# Patient Record
Sex: MALE | Race: BLACK OR AFRICAN AMERICAN | NOT HISPANIC OR LATINO | Employment: OTHER | URBAN - METROPOLITAN AREA
[De-identification: names, ages, dates, MRNs, and addresses within clinical notes are randomized per-mention and may not be internally consistent; named-entity substitution may affect disease eponyms.]

---

## 2017-08-09 ENCOUNTER — HOSPITAL ENCOUNTER (EMERGENCY)
Facility: OTHER | Age: 57
Discharge: HOME | End: 2017-08-09
Attending: EMERGENCY MEDICINE
Payer: MEDICARE

## 2017-08-09 VITALS
HEIGHT: 69 IN | SYSTOLIC BLOOD PRESSURE: 178 MMHG | OXYGEN SATURATION: 98 % | RESPIRATION RATE: 18 BRPM | BODY MASS INDEX: 26.66 KG/M2 | DIASTOLIC BLOOD PRESSURE: 117 MMHG | TEMPERATURE: 99 F | WEIGHT: 180 LBS | HEART RATE: 62 BPM

## 2017-08-09 DIAGNOSIS — G89.29 CHRONIC RIGHT-SIDED LOW BACK PAIN WITHOUT SCIATICA: Primary | ICD-10-CM

## 2017-08-09 DIAGNOSIS — M54.50 CHRONIC RIGHT-SIDED LOW BACK PAIN WITHOUT SCIATICA: Primary | ICD-10-CM

## 2017-08-09 PROCEDURE — 96372 THER/PROPH/DIAG INJ SC/IM: CPT

## 2017-08-09 PROCEDURE — 63600175 PHARM REV CODE 636 W HCPCS: Performed by: PHYSICIAN ASSISTANT

## 2017-08-09 PROCEDURE — 99283 EMERGENCY DEPT VISIT LOW MDM: CPT | Mod: 25

## 2017-08-09 RX ORDER — HYDROCODONE BITARTRATE AND ACETAMINOPHEN 10; 325 MG/1; MG/1
TABLET ORAL
Status: ON HOLD | COMMUNITY
End: 2018-08-13

## 2017-08-09 RX ORDER — KETOROLAC TROMETHAMINE 30 MG/ML
15 INJECTION, SOLUTION INTRAMUSCULAR; INTRAVENOUS
Status: COMPLETED | OUTPATIENT
Start: 2017-08-09 | End: 2017-08-09

## 2017-08-09 RX ADMIN — KETOROLAC TROMETHAMINE 15 MG: 30 INJECTION, SOLUTION INTRAMUSCULAR at 06:08

## 2017-08-09 NOTE — ED PROVIDER NOTES
"Encounter Date: 8/9/2017       History     Chief Complaint   Patient presents with    Back Pain     Pt reports low back pain x 12 years. Pt is out of Percocet. "I'm having Percocet withdrawals. I'll do anything to get out the pain." Ambulatory. Steady gait.      Patient is 56 year old male who presents with complaints of chronic lower back pain with exacerbation of pain because of poor pain management regimen.  He admits that his pain is usually managed at pain management clinic but he admits that this pain management clinic has recently closed down.  They gave him 3 options to follow-up with which he admits that he has.  He has appointments in the coming months and admits that he was seen today but was told by this particular clinic that they're also closing.  He is concerned that he has no follow-up plan and is amenable to "getting off the narcotics "but does admit that he needs someone to help facilitate this for him.  He has no report of nausea, vomiting, diarrhea, chest pain or shortness of breath.  He denies anxiety or agitation.  He does admit to taking half for Percocet today and admits that over the last month he has been stretching out remaining pills from his last prescription.  He denies fever, chills, nausea, vomiting.  He is currently unaccompanied in the ER.          Review of patient's allergies indicates:  No Known Allergies  Past Medical History:   Diagnosis Date    Chronic back pain     Diabetes mellitus     Gallstones     Glaucoma (increased eye pressure)     Hepatitis C     Hypertension      Past Surgical History:   Procedure Laterality Date    CHOLECYSTECTOMY      COLONOSCOPY N/A 4/27/2016    Procedure: COLONOSCOPY;  Surgeon: Andre Sanchez MD;  Location: [facility];  Service: Endoscopy;  Laterality: N/A;     History reviewed. No pertinent family history.  Social History   Substance Use Topics    Smoking status: Current Every Day Smoker     Packs/day: 1.00     Types: Cigarettes    " Smokeless tobacco: Never Used    Alcohol use No     Review of Systems   Constitutional: Negative for fever.   HENT: Negative for sore throat.    Respiratory: Negative for shortness of breath.    Cardiovascular: Negative for chest pain.   Gastrointestinal: Negative for abdominal pain and nausea.   Genitourinary: Negative for dysuria.   Musculoskeletal: Positive for back pain.   Skin: Negative for rash.   Neurological: Negative for weakness.   Hematological: Does not bruise/bleed easily.       Physical Exam     Initial Vitals [08/09/17 1655]   BP Pulse Resp Temp SpO2   (!) 172/114 71 16 98.3 °F (36.8 °C) 97 %      MAP       133.33         Physical Exam    Nursing note and vitals reviewed.  Constitutional: He appears well-developed and well-nourished. He is not diaphoretic. No distress.   Healthy appearing -American male in no acute distress or apparent pain.  He makes good eye contact, speaks in clear full sentences and ambulates with mildly antalgic gait.  He sits in upright position and transitions to standing without difficulty.  He is pleasant and cooperative.   HENT:   Head: Normocephalic and atraumatic.   Eyes: Conjunctivae and EOM are normal. Pupils are equal, round, and reactive to light. Right eye exhibits no discharge. Left eye exhibits no discharge. No scleral icterus.   Neck: Normal range of motion.   Cardiovascular: Normal rate, regular rhythm and normal heart sounds. Exam reveals no gallop and no friction rub.    No murmur heard.  Pulmonary/Chest: Breath sounds normal. He has no wheezes. He has no rhonchi. He has no rales.   Abdominal: Soft. Bowel sounds are normal.   Musculoskeletal: Normal range of motion. He exhibits no edema or tenderness.   ight-sided lumbar paraspinal musculature tenderness to palpation with no overlying skin changes.  Pain does not radiate down right leg.  There is no midline bony tenderness crepitus or step-offs.   Lymphadenopathy:     He has no cervical adenopathy.    Neurological: He is alert and oriented to person, place, and time. He has normal strength. No cranial nerve deficit or sensory deficit.   Skin: Skin is warm. Capillary refill takes less than 2 seconds. No rash and no abscess noted. No erythema.   Psychiatric: He has a normal mood and affect. His behavior is normal. Thought content normal.         ED Course   Procedures  Labs Reviewed - No data to display          Medical Decision Making:   ED Management:  Urgent evaluation a 56-year-old male who presents with complaints of exacerbation of chronic lower back pain and surgery of appropriate follow-up plan for pain management.  He is afebrile, nontoxic appearing, hemodynamically stable though hypertensive at 178/117 thought to be related to pain.  He has been compliant with his blood pressure medications.  He has no concern for vertebral fracture, cord compression or cauda equina syndrome.  No suspicion for epidural abscess.  No imaging felt warranted at this time.  I do not feel appropriate continuing patient's course of narcotic analgesia.  I did review the [facility] which does reveal a regular prescription of Percocet with occasional alternations with Norco.  He does have the same prescriber.  I do not suspect that patient is abusing narcotics however I do feel that he is dependent.  There is no evidence of acute withdrawal today and I do feel the patient is safe for discharge with instruction to follow-up with [facility] pain management clinic.  He is given a shot of Toradol which does seem to take the edge off his pain.  I did review his kidney function prior to giving this medication which did not reveal any chronic kidney injury.  Patient is educated on follow-up plan and verbalizes understanding.  He is amenable to this plan.  Case discussed with attending who agrees with plan.  Other:   I have discussed this case with another health care provider.       <> Summary of the Discussion: Hugo                     ED Course     Clinical Impression:   The encounter diagnosis was Chronic right-sided low back pain without sciatica.                           Deandra Pittman PA-C  08/09/17 1941

## 2017-08-09 NOTE — ED NOTES
Two patient identifiers have been checked and are correct.      Appearance: Pt awake, alert & oriented to person, place & time. Pt in no acute distress at present time. Pt is clean and well groomed with clothes appropriately fastened.   Skin: Skin warm, dry & intact. Color consistent with ethnicity. Mucous membranes moist. No breakdown or brusing noted.   Musculoskeletal: Patient moving all extremities well, no obvious swelling or deformities noted.  Bilateral lower back pain.  Respiratory: Respirations spontaneous, even, and non-labored. Visible chest rise noted. Airway is open and patent. No accessory muscle use noted.   Neurologic: Sensation is intact. Speech is clear and appropriate. Eyes open spontaneously, behavior appropriate to situation, follows commands, facial expression symmetrical, bilateral hand grasp equal and even, purposeful motor response noted.  Cardiac: All peripheral pulses present. No Bilateral lower extremity edema. Cap refill is <3 seconds. Pt denies chest pain, SOB, increased WOB upon exertion, dizziness or blurred vision at this time.   Abdomen: Abdomen soft, non-tender to palpation. Pt denies abdominal pains, discomfort, N/V/D at this time.   : Pt reports no dysuria or hematuria.     Fall risk band applied to pt.

## 2017-08-11 ENCOUNTER — HOSPITAL ENCOUNTER (EMERGENCY)
Facility: OTHER | Age: 57
Discharge: HOME | End: 2017-08-11
Attending: EMERGENCY MEDICINE
Payer: MEDICARE

## 2017-08-11 ENCOUNTER — TELEPHONE (OUTPATIENT)
Dept: PAIN MEDICINE | Facility: CLINIC | Age: 57
End: 2017-08-11

## 2017-08-11 VITALS
HEIGHT: 72 IN | SYSTOLIC BLOOD PRESSURE: 149 MMHG | HEART RATE: 78 BPM | TEMPERATURE: 99 F | RESPIRATION RATE: 18 BRPM | DIASTOLIC BLOOD PRESSURE: 101 MMHG | OXYGEN SATURATION: 96 % | BODY MASS INDEX: 23.03 KG/M2 | WEIGHT: 170 LBS

## 2017-08-11 DIAGNOSIS — R11.2 NAUSEA VOMITING AND DIARRHEA: Primary | ICD-10-CM

## 2017-08-11 DIAGNOSIS — F11.93 OPIATE WITHDRAWAL: ICD-10-CM

## 2017-08-11 DIAGNOSIS — R19.7 NAUSEA VOMITING AND DIARRHEA: Primary | ICD-10-CM

## 2017-08-11 DIAGNOSIS — E86.0 DEHYDRATION: ICD-10-CM

## 2017-08-11 DIAGNOSIS — N28.9 ACUTE RENAL INSUFFICIENCY: ICD-10-CM

## 2017-08-11 LAB
ALBUMIN SERPL BCP-MCNC: 4.8 G/DL
ALP SERPL-CCNC: 93 U/L
ALT SERPL W/O P-5'-P-CCNC: 23 U/L
AMPHET+METHAMPHET UR QL: NEGATIVE
ANION GAP SERPL CALC-SCNC: 19 MMOL/L
AST SERPL-CCNC: 22 U/L
BARBITURATES UR QL SCN>200 NG/ML: NEGATIVE
BASOPHILS # BLD AUTO: 0.02 K/UL
BASOPHILS NFR BLD: 0.1 %
BENZODIAZ UR QL SCN>200 NG/ML: NEGATIVE
BILIRUB SERPL-MCNC: 0.7 MG/DL
BUN SERPL-MCNC: 26 MG/DL
BZE UR QL SCN: NEGATIVE
CALCIUM SERPL-MCNC: 11.6 MG/DL
CANNABINOIDS UR QL SCN: ABNORMAL
CHLORIDE SERPL-SCNC: 103 MMOL/L
CO2 SERPL-SCNC: 17 MMOL/L
CREAT SERPL-MCNC: 2 MG/DL
CREAT UR-MCNC: 390.9 MG/DL
DIFFERENTIAL METHOD: ABNORMAL
EOSINOPHIL # BLD AUTO: 0.1 K/UL
EOSINOPHIL NFR BLD: 0.2 %
ERYTHROCYTE [DISTWIDTH] IN BLOOD BY AUTOMATED COUNT: 13.3 %
EST. GFR  (AFRICAN AMERICAN): 41 ML/MIN/1.73 M^2
EST. GFR  (NON AFRICAN AMERICAN): 36 ML/MIN/1.73 M^2
GLUCOSE SERPL-MCNC: 237 MG/DL
HCT VFR BLD AUTO: 55.5 %
HGB BLD-MCNC: 19.8 G/DL
LYMPHOCYTES # BLD AUTO: 4.1 K/UL
LYMPHOCYTES NFR BLD: 19 %
MCH RBC QN AUTO: 29.7 PG
MCHC RBC AUTO-ENTMCNC: 35.7 G/DL
MCV RBC AUTO: 83 FL
METHADONE UR QL SCN>300 NG/ML: NEGATIVE
MONOCYTES # BLD AUTO: 1.2 K/UL
MONOCYTES NFR BLD: 5.7 %
NEUTROPHILS # BLD AUTO: 16 K/UL
NEUTROPHILS NFR BLD: 74.6 %
OPIATES UR QL SCN: ABNORMAL
PCP UR QL SCN>25 NG/ML: NEGATIVE
PLATELET # BLD AUTO: 162 K/UL
PMV BLD AUTO: 11.8 FL
POTASSIUM SERPL-SCNC: 3.3 MMOL/L
PROT SERPL-MCNC: 9.3 G/DL
RBC # BLD AUTO: 6.67 M/UL
SODIUM SERPL-SCNC: 139 MMOL/L
TOXICOLOGY INFORMATION: ABNORMAL
WBC # BLD AUTO: 21.42 K/UL

## 2017-08-11 PROCEDURE — 63600175 PHARM REV CODE 636 W HCPCS: Performed by: EMERGENCY MEDICINE

## 2017-08-11 PROCEDURE — 80053 COMPREHEN METABOLIC PANEL: CPT

## 2017-08-11 PROCEDURE — 99284 EMERGENCY DEPT VISIT MOD MDM: CPT | Mod: 25

## 2017-08-11 PROCEDURE — 96375 TX/PRO/DX INJ NEW DRUG ADDON: CPT

## 2017-08-11 PROCEDURE — 96374 THER/PROPH/DIAG INJ IV PUSH: CPT

## 2017-08-11 PROCEDURE — 96361 HYDRATE IV INFUSION ADD-ON: CPT

## 2017-08-11 PROCEDURE — 85025 COMPLETE CBC W/AUTO DIFF WBC: CPT

## 2017-08-11 PROCEDURE — S0028 INJECTION, FAMOTIDINE, 20 MG: HCPCS | Performed by: EMERGENCY MEDICINE

## 2017-08-11 PROCEDURE — 96372 THER/PROPH/DIAG INJ SC/IM: CPT

## 2017-08-11 PROCEDURE — 80307 DRUG TEST PRSMV CHEM ANLYZR: CPT

## 2017-08-11 PROCEDURE — 25000003 PHARM REV CODE 250: Performed by: EMERGENCY MEDICINE

## 2017-08-11 RX ORDER — DICYCLOMINE HYDROCHLORIDE 10 MG/ML
20 INJECTION INTRAMUSCULAR
Status: COMPLETED | OUTPATIENT
Start: 2017-08-11 | End: 2017-08-11

## 2017-08-11 RX ORDER — POTASSIUM CHLORIDE 20 MEQ/1
40 TABLET, EXTENDED RELEASE ORAL
Status: COMPLETED | OUTPATIENT
Start: 2017-08-11 | End: 2017-08-11

## 2017-08-11 RX ORDER — ONDANSETRON 2 MG/ML
4 INJECTION INTRAMUSCULAR; INTRAVENOUS
Status: COMPLETED | OUTPATIENT
Start: 2017-08-11 | End: 2017-08-11

## 2017-08-11 RX ORDER — FAMOTIDINE 10 MG/ML
20 INJECTION INTRAVENOUS
Status: COMPLETED | OUTPATIENT
Start: 2017-08-11 | End: 2017-08-11

## 2017-08-11 RX ORDER — ONDANSETRON 4 MG/1
4 TABLET, FILM COATED ORAL EVERY 6 HOURS PRN
Qty: 15 TABLET | Refills: 0 | Status: SHIPPED | OUTPATIENT
Start: 2017-08-11 | End: 2022-04-05

## 2017-08-11 RX ORDER — IBUPROFEN 800 MG/1
800 TABLET ORAL EVERY 6 HOURS PRN
Qty: 30 TABLET | Refills: 0 | Status: ON HOLD | OUTPATIENT
Start: 2017-08-11 | End: 2018-08-13 | Stop reason: CLARIF

## 2017-08-11 RX ADMIN — SODIUM CHLORIDE 1000 ML: 0.9 INJECTION, SOLUTION INTRAVENOUS at 01:08

## 2017-08-11 RX ADMIN — FAMOTIDINE 20 MG: 10 INJECTION INTRAVENOUS at 01:08

## 2017-08-11 RX ADMIN — DICYCLOMINE HYDROCHLORIDE 20 MG: 10 INJECTION INTRAMUSCULAR at 01:08

## 2017-08-11 RX ADMIN — SODIUM CHLORIDE 1000 ML: 0.9 INJECTION, SOLUTION INTRAVENOUS at 02:08

## 2017-08-11 RX ADMIN — POTASSIUM CHLORIDE 40 MEQ: 1500 TABLET, EXTENDED RELEASE ORAL at 02:08

## 2017-08-11 RX ADMIN — ONDANSETRON 4 MG: 2 INJECTION INTRAMUSCULAR; INTRAVENOUS at 01:08

## 2017-08-11 NOTE — ED NOTES
Pt resting comfortably in bed. Pt was given water and a blanket. Pt tolerated PO. Vitals signs stable. Pt denies any further needs at this moment. Will continue to monitor.

## 2017-08-11 NOTE — ED NOTES
Pt resting comfortably, pt remains free from anymore episodes of vomiting since medications. Wife at the bedside, VSS. Will continue to monitor.

## 2017-08-11 NOTE — ED PROVIDER NOTES
"Encounter Date: 8/11/2017    SCRIBE #1 NOTE: I, Seth Larsen, am scribing for, and in the presence of, Dr. Garza.       History     Chief Complaint   Patient presents with    opiate withdrawl     pt reports he has been taking norco and percocet for 5 years and they shut down his clinic and now he does not have a Dr. to write for his medications. Pt reports he is having vomit and diarrhea on his self.      1:06 AM    Patient is a 56 year old male who presents to the ED with complaint of opiate withdrawal. Symptoms began 24 hours ago. He complains of abdominal pain, vomiting, nausea, and diarrhea. His spouse reports he was found lying on the bathroom floor, "to weak to get up." He sees Dr. Torres, and has a prescription for percocet and norco. He reports he ran out of norco one month ago, but had some percocet "left over." He last took an opioid yesterday. He reports he is "trying to get off." He has a history of HTN and DM. He reports his sugar was 160 today. He admits to use of tobacco (one pack a day), but denies use of alcohol.      The history is provided by the patient and the spouse.     Review of patient's allergies indicates:  No Known Allergies  Past Medical History:   Diagnosis Date    Chronic back pain     Diabetes mellitus     Gallstones     Glaucoma (increased eye pressure)     Hepatitis C     Hypertension      Past Surgical History:   Procedure Laterality Date    CHOLECYSTECTOMY      COLONOSCOPY N/A 4/27/2016    Procedure: COLONOSCOPY;  Surgeon: Andre Sanchez MD;  Location: [facility];  Service: Endoscopy;  Laterality: N/A;     History reviewed. No pertinent family history.  Social History   Substance Use Topics    Smoking status: Current Every Day Smoker     Packs/day: 1.00     Types: Cigarettes    Smokeless tobacco: Never Used    Alcohol use No     Review of Systems   Constitutional: Negative for fever.   HENT: Negative for sore throat.    Respiratory: Negative for cough and " shortness of breath.    Cardiovascular: Negative for chest pain.   Gastrointestinal: Positive for abdominal pain, diarrhea, nausea and vomiting.   Genitourinary: Negative for dysuria.   Musculoskeletal: Positive for back pain.   Skin: Negative for rash.   Neurological: Positive for weakness.   Psychiatric/Behavioral: Negative for behavioral problems.       Physical Exam     Initial Vitals [08/11/17 0059]   BP Pulse Resp Temp SpO2   (!) 146/88 96 18 99.1 °F (37.3 °C) 98 %      MAP       107.33         Physical Exam    Nursing note and vitals reviewed.  Constitutional: He appears well-developed and well-nourished. He is diaphoretic.   He is overweight. He is uncomfortable appearing.   HENT:   Head: Normocephalic and atraumatic.   Right Ear: External ear normal.   Left Ear: External ear normal.   Nose: Nose normal.   Dry mucous membranes.   Eyes: Conjunctivae and EOM are normal. Pupils are equal, round, and reactive to light.   Neck: Normal range of motion. Neck supple.   Cardiovascular: Regular rhythm, normal heart sounds and intact distal pulses. Exam reveals no gallop and no friction rub.    No murmur heard.  Tachycardic.   Pulmonary/Chest: Breath sounds normal. No respiratory distress. He has no wheezes. He has no rhonchi. He has no rales.   Abdominal: Soft. He exhibits no distension. There is no tenderness.   Musculoskeletal: Normal range of motion. He exhibits no edema or tenderness.   Neurological: He is alert and oriented to person, place, and time. He has normal strength.   Skin: Skin is warm. No rash and no abscess noted. No erythema. No pallor.   Psychiatric: He has a normal mood and affect. His behavior is normal. Judgment and thought content normal.         ED Course   Critical Care  Date/Time: 8/11/2017 6:56 AM  Performed by: KARSTEN BOJORQUEZ II  Authorized by: KARSTEN BOJORQUEZ II   Direct patient critical care time: 15 minutes  Additional history critical care time: 4 minutes  Ordering / reviewing  critical care time: 10 minutes  Documentation critical care time: 10 minutes  Total critical care time (exclusive of procedural time) : 39 minutes  Critical care was necessary to treat or prevent imminent or life-threatening deterioration of the following conditions: dehydration, renal failure and metabolic crisis.        Labs Reviewed   CBC W/ AUTO DIFFERENTIAL - Abnormal; Notable for the following:        Result Value    WBC 21.42 (*)     RBC 6.67 (*)     Hemoglobin 19.8 (*)     Hematocrit 55.5 (*)     Gran # 16.0 (*)     Mono # 1.2 (*)     Gran% 74.6 (*)     All other components within normal limits   COMPREHENSIVE METABOLIC PANEL - Abnormal; Notable for the following:     Potassium 3.3 (*)     CO2 17 (*)     Glucose 237 (*)     BUN, Bld 26 (*)     Creatinine 2.0 (*)     Calcium 11.6 (*)     Total Protein 9.3 (*)     Anion Gap 19 (*)     eGFR if  41 (*)     eGFR if non  36 (*)     All other components within normal limits   DRUG SCREEN PANEL, URINE EMERGENCY - Abnormal; Notable for the following:     Creatinine, Random Ur 390.9 (*)     All other components within normal limits             Medical Decision Making:   Clinical Tests:   Lab Tests: Ordered and Reviewed  ED Management:  2:42 AM - Patient appears much more comfortable. He is resting comfortably in the stretcher. No further emesis.             Scribe Attestation:   Scribe #1: I performed the above scribed service and the documentation accurately describes the services I performed. I attest to the accuracy of the note.    Attending Attestation:           Physician Attestation for Scribe:  Physician Attestation Statement for Scribe #1: I, Dr. Garza, reviewed documentation, as scribed by Seth Larsen in my presence, and it is both accurate and complete.                 ED Course     Pt presents w/ nv/d after running out of chronic narcotics.  Awaiting new appt w/ pain mgmt.  Dehydrated on exam, initially tachy and  diaphoretic.  After IVFs and antiemetics pt feels/appears much better.  No further emesis.  HR normalized. . Labs - mild hypoK - orlaly repleted.  Acute rise in Cr- given 2L IVF + PO fluids.  sympt tx for opiate w/d.  Encouraged f/u w/ pcp and/or pain mgmt.    Clinical Impression:     1. Nausea vomiting and diarrhea    2. Opiate withdrawal    3. Dehydration    4. Acute renal insufficiency                                 Richard Garza II, MD  08/11/17 0658       Richard Garza II, MD  08/11/17 0659

## 2017-08-11 NOTE — ED NOTES
Pt presents to the ed with c/o abdominal pain, nausea and vomiting since yesterday. Pt states he has been taking pain medication regularly for the past 5 years and has been trying to get off of them by taking only one to two /day for the past month. Pt denies not being around anyone sick the past few days. Pt appears to have generalized  weakness. Pt is placed on cardiac monitoring, BP, and pulse ox. Pt is visible from the nurses station. Will continue to monitor.

## 2017-08-11 NOTE — ED NOTES
Pt remained stable with no episodes of vomiting since medications. Pt has been PO. Pt has been in a more calm state. Discussed medications and follow up plan with pt and spouse, both verbalized understanding. PT ambulated with steady gait to discharge. Pt discharged in stable condition.

## 2017-08-11 NOTE — TELEPHONE ENCOUNTER
----- Message from Maximiliano Willson sent at 8/10/2017  9:27 AM CDT -----  Contact: Emery Barragan  _X  1st Request  _  2nd Request  _  3rd Request        Who: Emery Barragan    Why: Patient calling to schedule an appt with Dr. Espinal. Patient states he feels the need to be seen today. Patient stated that he would like get off of pain medication and is having withdrawls. Please call back to schedule.    What Number to Call Back: [phone]    When to Expect a call back: (With in 24 hours)

## 2017-08-15 ENCOUNTER — TELEPHONE (OUTPATIENT)
Dept: PAIN MEDICINE | Facility: CLINIC | Age: 57
End: 2017-08-15

## 2017-08-15 NOTE — TELEPHONE ENCOUNTER
----- Message from Kevin Mortensen MA sent at 8/11/2017  4:54 PM CDT -----  Message   Received: Yesterday      Appointment Access    Same Day Access Requested   Message Contents   Maximiliano Rodriguez Staff  Caller: Emery Barragan (Yesterday,  9:27 AM)         _X  1st Request   _  2nd Request   _  3rd Request         Who: Emery Barragan     Why: Patient calling to schedule an appt with Dr. Espinal. Patient states he feels the need to be seen today. Patient stated that he would like get off of pain medication and is having withdrawls. Please call back to schedule.     What Number to Call Back: [phone]     When to Expect a call back: (With in 24 hours)

## 2017-08-22 ENCOUNTER — OFFICE VISIT (OUTPATIENT)
Dept: PAIN MEDICINE | Facility: CLINIC | Age: 57
End: 2017-08-22
Attending: ANESTHESIOLOGY
Payer: MEDICARE

## 2017-08-22 ENCOUNTER — TELEPHONE (OUTPATIENT)
Dept: PAIN MEDICINE | Facility: CLINIC | Age: 57
End: 2017-08-22

## 2017-08-22 VITALS
HEART RATE: 62 BPM | BODY MASS INDEX: 26.8 KG/M2 | SYSTOLIC BLOOD PRESSURE: 140 MMHG | WEIGHT: 202.19 LBS | TEMPERATURE: 98 F | DIASTOLIC BLOOD PRESSURE: 87 MMHG | HEIGHT: 73 IN

## 2017-08-22 DIAGNOSIS — M47.816 SPONDYLOSIS OF LUMBAR REGION WITHOUT MYELOPATHY OR RADICULOPATHY: ICD-10-CM

## 2017-08-22 DIAGNOSIS — F11.20 UNCOMPLICATED OPIOID DEPENDENCE: Primary | ICD-10-CM

## 2017-08-22 PROCEDURE — 99999 PR PBB SHADOW E&M-EST. PATIENT-LVL III: CPT | Mod: PBBFAC,,, | Performed by: ANESTHESIOLOGY

## 2017-08-22 PROCEDURE — 99499 UNLISTED E&M SERVICE: CPT | Mod: S$PBB,,, | Performed by: ANESTHESIOLOGY

## 2017-08-22 PROCEDURE — 99213 OFFICE O/P EST LOW 20 MIN: CPT | Mod: PBBFAC | Performed by: ANESTHESIOLOGY

## 2017-08-22 RX ORDER — BLOOD SUGAR DIAGNOSTIC
STRIP MISCELLANEOUS
Refills: 3 | COMMUNITY
Start: 2017-07-31 | End: 2022-04-05

## 2017-08-22 RX ORDER — OXYCODONE AND ACETAMINOPHEN 7.5; 325 MG/1; MG/1
TABLET ORAL
Refills: 0 | Status: ON HOLD | COMMUNITY
Start: 2017-06-13 | End: 2018-08-13

## 2017-08-22 RX ORDER — INSULIN LISPRO 100 [IU]/ML
INJECTION, SOLUTION INTRAVENOUS; SUBCUTANEOUS
Refills: 3 | Status: ON HOLD | COMMUNITY
Start: 2017-07-31 | End: 2018-08-13

## 2017-08-22 RX ORDER — PEN NEEDLE, DIABETIC 29 G X1/2"
NEEDLE, DISPOSABLE MISCELLANEOUS
Refills: 3 | COMMUNITY
Start: 2017-08-01 | End: 2022-04-05

## 2017-08-22 RX ORDER — PROMETHAZINE HYDROCHLORIDE 25 MG/1
TABLET ORAL
Status: ON HOLD | COMMUNITY
Start: 2017-07-12 | End: 2018-08-13

## 2017-08-22 RX ORDER — HYDROCHLOROTHIAZIDE 12.5 MG/1
12.5 TABLET ORAL DAILY
Refills: 1 | Status: ON HOLD | COMMUNITY
Start: 2017-07-31 | End: 2018-08-13 | Stop reason: CLARIF

## 2017-08-22 RX ORDER — CLOBETASOL PROPIONATE 0.5 MG/G
CREAM TOPICAL
Status: ON HOLD | COMMUNITY
Start: 2017-07-12 | End: 2018-08-13

## 2017-08-22 RX ORDER — DICLOFENAC SODIUM 16.05 MG/ML
SOLUTION TOPICAL
Status: ON HOLD | COMMUNITY
Start: 2017-07-12 | End: 2018-08-13

## 2017-08-22 RX ORDER — CLONIDINE HYDROCHLORIDE 0.1 MG/1
TABLET ORAL
Status: ON HOLD | COMMUNITY
Start: 2017-07-12 | End: 2018-08-13

## 2017-08-22 NOTE — TELEPHONE ENCOUNTER
----- Message from Jennifer Espinal MD sent at 8/17/2017  2:28 PM CDT -----  ok  ----- Message -----  From: Kevin Mortensen MA  Sent: 8/15/2017   3:59 PM  To: Jennifer Espinal MD    Called and spoke with pt about scheduling appt with you. Pt states he has never seen psychiatry before . He states his doctor was suspended from practice Dr. Torres. He wants to get off medicine Percocet and Norco this is what he was put on for pain by Dr. Torres. Pt currently taking  BC powder now for pain. Please advise.  ----- Message -----  From: Kevin Mortensen MA  Sent: 8/11/2017   4:54 PM  To: Kevin Mortensen MA    Message   Received: Yesterday      Appointment Access    Same Day Access Requested   Message Contents   Maximiliano Rodriguez Staff  Caller: Emery Braragan (Yesterday,  9:27 AM)         _X  1st Request   _  2nd Request   _  3rd Request         Who: Emery Barragan     Why: Patient calling to schedule an appt with Dr. Espinal. Patient states he feels the need to be seen today. Patient stated that he would like get off of pain medication and is having withdrawls. Please call back to schedule.     What Number to Call Back: [phone]     When to Expect a call back: (With in 24 hours)

## 2017-08-22 NOTE — PROGRESS NOTES
Subjective:      Patient ID: Emery Barragan is a 56 y.o. male.    Chief Complaint: Low-back Pain    Referred by: Self, Aaareferral     Pain Scales  Best: 8/10  Worst: 9/10  Usually: 7/10  Today: 8/10     patient is referred here from the emergency room with opioid dependence.  Had withdrawal symptoms on 11 August.  He is still suffering with withdrawal symptoms.  Nausea, vomiting, cramps, diarrhea and irritability.  In the emergency room he received intravenous fluids and medications to control emesis.  The patient admitted to using street drugs to try to manage his withdrawal symptoms in the past few days.  This morning he took half a Lortab.  The patient was discussed with Dr. Espinal who says she will briefly see him today as her schedule is full and start him on a plan to manage his withdrawal symptoms.    Past Medical History:   Diagnosis Date    Chronic back pain     Diabetes mellitus     Gallstones     Glaucoma (increased eye pressure)     Hepatitis C     Hypertension        Past Surgical History:   Procedure Laterality Date    CHOLECYSTECTOMY      COLONOSCOPY N/A 4/27/2016    Procedure: COLONOSCOPY;  Surgeon: Andre Sanchez MD;  Location[facility];  Service: Endoscopy;  Laterality: N/A;       Review of patient's allergies indicates:  No Known Allergies    Current Outpatient Prescriptions   Medication Sig Dispense Refill    amlodipine (NORVASC) 10 MG tablet Take 10 mg by mouth once daily.      BD INSULIN SYRINGE ULTRA-FINE 0.5 mL 31 gauge x 5/16 Syrg INJECT 3 TIMES A DAY AS DIRECTED  3    clobetasol (TEMOVATE) 0.05 % cream       cloNIDine (CATAPRES) 0.1 MG tablet       diclofenac sodium 1.5 % Drop       enalapril (VASOTEC) 20 MG tablet Take 20 mg by mouth once daily.      HUMALOG 100 unit/mL injection INJECT 2 TO 5 UNITS WITH EACH MEAL AS DIRECTED.  3    hydrochlorothiazide (HYDRODIURIL) 12.5 MG Tab Take 12.5 mg by mouth once daily.  1    hydrochlorothiazide (MICROZIDE) 12.5 mg capsule  "Take 12.5 mg by mouth once daily.      hydrocodone-acetaminophen 10-325mg (NORCO)  mg Tab Take by mouth.      ibuprofen (ADVIL,MOTRIN) 800 MG tablet Take 1 tablet (800 mg total) by mouth every 6 (six) hours as needed for Pain. 30 tablet 0    insulin aspart (NOVOLOG) 100 unit/mL injection Inject 5 Units into the skin 3 (three) times daily before meals.      insulin glargine (LANTUS) 100 unit/mL injection Inject 30 Units into the skin every evening.      ondansetron (ZOFRAN) 4 MG tablet Take 1 tablet (4 mg total) by mouth every 6 (six) hours as needed for Nausea. 15 tablet 0    ONETOUCH ULTRA TEST Strp TEST TWICE DAILY.  3    oxycodone-acetaminophen (PERCOCET) 7.5-325 mg per tablet TAKE 1 TABLET BY MOUTH 3 TIME DAILY AS NEEDED  0    oxycodone-acetaminophen  mg (PERCOCET)  mg per tablet Take 1 tablet by mouth 3 (three) times daily as needed.      promethazine (PHENERGAN) 25 MG tablet        No current facility-administered medications for this visit.        History reviewed. No pertinent family history.    Social History     Social History    Marital status:      Spouse name: N/A    Number of children: N/A    Years of education: N/A     Occupational History    Not on file.     Social History Main Topics    Smoking status: Current Every Day Smoker     Packs/day: 1.00     Types: Cigarettes    Smokeless tobacco: Never Used    Alcohol use No    Drug use: No    Sexual activity: Not on file     Other Topics Concern    Not on file     Social History Narrative    No narrative on file            Review of Systems   Musculoskeletal: Positive for back pain.           Objective:      BP (!) 140/87   Pulse 62   Temp 97.9 °F (36.6 °C) (Oral)   Ht 6' 1" (1.854 m)   Wt 91.7 kg (202 lb 2.6 oz)   BMI 26.67 kg/m²   Normocephalic.  Atraumatic.  Affect appropriate.  Breathing unlabored.  Extra ocular muscles intact.      Ortho/SPM Exam      Assessment:       Encounter Diagnoses   Name " Primary?    Uncomplicated opioid dependence Yes    Spondylosis of lumbar region without myelopathy or radiculopathy          Plan:       Emery was seen today for low-back pain.    Diagnoses and all orders for this visit:    Uncomplicated opioid dependence    Spondylosis of lumbar region without myelopathy or radiculopathy         We discussed with the patient the assessment and recommendations. The following is the plan we agreed on:  1.  Dr. Espinal will briefly see the patient today to manage his opioid dependence.

## 2017-08-22 NOTE — TELEPHONE ENCOUNTER
----- Message from Jennifer Espinal MD sent at 8/17/2017  2:28 PM CDT -----  ok  ----- Message -----  From: Kevin Mortensen MA  Sent: 8/15/2017   3:59 PM  To: Jennifer Espinal MD    Called and spoke with pt about scheduling appt with you. Pt states he has never seen psychiatry before . He states his doctor was suspended from practice Dr. Torres. He wants to get off medicine Percocet and Norco this is what he was put on for pain by Dr. Torres. Pt currently taking  BC powder now for pain. Please advise.  ----- Message -----  From: Kevin Mortensen MA  Sent: 8/11/2017   4:54 PM  To: Kevin Mortensen MA    Message   Received: Yesterday      Appointment Access    Same Day Access Requested   Message Contents   Maximiliano Rodriguez Staff  Caller: Emery Barragan (Yesterday,  9:27 AM)         _X  1st Request   _  2nd Request   _  3rd Request         Who: Emery Barragan     Why: Patient calling to schedule an appt with Dr. Espinal. Patient states he feels the need to be seen today. Patient stated that he would like get off of pain medication and is having withdrawls. Please call back to schedule.     What Number to Call Back: [phone]     When to Expect a call back: (With in 24 hours)

## 2018-08-11 ENCOUNTER — HOSPITAL ENCOUNTER (INPATIENT)
Facility: OTHER | Age: 58
LOS: 3 days | Discharge: HOME | DRG: 194 | End: 2018-08-14
Attending: EMERGENCY MEDICINE | Admitting: INTERNAL MEDICINE
Payer: MEDICARE

## 2018-08-11 DIAGNOSIS — I10 ESSENTIAL HYPERTENSION: ICD-10-CM

## 2018-08-11 DIAGNOSIS — Z79.4 TYPE 2 DIABETES MELLITUS WITHOUT COMPLICATION, WITH LONG-TERM CURRENT USE OF INSULIN: ICD-10-CM

## 2018-08-11 DIAGNOSIS — J18.9 PNEUMONIA OF BOTH LOWER LOBES DUE TO INFECTIOUS ORGANISM: Primary | ICD-10-CM

## 2018-08-11 DIAGNOSIS — R07.9 CHEST PAIN: ICD-10-CM

## 2018-08-11 DIAGNOSIS — R11.10 VOMITING: ICD-10-CM

## 2018-08-11 DIAGNOSIS — R05.9 COUGH: ICD-10-CM

## 2018-08-11 DIAGNOSIS — E11.9 TYPE 2 DIABETES MELLITUS WITHOUT COMPLICATION, WITH LONG-TERM CURRENT USE OF INSULIN: ICD-10-CM

## 2018-08-11 DIAGNOSIS — Z72.0 TOBACCO ABUSE: ICD-10-CM

## 2018-08-11 LAB
ALBUMIN SERPL BCP-MCNC: 3.3 G/DL
ALP SERPL-CCNC: 95 U/L
ALT SERPL W/O P-5'-P-CCNC: 19 U/L
ANION GAP SERPL CALC-SCNC: 12 MMOL/L
ANION GAP SERPL CALC-SCNC: 21 MMOL/L
AST SERPL-CCNC: 31 U/L
B-OH-BUTYR BLD STRIP-SCNC: 0.8 MMOL/L
BACTERIA #/AREA URNS HPF: ABNORMAL /HPF
BASOPHILS # BLD AUTO: 0.01 K/UL
BASOPHILS NFR BLD: 0.1 %
BILIRUB SERPL-MCNC: 0.5 MG/DL
BILIRUB UR QL STRIP: ABNORMAL
BUN SERPL-MCNC: 21 MG/DL
BUN SERPL-MCNC: 21 MG/DL
CALCIUM SERPL-MCNC: 8.3 MG/DL
CALCIUM SERPL-MCNC: 9.5 MG/DL
CHLORIDE SERPL-SCNC: 104 MMOL/L
CHLORIDE SERPL-SCNC: 98 MMOL/L
CLARITY UR: CLEAR
CO2 SERPL-SCNC: 22 MMOL/L
CO2 SERPL-SCNC: 23 MMOL/L
COLOR UR: YELLOW
CREAT SERPL-MCNC: 1.2 MG/DL
CREAT SERPL-MCNC: 1.5 MG/DL
DIFFERENTIAL METHOD: ABNORMAL
EOSINOPHIL # BLD AUTO: 0 K/UL
EOSINOPHIL NFR BLD: 0 %
ERYTHROCYTE [DISTWIDTH] IN BLOOD BY AUTOMATED COUNT: 13.2 %
EST. GFR  (AFRICAN AMERICAN): 58 ML/MIN/1.73 M^2
EST. GFR  (AFRICAN AMERICAN): >60 ML/MIN/1.73 M^2
EST. GFR  (NON AFRICAN AMERICAN): 50 ML/MIN/1.73 M^2
EST. GFR  (NON AFRICAN AMERICAN): >60 ML/MIN/1.73 M^2
ESTIMATED AVG GLUCOSE: 146 MG/DL
GLUCOSE SERPL-MCNC: 245 MG/DL
GLUCOSE SERPL-MCNC: 257 MG/DL
GLUCOSE UR QL STRIP: NEGATIVE
GRAN CASTS #/AREA URNS LPF: 1 /LPF
HBA1C MFR BLD HPLC: 6.7 %
HCT VFR BLD AUTO: 48.4 %
HGB BLD-MCNC: 16.6 G/DL
HGB UR QL STRIP: ABNORMAL
HYALINE CASTS #/AREA URNS LPF: 7 /LPF
KETONES UR QL STRIP: ABNORMAL
LACTATE SERPL-SCNC: 2.3 MMOL/L
LEUKOCYTE ESTERASE UR QL STRIP: NEGATIVE
LIPASE SERPL-CCNC: 12 U/L
LYMPHOCYTES # BLD AUTO: 0.9 K/UL
LYMPHOCYTES NFR BLD: 5.1 %
MAGNESIUM SERPL-MCNC: 2.6 MG/DL
MCH RBC QN AUTO: 29 PG
MCHC RBC AUTO-ENTMCNC: 34.3 G/DL
MCV RBC AUTO: 85 FL
MICROSCOPIC COMMENT: ABNORMAL
MONOCYTES # BLD AUTO: 1.1 K/UL
MONOCYTES NFR BLD: 5.9 %
NEUTROPHILS # BLD AUTO: 16.2 K/UL
NEUTROPHILS NFR BLD: 88.6 %
NITRITE UR QL STRIP: NEGATIVE
PH UR STRIP: 6 [PH] (ref 5–8)
PLATELET # BLD AUTO: 142 K/UL
PMV BLD AUTO: 11.8 FL
POCT GLUCOSE: 219 MG/DL (ref 70–110)
POCT GLUCOSE: 263 MG/DL (ref 70–110)
POCT GLUCOSE: 270 MG/DL (ref 70–110)
POTASSIUM SERPL-SCNC: 3.7 MMOL/L
POTASSIUM SERPL-SCNC: 4.5 MMOL/L
PROT SERPL-MCNC: 8.3 G/DL
PROT UR QL STRIP: ABNORMAL
RBC # BLD AUTO: 5.73 M/UL
RBC #/AREA URNS HPF: 5 /HPF (ref 0–4)
SODIUM SERPL-SCNC: 139 MMOL/L
SODIUM SERPL-SCNC: 141 MMOL/L
SP GR UR STRIP: >=1.03 (ref 1–1.03)
URN SPEC COLLECT METH UR: ABNORMAL
UROBILINOGEN UR STRIP-ACNC: NEGATIVE EU/DL
WBC # BLD AUTO: 18.27 K/UL
WBC #/AREA URNS HPF: 1 /HPF (ref 0–5)

## 2018-08-11 PROCEDURE — 11000001 HC ACUTE MED/SURG PRIVATE ROOM

## 2018-08-11 PROCEDURE — 96372 THER/PROPH/DIAG INJ SC/IM: CPT

## 2018-08-11 PROCEDURE — 63600175 PHARM REV CODE 636 W HCPCS: Performed by: INTERNAL MEDICINE

## 2018-08-11 PROCEDURE — 27000221 HC OXYGEN, UP TO 24 HOURS

## 2018-08-11 PROCEDURE — 83690 ASSAY OF LIPASE: CPT

## 2018-08-11 PROCEDURE — 99285 EMERGENCY DEPT VISIT HI MDM: CPT

## 2018-08-11 PROCEDURE — 63600175 PHARM REV CODE 636 W HCPCS: Performed by: EMERGENCY MEDICINE

## 2018-08-11 PROCEDURE — 25000003 PHARM REV CODE 250: Performed by: EMERGENCY MEDICINE

## 2018-08-11 PROCEDURE — 82010 KETONE BODYS QUAN: CPT

## 2018-08-11 PROCEDURE — 83036 HEMOGLOBIN GLYCOSYLATED A1C: CPT

## 2018-08-11 PROCEDURE — 80048 BASIC METABOLIC PNL TOTAL CA: CPT

## 2018-08-11 PROCEDURE — 96365 THER/PROPH/DIAG IV INF INIT: CPT

## 2018-08-11 PROCEDURE — 83735 ASSAY OF MAGNESIUM: CPT

## 2018-08-11 PROCEDURE — 36415 COLL VENOUS BLD VENIPUNCTURE: CPT

## 2018-08-11 PROCEDURE — 96361 HYDRATE IV INFUSION ADD-ON: CPT

## 2018-08-11 PROCEDURE — 94761 N-INVAS EAR/PLS OXIMETRY MLT: CPT

## 2018-08-11 PROCEDURE — 81000 URINALYSIS NONAUTO W/SCOPE: CPT

## 2018-08-11 PROCEDURE — 80053 COMPREHEN METABOLIC PANEL: CPT

## 2018-08-11 PROCEDURE — 25000003 PHARM REV CODE 250: Performed by: INTERNAL MEDICINE

## 2018-08-11 PROCEDURE — 25000003 PHARM REV CODE 250: Performed by: PHYSICIAN ASSISTANT

## 2018-08-11 PROCEDURE — 85025 COMPLETE CBC W/AUTO DIFF WBC: CPT

## 2018-08-11 PROCEDURE — 82962 GLUCOSE BLOOD TEST: CPT

## 2018-08-11 PROCEDURE — 83605 ASSAY OF LACTIC ACID: CPT

## 2018-08-11 PROCEDURE — 87040 BLOOD CULTURE FOR BACTERIA: CPT

## 2018-08-11 PROCEDURE — 96375 TX/PRO/DX INJ NEW DRUG ADDON: CPT

## 2018-08-11 RX ORDER — GLUCAGON 1 MG
1 KIT INJECTION
Status: DISCONTINUED | OUTPATIENT
Start: 2018-08-11 | End: 2018-08-14 | Stop reason: HOSPADM

## 2018-08-11 RX ORDER — AZITHROMYCIN 250 MG/1
500 TABLET, FILM COATED ORAL EVERY 24 HOURS
Status: DISCONTINUED | OUTPATIENT
Start: 2018-08-12 | End: 2018-08-14 | Stop reason: HOSPADM

## 2018-08-11 RX ORDER — FAMOTIDINE 20 MG/1
20 TABLET, FILM COATED ORAL
Status: COMPLETED | OUTPATIENT
Start: 2018-08-11 | End: 2018-08-11

## 2018-08-11 RX ORDER — SODIUM CHLORIDE 0.9 % (FLUSH) 0.9 %
5 SYRINGE (ML) INJECTION
Status: DISCONTINUED | OUTPATIENT
Start: 2018-08-11 | End: 2018-08-14 | Stop reason: HOSPADM

## 2018-08-11 RX ORDER — ONDANSETRON 2 MG/ML
4 INJECTION INTRAMUSCULAR; INTRAVENOUS
Status: COMPLETED | OUTPATIENT
Start: 2018-08-11 | End: 2018-08-11

## 2018-08-11 RX ORDER — INSULIN ASPART 100 [IU]/ML
1-10 INJECTION, SOLUTION INTRAVENOUS; SUBCUTANEOUS
Status: DISCONTINUED | OUTPATIENT
Start: 2018-08-11 | End: 2018-08-14 | Stop reason: HOSPADM

## 2018-08-11 RX ORDER — ACETAMINOPHEN 325 MG/1
650 TABLET ORAL
Status: COMPLETED | OUTPATIENT
Start: 2018-08-11 | End: 2018-08-11

## 2018-08-11 RX ORDER — ONDANSETRON 2 MG/ML
4 INJECTION INTRAMUSCULAR; INTRAVENOUS EVERY 8 HOURS PRN
Status: DISCONTINUED | OUTPATIENT
Start: 2018-08-11 | End: 2018-08-14 | Stop reason: HOSPADM

## 2018-08-11 RX ORDER — KETOROLAC TROMETHAMINE 30 MG/ML
15 INJECTION, SOLUTION INTRAMUSCULAR; INTRAVENOUS
Status: COMPLETED | OUTPATIENT
Start: 2018-08-11 | End: 2018-08-11

## 2018-08-11 RX ORDER — DICYCLOMINE HYDROCHLORIDE 10 MG/ML
20 INJECTION INTRAMUSCULAR
Status: COMPLETED | OUTPATIENT
Start: 2018-08-11 | End: 2018-08-12

## 2018-08-11 RX ORDER — METOCLOPRAMIDE HYDROCHLORIDE 5 MG/ML
10 INJECTION INTRAMUSCULAR; INTRAVENOUS ONCE
Status: DISCONTINUED | OUTPATIENT
Start: 2018-08-12 | End: 2018-08-11

## 2018-08-11 RX ORDER — MORPHINE SULFATE 4 MG/ML
4 INJECTION, SOLUTION INTRAMUSCULAR; INTRAVENOUS
Status: COMPLETED | OUTPATIENT
Start: 2018-08-11 | End: 2018-08-11

## 2018-08-11 RX ORDER — MORPHINE SULFATE 2 MG/ML
2 INJECTION, SOLUTION INTRAMUSCULAR; INTRAVENOUS EVERY 8 HOURS PRN
Status: DISCONTINUED | OUTPATIENT
Start: 2018-08-11 | End: 2018-08-11

## 2018-08-11 RX ORDER — ENALAPRIL MALEATE 20 MG/1
20 TABLET ORAL DAILY
Status: DISCONTINUED | OUTPATIENT
Start: 2018-08-12 | End: 2018-08-14 | Stop reason: HOSPADM

## 2018-08-11 RX ORDER — CYCLOBENZAPRINE HCL 5 MG
5 TABLET ORAL 3 TIMES DAILY PRN
Status: DISCONTINUED | OUTPATIENT
Start: 2018-08-11 | End: 2018-08-11

## 2018-08-11 RX ORDER — ACETAMINOPHEN 650 MG/20.3ML
650 LIQUID ORAL EVERY 6 HOURS PRN
Status: DISCONTINUED | OUTPATIENT
Start: 2018-08-11 | End: 2018-08-14 | Stop reason: HOSPADM

## 2018-08-11 RX ORDER — HYDROCHLOROTHIAZIDE 12.5 MG/1
12.5 TABLET ORAL DAILY
Status: DISCONTINUED | OUTPATIENT
Start: 2018-08-12 | End: 2018-08-14 | Stop reason: HOSPADM

## 2018-08-11 RX ORDER — SODIUM CHLORIDE 9 MG/ML
INJECTION, SOLUTION INTRAVENOUS CONTINUOUS
Status: DISCONTINUED | OUTPATIENT
Start: 2018-08-12 | End: 2018-08-12

## 2018-08-11 RX ORDER — IBUPROFEN 200 MG
24 TABLET ORAL
Status: DISCONTINUED | OUTPATIENT
Start: 2018-08-11 | End: 2018-08-14 | Stop reason: HOSPADM

## 2018-08-11 RX ORDER — IBUPROFEN 200 MG
16 TABLET ORAL
Status: DISCONTINUED | OUTPATIENT
Start: 2018-08-11 | End: 2018-08-14 | Stop reason: HOSPADM

## 2018-08-11 RX ORDER — AZITHROMYCIN 250 MG/1
500 TABLET, FILM COATED ORAL
Status: COMPLETED | OUTPATIENT
Start: 2018-08-11 | End: 2018-08-11

## 2018-08-11 RX ORDER — OXYCODONE AND ACETAMINOPHEN 10; 325 MG/1; MG/1
1 TABLET ORAL EVERY 8 HOURS PRN
Status: DISCONTINUED | OUTPATIENT
Start: 2018-08-11 | End: 2018-08-12

## 2018-08-11 RX ORDER — CYCLOBENZAPRINE HCL 5 MG
10 TABLET ORAL 3 TIMES DAILY PRN
Status: DISCONTINUED | OUTPATIENT
Start: 2018-08-11 | End: 2018-08-14 | Stop reason: HOSPADM

## 2018-08-11 RX ORDER — DICYCLOMINE HYDROCHLORIDE 10 MG/ML
20 INJECTION INTRAMUSCULAR
Status: COMPLETED | OUTPATIENT
Start: 2018-08-11 | End: 2018-08-11

## 2018-08-11 RX ORDER — AMLODIPINE BESYLATE 5 MG/1
10 TABLET ORAL DAILY
Status: DISCONTINUED | OUTPATIENT
Start: 2018-08-12 | End: 2018-08-14 | Stop reason: HOSPADM

## 2018-08-11 RX ADMIN — CEFTRIAXONE 1 G: 1 INJECTION, SOLUTION INTRAVENOUS at 01:08

## 2018-08-11 RX ADMIN — SODIUM CHLORIDE 1000 ML: 0.9 INJECTION, SOLUTION INTRAVENOUS at 09:08

## 2018-08-11 RX ADMIN — OXYCODONE HYDROCHLORIDE AND ACETAMINOPHEN 1 TABLET: 10; 325 TABLET ORAL at 06:08

## 2018-08-11 RX ADMIN — MORPHINE SULFATE 2 MG: 2 INJECTION, SOLUTION INTRAMUSCULAR; INTRAVENOUS at 04:08

## 2018-08-11 RX ADMIN — ACETAMINOPHEN 650 MG: 325 TABLET ORAL at 01:08

## 2018-08-11 RX ADMIN — FAMOTIDINE 20 MG: 20 TABLET ORAL at 10:08

## 2018-08-11 RX ADMIN — DICYCLOMINE HYDROCHLORIDE 20 MG: 10 INJECTION INTRAMUSCULAR at 10:08

## 2018-08-11 RX ADMIN — ACETAMINOPHEN 650 MG: 650 SOLUTION ORAL at 08:08

## 2018-08-11 RX ADMIN — SODIUM CHLORIDE 1000 ML: 0.9 INJECTION, SOLUTION INTRAVENOUS at 11:08

## 2018-08-11 RX ADMIN — INSULIN ASPART 3 UNITS: 100 INJECTION, SOLUTION INTRAVENOUS; SUBCUTANEOUS at 08:08

## 2018-08-11 RX ADMIN — AZITHROMYCIN MONOHYDRATE 500 MG: 250 TABLET ORAL at 01:08

## 2018-08-11 RX ADMIN — MORPHINE SULFATE 4 MG: 4 INJECTION INTRAVENOUS at 12:08

## 2018-08-11 RX ADMIN — KETOROLAC TROMETHAMINE 15 MG: 30 INJECTION, SOLUTION INTRAMUSCULAR at 09:08

## 2018-08-11 RX ADMIN — INSULIN DETEMIR 20 UNITS: 100 INJECTION, SOLUTION SUBCUTANEOUS at 08:08

## 2018-08-11 RX ADMIN — ONDANSETRON 4 MG: 2 INJECTION, SOLUTION INTRAMUSCULAR; INTRAVENOUS at 09:08

## 2018-08-11 RX ADMIN — ONDANSETRON 4 MG: 2 INJECTION, SOLUTION INTRAMUSCULAR; INTRAVENOUS at 04:08

## 2018-08-11 NOTE — PLAN OF CARE
Verbalized understanding of MOISE Message. All questions answered. Signed copy given.     08/11/18 1405   MOISE Message   Medicare Outpatient and Observation Notification regarding financial responsibility Given to patient/caregiver;Explained to patient/caregiver;Signed/date by patient/caregiver  (Spouse at bedside)   Date MOISE was signed 08/11/18   Time MOISE was signed 0082

## 2018-08-11 NOTE — PLAN OF CARE
Problem: Patient Care Overview  Goal: Plan of Care Review  Outcome: Ongoing (interventions implemented as appropriate)  Patient on room air saturations 86% with complaints of pain placed on 2L NC saturations 90-91% will monitor.

## 2018-08-11 NOTE — NURSING
Report received from ED nurse, Aleyda. Pt arrived to floor via stretcher and transferred to bed. IVF started, SCDs applied, oriented to room, call light placed within reach, bed low and locked, and family at bedside. Pt in fetal position complaining of pain 10/10. Pt. Is also diaphoretic and shaking. Will continue to monitor.

## 2018-08-11 NOTE — ED PROVIDER NOTES
Encounter Date: 8/11/2018    SCRIBE #1 NOTE: Norma TAYLOR, rossi scribing for, and in the presence of, Dr. Ballard.       History     Chief Complaint   Patient presents with    Abdominal Pain     abd pain since yesterday with n/v/d.      Time seen by provider: 9:47 AM    This is a 57 y.o. Male, with history of HTN, DM, Hepatitis C and COPD, who presents with complaint of abdominal pain that began yesterday morning. The burning pain is located in the periumbilical region. The patient reports diaphoresis, nausea, vomiting, and four episodes of diarrhea. He denies drinking adequate fluids. Patient reports experiencing similar symptoms when he was dehydrated.  No known sick contacts or food poisoning.  States he has been drinking a lot of Gatorade and Pedialyte since onset, but notes he has not been able to tolerate liquids. Patient notes coughing and wheezing consistent with COPD exacerbation also since yesterday. He denies fever, chills, chest pain, or SOB. Per patient, he was treated successfully for Hepatitis C about 6 months ago. He admits to use of tobacco but stopped since yesterday      The history is provided by the patient.     Review of patient's allergies indicates:  No Known Allergies  Past Medical History:   Diagnosis Date    Chronic back pain     Diabetes mellitus     Gallstones     Glaucoma (increased eye pressure)     Hepatitis C     Hypertension      Past Surgical History:   Procedure Laterality Date    CHOLECYSTECTOMY      COLONOSCOPY N/A 4/27/2016    Procedure: COLONOSCOPY;  Surgeon: Andre Sanchez MD;  Location: [facility];  Service: Endoscopy;  Laterality: N/A;     No family history on file.  Social History   Substance Use Topics    Smoking status: Current Every Day Smoker     Packs/day: 1.00     Types: Cigarettes    Smokeless tobacco: Never Used    Alcohol use No     Review of Systems   Constitutional: Positive for diaphoresis. Negative for chills and fever.   HENT: Negative for  sore throat and trouble swallowing.    Eyes: Negative for visual disturbance.   Respiratory: Positive for cough and wheezing. Negative for shortness of breath.    Cardiovascular: Negative for chest pain.   Gastrointestinal: Positive for abdominal pain, diarrhea, nausea and vomiting.   Endocrine: Negative.    Genitourinary: Negative for difficulty urinating and flank pain.   Musculoskeletal: Negative for back pain and neck pain.   Skin: Negative for wound.   Neurological: Negative for headaches.   Psychiatric/Behavioral: Negative for confusion.       Physical Exam     Initial Vitals [08/11/18 0919]   BP Pulse Resp Temp SpO2   (!) 152/101 104 20 98.6 °F (37 °C) 98 %      MAP       --         Physical Exam    Nursing note and vitals reviewed.  Constitutional: He appears well-developed and well-nourished. He is not diaphoretic. No distress.   HENT:   Head: Normocephalic and atraumatic.   Right Ear: External ear normal.   Left Ear: External ear normal.   Eyes: EOM are normal. Pupils are equal, round, and reactive to light. Right eye exhibits no discharge. Left eye exhibits no discharge.   Neck: Normal range of motion.   Cardiovascular: Normal rate, regular rhythm and normal heart sounds.   No murmur heard.  Pulmonary/Chest: Breath sounds normal. No respiratory distress. He has no wheezes. He has no rhonchi. He has no rales.   Abdominal: Soft. There is tenderness in the periumbilical area. There is no rebound and no guarding.   Musculoskeletal: Normal range of motion. He exhibits no edema or tenderness.   Neurological: He is alert and oriented to person, place, and time.   Skin: Skin is warm and dry. No rash and no abscess noted. No erythema. No pallor.   Psychiatric: He has a normal mood and affect. His behavior is normal. Judgment and thought content normal.         ED Course   Procedures  Labs Reviewed   CBC W/ AUTO DIFFERENTIAL - Abnormal; Notable for the following:        Result Value    WBC 18.27 (*)     Platelets  142 (*)     Gran # (ANC) 16.2 (*)     Lymph # 0.9 (*)     Mono # 1.1 (*)     Gran% 88.6 (*)     Lymph% 5.1 (*)     All other components within normal limits   COMPREHENSIVE METABOLIC PANEL - Abnormal; Notable for the following:     CO2 22 (*)     Glucose 257 (*)     BUN, Bld 21 (*)     Creatinine 1.5 (*)     Albumin 3.3 (*)     Anion Gap 21 (*)     eGFR if  58 (*)     eGFR if non  50 (*)     All other components within normal limits   URINALYSIS, REFLEX TO URINE CULTURE - Abnormal; Notable for the following:     Specific Gravity, UA >=1.030 (*)     Protein, UA 3+ (*)     Ketones, UA 2+ (*)     Bilirubin (UA) 1+ (*)     Occult Blood UA 3+ (*)     All other components within normal limits    Narrative:     Preferred Collection Type->Urine, Clean Catch   BETA - HYDROXYBUTYRATE, SERUM - Abnormal; Notable for the following:     Beta-Hydroxybutyrate 0.8 (*)     All other components within normal limits   URINALYSIS MICROSCOPIC - Abnormal; Notable for the following:     RBC, UA 5 (*)     Bacteria, UA Few (*)     Hyaline Casts, UA 7 (*)     Granular Casts, UA 1 (*)     All other components within normal limits    Narrative:     Preferred Collection Type->Urine, Clean Catch   LACTIC ACID, PLASMA - Abnormal; Notable for the following:     Lactate (Lactic Acid) 2.3 (*)     All other components within normal limits   POCT GLUCOSE - Abnormal; Notable for the following:     POCT Glucose 270 (*)     All other components within normal limits   POCT GLUCOSE - Abnormal; Notable for the following:     POCT Glucose 219 (*)     All other components within normal limits   CULTURE, BLOOD   CULTURE, BLOOD   LIPASE   MAGNESIUM   BETA - HYDROXYBUTYRATE, SERUM   BASIC METABOLIC PANEL   POCT GLUCOSE MONITORING CONTINUOUS     EKG Readings: (Independently Interpreted)   Sinus tachycardia at a rate of 102 bpm. No acute ischemia. No change from previous.       Imaging Results          CT Abdomen Pelvis  Without  Contrast (Final result)  Result time 08/11/18 11:56:52    Final result by Pauline Ordoñez MD (08/11/18 11:56:52)                 Impression:      Ground-glass opacity involving all basal segments of the left lower lobe and subsegmental areas of the middle lobe, lingula and right lower lobe concerning for pneumonia or aspiration.    Cholecystectomy.    Small right kidney cyst.    Bilateral adrenal gland hyperplasia      Electronically signed by: Pauline Ordoñez MD  Date:    08/11/2018  Time:    11:56             Narrative:    EXAMINATION:  CT ABDOMEN PELVIS WITHOUT CONTRAST    CLINICAL HISTORY:  Abdominal pain, unspecified;Periumbilical, elevated WBC;    TECHNIQUE:  Low dose axial images, sagittal and coronal reformations were obtained from the lung bases to the pubic symphysis.  Oral contrast was not administered.    COMPARISON:  04/24/2016    FINDINGS:  Patchy areas of ground-glass opacity involving all the basal segments of the left lower lobe and subsegmental areas of the middle lobe, lingula and subsegmental areas of the right lower lobe, nonspecific concerning for pneumonia or aspiration.    The noncontrast appearance of the liver, stomach, pancreas, spleen and bilateral kidneys appear normal.  Small hypoattenuating lesion of the right kidney consistent with a cyst.  There is  bilateral adrenal gland thickening which could be due to hyperplasia, no definite measurable mass.  Surgical clips right upper abdominal quadrant.  No inflammatory changes of the bowel seen, no bowel dilation, no significant stool retention.  The appendix is normal.  The bladder, prostate and seminal vesicles appear normal.  The inguinal regions appear normal.  The osseous structures demonstrate no osseous lesions.  There is facet joint osseous hypertrophy at L4-5 and L5-S1.                               X-Ray Abdomen Flat And Erect (Final result)  Result time 08/11/18 11:18:56    Final result by Pauline Ordoñez MD  (08/11/18 11:18:56)                 Impression:      No evidence of bowel obstruction.      Electronically signed by: Pauline Ordoñez MD  Date:    08/11/2018  Time:    11:18             Narrative:    EXAMINATION:  XR ABDOMEN FLAT AND ERECT    CLINICAL HISTORY:  Vomiting, unspecified    TECHNIQUE:  Flat and erect AP views of the abdomen were performed.    COMPARISON:  10/06/2015    FINDINGS:  No stool retention.  Nonspecific bowel gas pattern.  No bowel dilation.  The upright film demonstrate no air-fluid level.  No organomegaly.  No abnormal calcifications.  The lung bases are clear.  Surgical clips right upper abdominal quadrant.  Spondylosis facet joints lower lumbar spine.                               X-Ray Chest PA And Lateral (Final result)  Result time 08/11/18 10:16:13    Final result by Machelle Fletcher MD (08/11/18 10:16:13)                 Impression:      Left basilar atelectasis versus consolidation.    Degenerative change of the thoracic spine.      Electronically signed by: Machelle Fletcher MD  Date:    08/11/2018  Time:    10:16             Narrative:    EXAMINATION:  XR CHEST PA AND LATERAL    CLINICAL HISTORY:  Cough    TECHNIQUE:  PA and lateral views of the chest were performed.    COMPARISON:  Prior dated 12/13/2015    FINDINGS:  The mediastinal structures are midline.  The cardiac silhouette is not enlarged.  There is left basilar atelectasis versus consolidation.  No osseous abnormalities are identified.                                 Medical Decision Making:   Initial Assessment:       57-year-old male with history of DM, HTN, COPD, treated hep C presents with periumbilical abdominal pain and vomiting and diarrhea onset yesterday.  He also complains of associated cough and wheezing.  On arrival patient is afebrile with periumbilical tenderness but no acute abdomen.  No active wheezing, he is still active smoker.  Per chart review a history of partial SBO 2 years ago managed  conservatively, and ED visit 1 year ago for vomiting and diarrhea due to opiate withdrawal; he no longer takes opiates for chronic back pain.       Initial labs with elevated white count to 18; per chart review he has had chronic leukocytosis the last few CBCs in our system, including 21 last year.  Creatinine that was elevated at 2.0 on ED visit last year has not improved to 1.5, but he does have anion gap of 21 and glucose 257.  Concern for DKA; he states he took his insulin this morning, so will give 2 L IVF and repeat to see if it clears without insulin drip.  Initial abdominal x-ray with no sign of obstruction, but chest x-ray shows left basilar possible consolidation.   On reassessment he complains of persistent periumbilical pain but improved nausea.  Given elevated white count and persistent pain, CT abdomen done without contrast.  It showed no acute intra-abdominal etiology for pain, but does confirm pneumonia or aspiration left lower and middle lobe and right lower lobe.  No known aspiration, so we will treat for community-acquired pneumonia.  He spiked a fever to 102.3, so lactate checked and only 2.3, not consistent with sepsis.  Given multilobar pneumonia and concern for ability to tolerate p.o. antibiotics, will admit to hospitalist for IV antibiotics and observation.  Will repeat BMP and defer further management to hospitalist      Clinical Tests:   Lab Tests: Ordered and Reviewed  Radiological Study: Ordered and Reviewed            Scribe Attestation:   Scribe #1: I performed the above scribed service and the documentation accurately describes the services I performed. I attest to the accuracy of the note.    Attending Attestation:           Physician Attestation for Scribe:  Physician Attestation Statement for Scribe #1: I, Dr. Ballard, reviewed documentation, as scribed by Norma Contreras in my presence, and it is both accurate and complete.                    Clinical Impression:     1. Pneumonia  of both lower lobes due to infectious organism    2. Cough    3. Vomiting                                   Joss Ballard MD  08/11/18 [id number]

## 2018-08-11 NOTE — ED TRIAGE NOTES
Pt reports n/v/d that started yesterday. Reports hx of COPD and coughing. Reports lower abd pain with sweats and chills. Reports compliance with insulin.

## 2018-08-11 NOTE — PLAN OF CARE
PCP is Dr Chinchilla at [facility]. Pharmacy of choice is [facility] on [address]; updated in Epic. Independent with ADL's. Denies use of DME.      08/11/18 1342   Discharge Assessment   Assessment Type Discharge Planning Assessment   Confirmed/corrected address and phone number on facesheet? Yes   Assessment information obtained from? Patient  (spouse at bedside)   Expected Length of Stay (days) 1   Communicated expected length of stay with patient/caregiver yes   Prior to hospitilization cognitive status: Alert/Oriented   Prior to hospitalization functional status: Independent   Current cognitive status: Alert/Oriented   Current Functional Status: Independent   Lives With spouse   Able to Return to Prior Arrangements yes   Is patient able to care for self after discharge? Yes   Who are your caregiver(s) and their phone number(s)? Marika Al (spouse)   Patient's perception of discharge disposition home or selfcare   Readmission Within The Last 30 Days no previous admission in last 30 days   Patient currently being followed by outpatient case management? No   Patient currently receives any other outside agency services? No   Equipment Currently Used at Home none   Do you have any problems affording any of your prescribed medications? No   Is the patient taking medications as prescribed? yes   Does the patient have transportation home? Yes   Transportation Available family or friend will provide   Does the patient receive services at the [facility]? No   Discharge Plan A Home with family   Discharge Plan B Home with family   Patient/Family In Agreement With Plan yes    Lives with spouse; who will transport home upon discharge. No needs identified currently. CM to follow.

## 2018-08-12 LAB
ANION GAP SERPL CALC-SCNC: 8 MMOL/L
BASOPHILS # BLD AUTO: 0.01 K/UL
BASOPHILS NFR BLD: 0.1 %
BUN SERPL-MCNC: 18 MG/DL
CALCIUM SERPL-MCNC: 8.6 MG/DL
CHLORIDE SERPL-SCNC: 104 MMOL/L
CO2 SERPL-SCNC: 29 MMOL/L
CREAT SERPL-MCNC: 1.2 MG/DL
DIFFERENTIAL METHOD: ABNORMAL
EOSINOPHIL # BLD AUTO: 0 K/UL
EOSINOPHIL NFR BLD: 0 %
ERYTHROCYTE [DISTWIDTH] IN BLOOD BY AUTOMATED COUNT: 13.3 %
EST. GFR  (AFRICAN AMERICAN): >60 ML/MIN/1.73 M^2
EST. GFR  (NON AFRICAN AMERICAN): >60 ML/MIN/1.73 M^2
GLUCOSE SERPL-MCNC: 119 MG/DL
HCT VFR BLD AUTO: 44.1 %
HGB BLD-MCNC: 14.8 G/DL
LYMPHOCYTES # BLD AUTO: 1.3 K/UL
LYMPHOCYTES NFR BLD: 9.9 %
MAGNESIUM SERPL-MCNC: 2 MG/DL
MCH RBC QN AUTO: 28.6 PG
MCHC RBC AUTO-ENTMCNC: 33.6 G/DL
MCV RBC AUTO: 85 FL
MONOCYTES # BLD AUTO: 0.7 K/UL
MONOCYTES NFR BLD: 5.3 %
NEUTROPHILS # BLD AUTO: 11.2 K/UL
NEUTROPHILS NFR BLD: 84.4 %
PHOSPHATE SERPL-MCNC: 2.2 MG/DL
PLATELET # BLD AUTO: 131 K/UL
PMV BLD AUTO: 11.4 FL
POCT GLUCOSE: 169 MG/DL (ref 70–110)
POCT GLUCOSE: 192 MG/DL (ref 70–110)
POCT GLUCOSE: 201 MG/DL (ref 70–110)
POCT GLUCOSE: 244 MG/DL (ref 70–110)
POTASSIUM SERPL-SCNC: 4 MMOL/L
RBC # BLD AUTO: 5.18 M/UL
SODIUM SERPL-SCNC: 141 MMOL/L
WBC # BLD AUTO: 13.31 K/UL

## 2018-08-12 PROCEDURE — 94761 N-INVAS EAR/PLS OXIMETRY MLT: CPT

## 2018-08-12 PROCEDURE — 63600175 PHARM REV CODE 636 W HCPCS: Performed by: PHYSICIAN ASSISTANT

## 2018-08-12 PROCEDURE — 25000003 PHARM REV CODE 250: Performed by: INTERNAL MEDICINE

## 2018-08-12 PROCEDURE — 63600175 PHARM REV CODE 636 W HCPCS: Performed by: EMERGENCY MEDICINE

## 2018-08-12 PROCEDURE — 11000001 HC ACUTE MED/SURG PRIVATE ROOM

## 2018-08-12 PROCEDURE — 84100 ASSAY OF PHOSPHORUS: CPT

## 2018-08-12 PROCEDURE — 63600175 PHARM REV CODE 636 W HCPCS: Performed by: INTERNAL MEDICINE

## 2018-08-12 PROCEDURE — 83735 ASSAY OF MAGNESIUM: CPT

## 2018-08-12 PROCEDURE — 80048 BASIC METABOLIC PNL TOTAL CA: CPT

## 2018-08-12 PROCEDURE — 36415 COLL VENOUS BLD VENIPUNCTURE: CPT

## 2018-08-12 PROCEDURE — 99233 SBSQ HOSP IP/OBS HIGH 50: CPT | Mod: ,,, | Performed by: INTERNAL MEDICINE

## 2018-08-12 PROCEDURE — 85025 COMPLETE CBC W/AUTO DIFF WBC: CPT

## 2018-08-12 PROCEDURE — 25000003 PHARM REV CODE 250: Performed by: PHYSICIAN ASSISTANT

## 2018-08-12 PROCEDURE — 27000221 HC OXYGEN, UP TO 24 HOURS

## 2018-08-12 RX ORDER — BENZONATATE 100 MG/1
100 CAPSULE ORAL 3 TIMES DAILY PRN
Status: DISCONTINUED | OUTPATIENT
Start: 2018-08-12 | End: 2018-08-14 | Stop reason: HOSPADM

## 2018-08-12 RX ORDER — OXYCODONE HYDROCHLORIDE 5 MG/1
10 TABLET ORAL EVERY 6 HOURS PRN
Status: DISCONTINUED | OUTPATIENT
Start: 2018-08-12 | End: 2018-08-13

## 2018-08-12 RX ADMIN — HYDROCHLOROTHIAZIDE 12.5 MG: 12.5 TABLET ORAL at 09:08

## 2018-08-12 RX ADMIN — INSULIN DETEMIR 20 UNITS: 100 INJECTION, SOLUTION SUBCUTANEOUS at 08:08

## 2018-08-12 RX ADMIN — INSULIN ASPART 2 UNITS: 100 INJECTION, SOLUTION INTRAVENOUS; SUBCUTANEOUS at 08:08

## 2018-08-12 RX ADMIN — ONDANSETRON 4 MG: 2 INJECTION, SOLUTION INTRAMUSCULAR; INTRAVENOUS at 10:08

## 2018-08-12 RX ADMIN — ACETAMINOPHEN 650 MG: 650 SOLUTION ORAL at 02:08

## 2018-08-12 RX ADMIN — BENZONATATE 100 MG: 100 CAPSULE ORAL at 08:08

## 2018-08-12 RX ADMIN — SODIUM CHLORIDE: 0.9 INJECTION, SOLUTION INTRAVENOUS at 12:08

## 2018-08-12 RX ADMIN — OXYCODONE HYDROCHLORIDE 10 MG: 5 TABLET ORAL at 09:08

## 2018-08-12 RX ADMIN — OXYCODONE HYDROCHLORIDE AND ACETAMINOPHEN 1 TABLET: 10; 325 TABLET ORAL at 09:08

## 2018-08-12 RX ADMIN — OXYCODONE HYDROCHLORIDE AND ACETAMINOPHEN 1 TABLET: 10; 325 TABLET ORAL at 02:08

## 2018-08-12 RX ADMIN — OXYCODONE HYDROCHLORIDE AND ACETAMINOPHEN 1 TABLET: 10; 325 TABLET ORAL at 05:08

## 2018-08-12 RX ADMIN — AMLODIPINE BESYLATE 10 MG: 5 TABLET ORAL at 09:08

## 2018-08-12 RX ADMIN — CEFTRIAXONE 1 G: 1 INJECTION, SOLUTION INTRAVENOUS at 01:08

## 2018-08-12 RX ADMIN — ONDANSETRON 4 MG: 2 INJECTION, SOLUTION INTRAMUSCULAR; INTRAVENOUS at 09:08

## 2018-08-12 RX ADMIN — DICYCLOMINE HYDROCHLORIDE 20 MG: 10 INJECTION INTRAMUSCULAR at 12:08

## 2018-08-12 RX ADMIN — ENALAPRIL MALEATE 20 MG: 20 TABLET ORAL at 09:08

## 2018-08-12 RX ADMIN — AZITHROMYCIN MONOHYDRATE 500 MG: 250 TABLET ORAL at 09:08

## 2018-08-12 RX ADMIN — BENZONATATE 100 MG: 100 CAPSULE ORAL at 06:08

## 2018-08-12 RX ADMIN — ONDANSETRON 4 MG: 2 INJECTION, SOLUTION INTRAMUSCULAR; INTRAVENOUS at 02:08

## 2018-08-12 NOTE — PLAN OF CARE
Problem: Patient Care Overview  Goal: Interdisciplinary Rounds/Family Conf  Outcome: Ongoing (interventions implemented as appropriate)   Remains free from fall, injury, and skin breakdown. Ambulates independently to bathroom. Positions self independently. Pain controlled with PO PRN meds. Tolerating ordered diet. IV site WNL. Plan of care reviewed with patient and all questions answered. Bed low, locked w/ bed alarm on. Call light within reach. Purposeful rounding performed. No other complaints at this time.

## 2018-08-12 NOTE — HPI
56 yo male with a PMH of HTN, DM, Hep C successfully treated last year with Harvoni and COPD who presents to the ED c/o periumbilical abdominal pain, vomiting and loose stools for 1 day.  Patient reports that he had decreased oral intake since symptoms begin.  Denies fever, chills, chest pain, or shortness of breath.  Admits that he has a difficult time controlling pain due to chronic narcotic use for back pain. Reports that he quit smoking yesterday and does not drink alcohol due to diagnosis of hepatitis C.  CT of abdomin was performed with no significant abdominal finding but was significant for bilateral lower lobe pneumonia. Patient admitted for further eval and treatment.

## 2018-08-12 NOTE — PLAN OF CARE
Problem: Patient Care Overview  Goal: Plan of Care Review  Outcome: Ongoing (interventions implemented as appropriate)  Pt on 3L NC. Sats 94%. No distress noted. Will continue to monitor.

## 2018-08-12 NOTE — PLAN OF CARE
Problem: Patient Care Overview  Goal: Plan of Care Review  Outcome: Ongoing (interventions implemented as appropriate)  Pt on 2 lpm nasal cannula, SpO2 93%.

## 2018-08-12 NOTE — ASSESSMENT & PLAN NOTE
Started Rocephin and Zithromax  WBC 18, will monitor daily  Afebrile, monitor temp curve  Will discharge when clinically improved  Daily CXRs

## 2018-08-12 NOTE — PLAN OF CARE
Problem: Patient Care Overview  Goal: Plan of Care Review  Outcome: Ongoing (interventions implemented as appropriate)  AAOx4.  NAD noted.  Pt remained free from injury or falls.  Pt remains free from skin breakdowns. Vitals signs stable throughout shifty on RA.  Positions self independently.  Voiding adequately throughout shift.  Pain managed with IV and PO medication.  Pt exhibited elevated temp this shift, MD notified.  Fluids and antipyretics administered. Temp rechecked and began to decrease.  Purposeful rounding done.  All needs met.  Will continue to monitor.

## 2018-08-12 NOTE — SUBJECTIVE & OBJECTIVE
Past Medical History:   Diagnosis Date    Chronic back pain     Diabetes mellitus     Gallstones     Glaucoma (increased eye pressure)     Hepatitis C     Hypertension        Past Surgical History:   Procedure Laterality Date    CHOLECYSTECTOMY      COLONOSCOPY N/A 4/27/2016    Procedure: COLONOSCOPY;  Surgeon: Andre Sanchez MD;  Location: [facility];  Service: Endoscopy;  Laterality: N/A;       Review of patient's allergies indicates:  No Known Allergies    No current facility-administered medications on file prior to encounter.      Current Outpatient Prescriptions on File Prior to Encounter   Medication Sig    amlodipine (NORVASC) 10 MG tablet Take 10 mg by mouth once daily.    BD INSULIN SYRINGE ULTRA-FINE 0.5 mL 31 gauge x 5/16 Syrg INJECT 3 TIMES A DAY AS DIRECTED    clobetasol (TEMOVATE) 0.05 % cream     cloNIDine (CATAPRES) 0.1 MG tablet     diclofenac sodium 1.5 % Drop     enalapril (VASOTEC) 20 MG tablet Take 20 mg by mouth once daily.    HUMALOG 100 unit/mL injection INJECT 2 TO 5 UNITS WITH EACH MEAL AS DIRECTED.    hydrochlorothiazide (HYDRODIURIL) 12.5 MG Tab Take 12.5 mg by mouth once daily.    hydrochlorothiazide (MICROZIDE) 12.5 mg capsule Take 12.5 mg by mouth once daily.    hydrocodone-acetaminophen 10-325mg (NORCO)  mg Tab Take by mouth.    ibuprofen (ADVIL,MOTRIN) 800 MG tablet Take 1 tablet (800 mg total) by mouth every 6 (six) hours as needed for Pain.    insulin aspart (NOVOLOG) 100 unit/mL injection Inject 5 Units into the skin 3 (three) times daily before meals.    insulin glargine (LANTUS) 100 unit/mL injection Inject 30 Units into the skin every evening.    ondansetron (ZOFRAN) 4 MG tablet Take 1 tablet (4 mg total) by mouth every 6 (six) hours as needed for Nausea.    ONETOUCH ULTRA TEST Strp TEST TWICE DAILY.    oxycodone-acetaminophen (PERCOCET) 7.5-325 mg per tablet TAKE 1 TABLET BY MOUTH 3 TIME DAILY AS NEEDED    oxycodone-acetaminophen  mg  (PERCOCET)  mg per tablet Take 1 tablet by mouth 3 (three) times daily as needed.    promethazine (PHENERGAN) 25 MG tablet      Family History     None        Social History Main Topics    Smoking status: Current Every Day Smoker     Packs/day: 1.00     Types: Cigarettes    Smokeless tobacco: Never Used    Alcohol use No    Drug use: No    Sexual activity: Not on file     Review of Systems   Constitutional: Positive for appetite change and diaphoresis. Negative for chills, fatigue and fever.   Respiratory: Negative for shortness of breath and wheezing.    Cardiovascular: Negative for chest pain and leg swelling.   Gastrointestinal: Positive for abdominal pain, diarrhea, nausea and vomiting.   Genitourinary: Negative for difficulty urinating and dysuria.   Musculoskeletal: Positive for back pain.   Neurological: Negative for dizziness and speech difficulty.   Psychiatric/Behavioral: Negative for agitation and behavioral problems.     Objective:     Vital Signs (Most Recent):  Temp: (!) 101 °F (38.3 °C) (08/11/18 2010)  Pulse: 88 (08/11/18 2010)  Resp: 16 (08/11/18 2010)  BP: (!) 132/90 (08/11/18 2010)  SpO2: (!) 93 % (08/11/18 2023) Vital Signs (24h Range):  Temp:  [98.6 °F (37 °C)-102.3 °F (39.1 °C)] 101 °F (38.3 °C)  Pulse:  [] 88  Resp:  [16-20] 16  SpO2:  [90 %-98 %] 93 %  BP: (123-167)/() 132/90     Weight: 86.2 kg (190 lb)  Body mass index is 28.06 kg/m².    Physical Exam   Constitutional: He is oriented to person, place, and time. He appears well-developed and well-nourished.   HENT:   Head: Normocephalic and atraumatic.   Eyes: Conjunctivae and EOM are normal. Pupils are equal, round, and reactive to light.   Neck: Normal range of motion. Neck supple.   Cardiovascular: Normal rate and regular rhythm.    Pulmonary/Chest: Effort normal and breath sounds normal.   Abdominal: Soft. Bowel sounds are normal.   Musculoskeletal: Normal range of motion.   Neurological: He is alert and oriented  to person, place, and time.   Skin: Skin is warm and dry.   Psychiatric: He has a normal mood and affect. His behavior is normal.         CRANIAL NERVES     CN III, IV, VI   Pupils are equal, round, and reactive to light.  Extraocular motions are normal.        Significant Labs:   CBC:   Recent Labs  Lab 08/11/18  0953   WBC 18.27*   HGB 16.6   HCT 48.4   *     CMP:   Recent Labs  Lab 08/11/18  0953 08/11/18  1412    139   K 4.5 3.7   CL 98 104   CO2 22* 23   * 245*   BUN 21* 21*   CREATININE 1.5* 1.2   CALCIUM 9.5 8.3*   PROT 8.3  --    ALBUMIN 3.3*  --    BILITOT 0.5  --    ALKPHOS 95  --    AST 31  --    ALT 19  --    ANIONGAP 21* 12   EGFRNONAA 50* >60     All pertinent labs within the past 24 hours have been reviewed.    Significant Imaging: I have reviewed all pertinent imaging results/findings within the past 24 hours.   Imaging Results          CT Abdomen Pelvis  Without Contrast (Final result)  Result time 08/11/18 11:56:52    Final result by Pauline Ordoñez MD (08/11/18 11:56:52)                 Impression:      Ground-glass opacity involving all basal segments of the left lower lobe and subsegmental areas of the middle lobe, lingula and right lower lobe concerning for pneumonia or aspiration.    Cholecystectomy.    Small right kidney cyst.    Bilateral adrenal gland hyperplasia      Electronically signed by: Pauline Ordoñez MD  Date:    08/11/2018  Time:    11:56             Narrative:    EXAMINATION:  CT ABDOMEN PELVIS WITHOUT CONTRAST    CLINICAL HISTORY:  Abdominal pain, unspecified;Periumbilical, elevated WBC;    TECHNIQUE:  Low dose axial images, sagittal and coronal reformations were obtained from the lung bases to the pubic symphysis.  Oral contrast was not administered.    COMPARISON:  04/24/2016    FINDINGS:  Patchy areas of ground-glass opacity involving all the basal segments of the left lower lobe and subsegmental areas of the middle lobe, lingula and subsegmental  areas of the right lower lobe, nonspecific concerning for pneumonia or aspiration.    The noncontrast appearance of the liver, stomach, pancreas, spleen and bilateral kidneys appear normal.  Small hypoattenuating lesion of the right kidney consistent with a cyst.  There is  bilateral adrenal gland thickening which could be due to hyperplasia, no definite measurable mass.  Surgical clips right upper abdominal quadrant.  No inflammatory changes of the bowel seen, no bowel dilation, no significant stool retention.  The appendix is normal.  The bladder, prostate and seminal vesicles appear normal.  The inguinal regions appear normal.  The osseous structures demonstrate no osseous lesions.  There is facet joint osseous hypertrophy at L4-5 and L5-S1.                               X-Ray Abdomen Flat And Erect (Final result)  Result time 08/11/18 11:18:56    Final result by Pauline Ordoñez MD (08/11/18 11:18:56)                 Impression:      No evidence of bowel obstruction.      Electronically signed by: Pauline Ordoñez MD  Date:    08/11/2018  Time:    11:18             Narrative:    EXAMINATION:  XR ABDOMEN FLAT AND ERECT    CLINICAL HISTORY:  Vomiting, unspecified    TECHNIQUE:  Flat and erect AP views of the abdomen were performed.    COMPARISON:  10/06/2015    FINDINGS:  No stool retention.  Nonspecific bowel gas pattern.  No bowel dilation.  The upright film demonstrate no air-fluid level.  No organomegaly.  No abnormal calcifications.  The lung bases are clear.  Surgical clips right upper abdominal quadrant.  Spondylosis facet joints lower lumbar spine.                               X-Ray Chest PA And Lateral (Final result)  Result time 08/11/18 10:16:13    Final result by Machelle Fletcher MD (08/11/18 10:16:13)                 Impression:      Left basilar atelectasis versus consolidation.    Degenerative change of the thoracic spine.      Electronically signed by: Machelle Fletcher  MD  Date:    08/11/2018  Time:    10:16             Narrative:    EXAMINATION:  XR CHEST PA AND LATERAL    CLINICAL HISTORY:  Cough    TECHNIQUE:  PA and lateral views of the chest were performed.    COMPARISON:  Prior dated 12/13/2015    FINDINGS:  The mediastinal structures are midline.  The cardiac silhouette is not enlarged.  There is left basilar atelectasis versus consolidation.  No osseous abnormalities are identified.

## 2018-08-12 NOTE — H&P
[facility] Hospital Medicine  History & Physical     Patient Name: Emery Barragan  MRN: [id number]  Admission Date: 8/11/2018  Attending Physician: Shona Portillo, *   Primary Care Provider: [facility]         Patient information was obtained from patient and ER records.     Subjective:     Principal Problem:Pneumonia of both lower lobes due to infectious organism    Chief Complaint:   Chief Complaint   Patient presents with    Abdominal Pain     abd pain since yesterday with n/v/d.         HPI: 56 yo male with a PMH of HTN, DM, Hep C successfully treated last year and COPD who presents to the ED c/o abdominal pain x 1 day.  Patient states abdominal pain is periumbilical and he associates it with nausea and vomiting and loose stools.  Patient reports that he had decreased dietary input since symptoms begin.  Denies fever, chills, chest pain, or shortness of breath.  Admits that he has a difficult time controlling pain due to chronic narcotic use for back pain. Reports that he quit smoking yesterday and does not drink alcohol due to diagnosis of hepatitis C.  CT of abdomin performed with no significant abdominal finding but significant for bilateral lower lobe pneumonia. Patient admitted for further eval and treatment.    Past Medical History:   Diagnosis Date    Chronic back pain     Diabetes mellitus     Gallstones     Glaucoma (increased eye pressure)     Hepatitis C     Hypertension        Past Surgical History:   Procedure Laterality Date    CHOLECYSTECTOMY      COLONOSCOPY N/A 4/27/2016    Procedure: COLONOSCOPY;  Surgeon: Andre Sanchez MD;  Location: [facility];  Service: Endoscopy;  Laterality: N/A;       Review of patient's allergies indicates:  No Known Allergies    No current facility-administered medications on file prior to encounter.      Current Outpatient Prescriptions on File Prior to Encounter   Medication Sig    amlodipine (NORVASC) 10 MG tablet Take  10 mg by mouth once daily.    BD INSULIN SYRINGE ULTRA-FINE 0.5 mL 31 gauge x 5/16 Syrg INJECT 3 TIMES A DAY AS DIRECTED    clobetasol (TEMOVATE) 0.05 % cream     cloNIDine (CATAPRES) 0.1 MG tablet     diclofenac sodium 1.5 % Drop     enalapril (VASOTEC) 20 MG tablet Take 20 mg by mouth once daily.    HUMALOG 100 unit/mL injection INJECT 2 TO 5 UNITS WITH EACH MEAL AS DIRECTED.    hydrochlorothiazide (HYDRODIURIL) 12.5 MG Tab Take 12.5 mg by mouth once daily.    hydrochlorothiazide (MICROZIDE) 12.5 mg capsule Take 12.5 mg by mouth once daily.    hydrocodone-acetaminophen 10-325mg (NORCO)  mg Tab Take by mouth.    ibuprofen (ADVIL,MOTRIN) 800 MG tablet Take 1 tablet (800 mg total) by mouth every 6 (six) hours as needed for Pain.    insulin aspart (NOVOLOG) 100 unit/mL injection Inject 5 Units into the skin 3 (three) times daily before meals.    insulin glargine (LANTUS) 100 unit/mL injection Inject 30 Units into the skin every evening.    ondansetron (ZOFRAN) 4 MG tablet Take 1 tablet (4 mg total) by mouth every 6 (six) hours as needed for Nausea.    ONETOUCH ULTRA TEST Strp TEST TWICE DAILY.    oxycodone-acetaminophen (PERCOCET) 7.5-325 mg per tablet TAKE 1 TABLET BY MOUTH 3 TIME DAILY AS NEEDED    oxycodone-acetaminophen  mg (PERCOCET)  mg per tablet Take 1 tablet by mouth 3 (three) times daily as needed.    promethazine (PHENERGAN) 25 MG tablet      Family History     None        Social History Main Topics    Smoking status: Current Every Day Smoker     Packs/day: 1.00     Types: Cigarettes    Smokeless tobacco: Never Used    Alcohol use No    Drug use: No    Sexual activity: Not on file     Review of Systems   Constitutional: Positive for appetite change and diaphoresis. Negative for chills, fatigue and fever.   Respiratory: Negative for shortness of breath and wheezing.    Cardiovascular: Negative for chest pain and leg swelling.   Gastrointestinal: Positive for abdominal  pain, diarrhea, nausea and vomiting.   Genitourinary: Negative for difficulty urinating and dysuria.   Musculoskeletal: Positive for back pain.   Neurological: Negative for dizziness and speech difficulty.   Psychiatric/Behavioral: Negative for agitation and behavioral problems.     Objective:     Vital Signs (Most Recent):  Temp: (!) 101 °F (38.3 °C) (08/11/18 2010)  Pulse: 88 (08/11/18 2010)  Resp: 16 (08/11/18 2010)  BP: (!) 132/90 (08/11/18 2010)  SpO2: (!) 93 % (08/11/18 2023) Vital Signs (24h Range):  Temp:  [98.6 °F (37 °C)-102.3 °F (39.1 °C)] 101 °F (38.3 °C)  Pulse:  [] 88  Resp:  [16-20] 16  SpO2:  [90 %-98 %] 93 %  BP: (123-167)/() 132/90     Weight: 86.2 kg (190 lb)  Body mass index is 28.06 kg/m².    Physical Exam   Constitutional: He is oriented to person, place, and time. He appears well-developed and well-nourished.   HENT:   Head: Normocephalic and atraumatic.   Eyes: Conjunctivae and EOM are normal. Pupils are equal, round, and reactive to light.   Neck: Normal range of motion. Neck supple.   Cardiovascular: Normal rate and regular rhythm.    Pulmonary/Chest: Effort normal and breath sounds normal.   Abdominal: Soft. Bowel sounds are normal.   Musculoskeletal: Normal range of motion.   Neurological: He is alert and oriented to person, place, and time.   Skin: Skin is warm and dry.   Psychiatric: He has a normal mood and affect. His behavior is normal.         CRANIAL NERVES     CN III, IV, VI   Pupils are equal, round, and reactive to light.  Extraocular motions are normal.        Significant Labs:   CBC:   Recent Labs  Lab 08/11/18  0953   WBC 18.27*   HGB 16.6   HCT 48.4   *     CMP:   Recent Labs  Lab 08/11/18  0953 08/11/18  1412    139   K 4.5 3.7   CL 98 104   CO2 22* 23   * 245*   BUN 21* 21*   CREATININE 1.5* 1.2   CALCIUM 9.5 8.3*   PROT 8.3  --    ALBUMIN 3.3*  --    BILITOT 0.5  --    ALKPHOS 95  --    AST 31  --    ALT 19  --    ANIONGAP 21* 12    EGFRNONAA 50* >60     All pertinent labs within the past 24 hours have been reviewed.    Significant Imaging: I have reviewed all pertinent imaging results/findings within the past 24 hours.   Imaging Results          CT Abdomen Pelvis  Without Contrast (Final result)  Result time 08/11/18 11:56:52    Final result by Pauline Ordoñez MD (08/11/18 11:56:52)                 Impression:      Ground-glass opacity involving all basal segments of the left lower lobe and subsegmental areas of the middle lobe, lingula and right lower lobe concerning for pneumonia or aspiration.    Cholecystectomy.    Small right kidney cyst.    Bilateral adrenal gland hyperplasia      Electronically signed by: Pauline Ordoñez MD  Date:    08/11/2018  Time:    11:56             Narrative:    EXAMINATION:  CT ABDOMEN PELVIS WITHOUT CONTRAST    CLINICAL HISTORY:  Abdominal pain, unspecified;Periumbilical, elevated WBC;    TECHNIQUE:  Low dose axial images, sagittal and coronal reformations were obtained from the lung bases to the pubic symphysis.  Oral contrast was not administered.    COMPARISON:  04/24/2016    FINDINGS:  Patchy areas of ground-glass opacity involving all the basal segments of the left lower lobe and subsegmental areas of the middle lobe, lingula and subsegmental areas of the right lower lobe, nonspecific concerning for pneumonia or aspiration.    The noncontrast appearance of the liver, stomach, pancreas, spleen and bilateral kidneys appear normal.  Small hypoattenuating lesion of the right kidney consistent with a cyst.  There is  bilateral adrenal gland thickening which could be due to hyperplasia, no definite measurable mass.  Surgical clips right upper abdominal quadrant.  No inflammatory changes of the bowel seen, no bowel dilation, no significant stool retention.  The appendix is normal.  The bladder, prostate and seminal vesicles appear normal.  The inguinal regions appear normal.  The osseous structures  demonstrate no osseous lesions.  There is facet joint osseous hypertrophy at L4-5 and L5-S1.                               X-Ray Abdomen Flat And Erect (Final result)  Result time 08/11/18 11:18:56    Final result by Pauline Ordoñez MD (08/11/18 11:18:56)                 Impression:      No evidence of bowel obstruction.      Electronically signed by: Pauline Ordoñez MD  Date:    08/11/2018  Time:    11:18             Narrative:    EXAMINATION:  XR ABDOMEN FLAT AND ERECT    CLINICAL HISTORY:  Vomiting, unspecified    TECHNIQUE:  Flat and erect AP views of the abdomen were performed.    COMPARISON:  10/06/2015    FINDINGS:  No stool retention.  Nonspecific bowel gas pattern.  No bowel dilation.  The upright film demonstrate no air-fluid level.  No organomegaly.  No abnormal calcifications.  The lung bases are clear.  Surgical clips right upper abdominal quadrant.  Spondylosis facet joints lower lumbar spine.                               X-Ray Chest PA And Lateral (Final result)  Result time 08/11/18 10:16:13    Final result by Machelle Fletcher MD (08/11/18 10:16:13)                 Impression:      Left basilar atelectasis versus consolidation.    Degenerative change of the thoracic spine.      Electronically signed by: Machelle Fletcher MD  Date:    08/11/2018  Time:    10:16             Narrative:    EXAMINATION:  XR CHEST PA AND LATERAL    CLINICAL HISTORY:  Cough    TECHNIQUE:  PA and lateral views of the chest were performed.    COMPARISON:  Prior dated 12/13/2015    FINDINGS:  The mediastinal structures are midline.  The cardiac silhouette is not enlarged.  There is left basilar atelectasis versus consolidation.  No osseous abnormalities are identified.                                  Assessment/Plan:     * Pneumonia of both lower lobes due to infectious organism    Started Rocephin and Zithromax  WBC 18, will monitor daily  Afebrile, monitor temp curve  Will discharge when clinically  improved  Daily CXRs          Tobacco abuse    Nicotine patch          Essential hypertension    Restart antihypertensives  Adjust regimen based on blood pressure trends        Type 2 diabetes mellitus without complication    Will start lower dose of long acting as patient on clear liquids as well as SSI  Adjust regimen based on blood glucose trends          VTE Risk Mitigation         Ordered     Place RAZIA hose  Until discontinued      08/11/18 1848     IP VTE LOW RISK PATIENT  Once      08/11/18 1559     Place sequential compression device  Until discontinued      08/11/18 1559             Shona Portillo MD  Department of Hospital Medicine   [facility]

## 2018-08-12 NOTE — ASSESSMENT & PLAN NOTE
Will start lower dose of long acting as patient on clear liquids as well as SSI  Adjust regimen based on blood glucose trends

## 2018-08-13 LAB
ANION GAP SERPL CALC-SCNC: 10 MMOL/L
BASOPHILS # BLD AUTO: 0.01 K/UL
BASOPHILS NFR BLD: 0.1 %
BUN SERPL-MCNC: 15 MG/DL
CALCIUM SERPL-MCNC: 8.4 MG/DL
CHLORIDE SERPL-SCNC: 103 MMOL/L
CO2 SERPL-SCNC: 26 MMOL/L
CREAT SERPL-MCNC: 1 MG/DL
DIFFERENTIAL METHOD: ABNORMAL
EOSINOPHIL # BLD AUTO: 0 K/UL
EOSINOPHIL NFR BLD: 0.3 %
ERYTHROCYTE [DISTWIDTH] IN BLOOD BY AUTOMATED COUNT: 13.3 %
EST. GFR  (AFRICAN AMERICAN): >60 ML/MIN/1.73 M^2
EST. GFR  (NON AFRICAN AMERICAN): >60 ML/MIN/1.73 M^2
GLUCOSE SERPL-MCNC: 110 MG/DL
HCT VFR BLD AUTO: 42.1 %
HGB BLD-MCNC: 14.1 G/DL
LYMPHOCYTES # BLD AUTO: 1.6 K/UL
LYMPHOCYTES NFR BLD: 14.3 %
MAGNESIUM SERPL-MCNC: 2 MG/DL
MCH RBC QN AUTO: 28.3 PG
MCHC RBC AUTO-ENTMCNC: 33.5 G/DL
MCV RBC AUTO: 85 FL
MONOCYTES # BLD AUTO: 0.9 K/UL
MONOCYTES NFR BLD: 7.6 %
NEUTROPHILS # BLD AUTO: 8.8 K/UL
NEUTROPHILS NFR BLD: 77.3 %
PHOSPHATE SERPL-MCNC: 1.7 MG/DL
PLATELET # BLD AUTO: 135 K/UL
PMV BLD AUTO: 11.8 FL
POCT GLUCOSE: 125 MG/DL (ref 70–110)
POCT GLUCOSE: 128 MG/DL (ref 70–110)
POCT GLUCOSE: 170 MG/DL (ref 70–110)
POCT GLUCOSE: 174 MG/DL (ref 70–110)
POCT GLUCOSE: 215 MG/DL (ref 70–110)
POCT GLUCOSE: 91 MG/DL (ref 70–110)
POTASSIUM SERPL-SCNC: 3.6 MMOL/L
RBC # BLD AUTO: 4.98 M/UL
SODIUM SERPL-SCNC: 139 MMOL/L
WBC # BLD AUTO: 11.41 K/UL

## 2018-08-13 PROCEDURE — 93005 ELECTROCARDIOGRAM TRACING: CPT

## 2018-08-13 PROCEDURE — 63600175 PHARM REV CODE 636 W HCPCS: Performed by: INTERNAL MEDICINE

## 2018-08-13 PROCEDURE — 36415 COLL VENOUS BLD VENIPUNCTURE: CPT

## 2018-08-13 PROCEDURE — 94761 N-INVAS EAR/PLS OXIMETRY MLT: CPT

## 2018-08-13 PROCEDURE — 25000003 PHARM REV CODE 250: Performed by: INTERNAL MEDICINE

## 2018-08-13 PROCEDURE — 11000001 HC ACUTE MED/SURG PRIVATE ROOM

## 2018-08-13 PROCEDURE — 63600175 PHARM REV CODE 636 W HCPCS: Performed by: EMERGENCY MEDICINE

## 2018-08-13 PROCEDURE — 99233 SBSQ HOSP IP/OBS HIGH 50: CPT | Mod: ,,, | Performed by: INTERNAL MEDICINE

## 2018-08-13 PROCEDURE — 83735 ASSAY OF MAGNESIUM: CPT

## 2018-08-13 PROCEDURE — 25000003 PHARM REV CODE 250: Performed by: PHYSICIAN ASSISTANT

## 2018-08-13 PROCEDURE — 80048 BASIC METABOLIC PNL TOTAL CA: CPT

## 2018-08-13 PROCEDURE — 93010 ELECTROCARDIOGRAM REPORT: CPT | Mod: ,,, | Performed by: INTERNAL MEDICINE

## 2018-08-13 PROCEDURE — 85025 COMPLETE CBC W/AUTO DIFF WBC: CPT

## 2018-08-13 PROCEDURE — 84100 ASSAY OF PHOSPHORUS: CPT

## 2018-08-13 RX ORDER — SODIUM,POTASSIUM PHOSPHATES 280-250MG
2 POWDER IN PACKET (EA) ORAL
Status: COMPLETED | OUTPATIENT
Start: 2018-08-13 | End: 2018-08-13

## 2018-08-13 RX ORDER — OXYCODONE AND ACETAMINOPHEN 10; 325 MG/1; MG/1
1 TABLET ORAL EVERY 8 HOURS PRN
Status: DISCONTINUED | OUTPATIENT
Start: 2018-08-13 | End: 2018-08-14 | Stop reason: HOSPADM

## 2018-08-13 RX ADMIN — OXYCODONE HYDROCHLORIDE AND ACETAMINOPHEN 1 TABLET: 10; 325 TABLET ORAL at 08:08

## 2018-08-13 RX ADMIN — BENZONATATE 100 MG: 100 CAPSULE ORAL at 01:08

## 2018-08-13 RX ADMIN — AMLODIPINE BESYLATE 10 MG: 5 TABLET ORAL at 09:08

## 2018-08-13 RX ADMIN — POTASSIUM & SODIUM PHOSPHATES POWDER PACK 280-160-250 MG 2 PACKET: 280-160-250 PACK at 04:08

## 2018-08-13 RX ADMIN — INSULIN ASPART 2 UNITS: 100 INJECTION, SOLUTION INTRAVENOUS; SUBCUTANEOUS at 04:08

## 2018-08-13 RX ADMIN — CEFTRIAXONE 1 G: 1 INJECTION, SOLUTION INTRAVENOUS at 12:08

## 2018-08-13 RX ADMIN — POTASSIUM & SODIUM PHOSPHATES POWDER PACK 280-160-250 MG 2 PACKET: 280-160-250 PACK at 09:08

## 2018-08-13 RX ADMIN — ONDANSETRON 4 MG: 2 INJECTION, SOLUTION INTRAMUSCULAR; INTRAVENOUS at 08:08

## 2018-08-13 RX ADMIN — INSULIN DETEMIR 20 UNITS: 100 INJECTION, SOLUTION SUBCUTANEOUS at 09:08

## 2018-08-13 RX ADMIN — AZITHROMYCIN MONOHYDRATE 500 MG: 250 TABLET ORAL at 09:08

## 2018-08-13 RX ADMIN — ONDANSETRON 4 MG: 2 INJECTION, SOLUTION INTRAMUSCULAR; INTRAVENOUS at 01:08

## 2018-08-13 RX ADMIN — INSULIN ASPART 2 UNITS: 100 INJECTION, SOLUTION INTRAVENOUS; SUBCUTANEOUS at 09:08

## 2018-08-13 RX ADMIN — INSULIN ASPART 2 UNITS: 100 INJECTION, SOLUTION INTRAVENOUS; SUBCUTANEOUS at 11:08

## 2018-08-13 RX ADMIN — OXYCODONE HYDROCHLORIDE AND ACETAMINOPHEN 1 TABLET: 10; 325 TABLET ORAL at 12:08

## 2018-08-13 RX ADMIN — OXYCODONE HYDROCHLORIDE AND ACETAMINOPHEN 1 TABLET: 10; 325 TABLET ORAL at 04:08

## 2018-08-13 RX ADMIN — HYDROCHLOROTHIAZIDE 12.5 MG: 12.5 TABLET ORAL at 09:08

## 2018-08-13 RX ADMIN — ENALAPRIL MALEATE 20 MG: 20 TABLET ORAL at 09:08

## 2018-08-13 RX ADMIN — POTASSIUM & SODIUM PHOSPHATES POWDER PACK 280-160-250 MG 2 PACKET: 280-160-250 PACK at 11:08

## 2018-08-13 NOTE — ASSESSMENT & PLAN NOTE
Will start regular home dose of long acting insulin as patient diet advances as well as SSI  Adjust regimen based on blood glucose trends  Component      Latest Ref Rng & Units 8/12/2018 8/12/2018 8/12/2018 8/12/2018           7:35 PM  4:36 PM 12:35 PM  9:08 AM   POCT Glucose      70 - 110 mg/dL 192 (H) 244 (H) 201 (H) 169 (H)

## 2018-08-13 NOTE — SUBJECTIVE & OBJECTIVE
Interval History: Patient states that he is improved but still very weak.  Also reports that last night after taking XR oxycodone, he became diaphoretic and short of breath, but symptoms have since resolved.   Still weak, but agreed to discharge on tomorrow.     Review of Systems   Constitutional: Positive for appetite change. Negative for chills, diaphoresis, fatigue and fever.   Respiratory: Negative for shortness of breath and wheezing.    Cardiovascular: Negative for chest pain and leg swelling.   Gastrointestinal: Positive for abdominal pain. Negative for diarrhea, nausea and vomiting.   Genitourinary: Negative for difficulty urinating and dysuria.   Musculoskeletal: Positive for back pain (chronic).   Neurological: Negative for dizziness and speech difficulty.   Psychiatric/Behavioral: Negative for agitation and behavioral problems.     Objective:     Vital Signs (Most Recent):  Temp: 97.7 °F (36.5 °C) (08/13/18 1601)  Pulse: (!) 59 (08/13/18 1601)  Resp: 19 (08/13/18 1601)  BP: (!) 127/92 (08/13/18 1601)  SpO2: (!) 92 % (08/13/18 1601) Vital Signs (24h Range):  Temp:  [97.7 °F (36.5 °C)-99.7 °F (37.6 °C)] 97.7 °F (36.5 °C)  Pulse:  [58-73] 59  Resp:  [16-19] 19  SpO2:  [92 %-94 %] 92 %  BP: (115-138)/(71-95) 127/92     Weight: 86.2 kg (189 lb 16 oz)  Body mass index is 28.06 kg/m².    Intake/Output Summary (Last 24 hours) at 8/13/2018 1805  Last data filed at 8/13/2018 0500  Gross per 24 hour   Intake 360 ml   Output --   Net 360 ml      Physical Exam   Constitutional: He is oriented to person, place, and time. He appears well-developed and well-nourished.   HENT:   Head: Normocephalic and atraumatic.   Eyes: Conjunctivae and EOM are normal. Pupils are equal, round, and reactive to light.   Neck: Normal range of motion. Neck supple.   Cardiovascular: Normal rate and regular rhythm.   Pulmonary/Chest: Effort normal and breath sounds normal.   Abdominal: Soft. Bowel sounds are normal.   Musculoskeletal: Normal  range of motion.   Neurological: He is alert and oriented to person, place, and time.   Skin: Skin is warm and dry.   Psychiatric: He has a normal mood and affect. His behavior is normal.       Significant Labs:   CBC:   Recent Labs   Lab  08/12/18 0458 08/13/18 0440   WBC  13.31*  11.41   HGB  14.8  14.1   HCT  44.1  42.1   PLT  131*  135*     CMP:   Recent Labs   Lab  08/12/18 0458 08/13/18 0440   NA  141  139   K  4.0  3.6   CL  104  103   CO2  29  26   GLU  119*  110   BUN  18  15   CREATININE  1.2  1.0   CALCIUM  8.6*  8.4*   ANIONGAP  8  10   EGFRNONAA  >60  >60     All pertinent labs within the past 24 hours have been reviewed.    Significant Imaging: I have reviewed all pertinent imaging results/findings within the past 24 hours.

## 2018-08-13 NOTE — ASSESSMENT & PLAN NOTE
Continue Rocephin and Zithromax  WBC initially 18, down to 11, will monitor daily  Afebrile, monitor temp curve  Will discharge when clinically improved  CXR only significant for left lower lobe pneumonia  Anticipate discharge in AM

## 2018-08-13 NOTE — ASSESSMENT & PLAN NOTE
Will start regular home dose of long acting insulin as patient diet advances as well as SSI  Adjust regimen based on blood glucose trends  Controlled

## 2018-08-13 NOTE — NURSING
Patients VSS other than being luis at times and having a low grade temp.  Patient complaining of mid ABD pain, patient stated that he didn't want to take too much tylenol due to his HEP C.  I spoke with PA and an order was given for Oxy IR 10mg.  At approx. 0415 the patient complained of pain, chest tightness and was diaphoretic.  The tech obtained a CBG it was 91 and gave the patient orange juice.  The patient stated that he didn't like how the Oxy IR made him feel.  Called the PA again and changed his pain medication back to the original order.  The PA ordered and EKG, result was sinus luis.  I gave the patient his pain medication and he had relief and stated his pain was now 5/10 and no more chest tightness.   Will continue to monitor labs and vitals.

## 2018-08-13 NOTE — PROGRESS NOTES
[facility] Hospital Medicine  Progress Note    Patient Name: Emery Barragan  MRN: [id number]  Patient Class: IP- Inpatient   Admission Date: 8/11/2018  Length of Stay: 2 days  Attending Physician: Shona Portillo, *  [facility] Provider: [facility]        Subjective:     Principal Problem:Pneumonia of both lower lobes due to infectious organism    HPI:  56 yo male with a PMH of HTN, DM, Hep C successfully treated last year and COPD who presents to the ED c/o abdominal pain x 1 day.  Patient states abdominal pain is periumbilical and he associates it with nausea and vomiting and loose stools.  Patient reports that he had decreased dietary input since symptoms begin.  Denies fever, chills, chest pain, or shortness of breath.  Admits that he has a difficult time controlling pain due to chronic narcotic use for back pain. Reports that he quit smoking yesterday and does not drink alcohol due to diagnosis of hepatitis C.  CT of abdomin performed with no significant abdominal finding but significant for bilateral lower lobe pneumonia. Patient admitted for further eval and treatment.    Hospital Course:  Continued rocephin and zithromax for treatment of pneumonia.  Patient advanced to regular diet with no difficulty. Blood Cx NGTD, Repeat CXR only significant for left lower lobe pneumonia, WBC count has normalized.    Interval History: Patient states that he is improved but still very weak.  Also reports that last night after taking XR oxycodone, he became diaphoretic and short of breath, but symptoms have since resolved.   Still weak, but agreed to discharge on tomorrow.     Review of Systems   Constitutional: Positive for appetite change. Negative for chills, diaphoresis, fatigue and fever.   Respiratory: Negative for shortness of breath and wheezing.    Cardiovascular: Negative for chest pain and leg swelling.   Gastrointestinal: Positive for abdominal pain. Negative for diarrhea,  nausea and vomiting.   Genitourinary: Negative for difficulty urinating and dysuria.   Musculoskeletal: Positive for back pain (chronic).   Neurological: Negative for dizziness and speech difficulty.   Psychiatric/Behavioral: Negative for agitation and behavioral problems.     Objective:     Vital Signs (Most Recent):  Temp: 97.7 °F (36.5 °C) (08/13/18 1601)  Pulse: (!) 59 (08/13/18 1601)  Resp: 19 (08/13/18 1601)  BP: (!) 127/92 (08/13/18 1601)  SpO2: (!) 92 % (08/13/18 1601) Vital Signs (24h Range):  Temp:  [97.7 °F (36.5 °C)-99.7 °F (37.6 °C)] 97.7 °F (36.5 °C)  Pulse:  [58-73] 59  Resp:  [16-19] 19  SpO2:  [92 %-94 %] 92 %  BP: (115-138)/(71-95) 127/92     Weight: 86.2 kg (189 lb 16 oz)  Body mass index is 28.06 kg/m².    Intake/Output Summary (Last 24 hours) at 8/13/2018 1805  Last data filed at 8/13/2018 0500  Gross per 24 hour   Intake 360 ml   Output --   Net 360 ml      Physical Exam   Constitutional: He is oriented to person, place, and time. He appears well-developed and well-nourished.   HENT:   Head: Normocephalic and atraumatic.   Eyes: Conjunctivae and EOM are normal. Pupils are equal, round, and reactive to light.   Neck: Normal range of motion. Neck supple.   Cardiovascular: Normal rate and regular rhythm.   Pulmonary/Chest: Effort normal and breath sounds normal.   Abdominal: Soft. Bowel sounds are normal.   Musculoskeletal: Normal range of motion.   Neurological: He is alert and oriented to person, place, and time.   Skin: Skin is warm and dry.   Psychiatric: He has a normal mood and affect. His behavior is normal.       Significant Labs:   CBC:   Recent Labs   Lab  08/12/18   0458  08/13/18   0440   WBC  13.31*  11.41   HGB  14.8  14.1   HCT  44.1  42.1   PLT  131*  135*     CMP:   Recent Labs   Lab  08/12/18   0458  08/13/18   0440   NA  141  139   K  4.0  3.6   CL  104  103   CO2  29  26   GLU  119*  110   BUN  18  15   CREATININE  1.2  1.0   CALCIUM  8.6*  8.4*   ANIONGAP  8  10   EGFRNONAA   >60  >60     All pertinent labs within the past 24 hours have been reviewed.    Significant Imaging: I have reviewed all pertinent imaging results/findings within the past 24 hours.    Assessment/Plan:      * Pneumonia of both lower lobes due to infectious organism    Continue Rocephin and Zithromax  WBC initially 18, down to 11, will monitor daily  Afebrile, monitor temp curve  Will discharge when clinically improved  CXR only significant for left lower lobe pneumonia  Anticipate discharge in AM          Tobacco abuse    Nicotine patch          Essential hypertension    Restart antihypertensives  Adjust regimen based on blood pressure trends  Relatively controlled        Type 2 diabetes mellitus without complication    Will start regular home dose of long acting insulin as patient diet advances as well as SSI  Adjust regimen based on blood glucose trends  Controlled              VTE Risk Mitigation (From admission, onward)        Ordered     Place RAZIA hose  Until discontinued      08/11/18 1848     IP VTE LOW RISK PATIENT  Once      08/11/18 1559     Place sequential compression device  Until discontinued      08/11/18 1559              Shona Portillo MD  Department of Hospital Medicine   [facility]

## 2018-08-13 NOTE — SUBJECTIVE & OBJECTIVE
Interval History: Patient ambulating around room, states he feels much better.  Discussed discharge planning on tomorrow and patient in agreement.  Will advance diet to full liquids.     Review of Systems   Constitutional: Positive for appetite change. Negative for chills, diaphoresis, fatigue and fever.   Respiratory: Negative for shortness of breath and wheezing.    Cardiovascular: Negative for chest pain and leg swelling.   Gastrointestinal: Positive for abdominal pain. Negative for diarrhea, nausea and vomiting.   Genitourinary: Negative for difficulty urinating and dysuria.   Musculoskeletal: Positive for back pain.   Neurological: Negative for dizziness and speech difficulty.   Psychiatric/Behavioral: Negative for agitation and behavioral problems.     Objective:     Vital Signs (Most Recent):  Temp: 98.7 °F (37.1 °C) (08/12/18 1952)  Pulse: (!) 58 (08/12/18 1952)  Resp: 18 (08/12/18 1952)  BP: 129/81 (08/12/18 1952)  SpO2: (!) 93 % (08/12/18 1952) Vital Signs (24h Range):  Temp:  [98.7 °F (37.1 °C)-102.1 °F (38.9 °C)] 98.7 °F (37.1 °C)  Pulse:  [58-80] 58  Resp:  [16-20] 18  SpO2:  [92 %-97 %] 93 %  BP: (120-150)/(74-93) 129/81     Weight: 86.2 kg (189 lb 16 oz)  Body mass index is 28.06 kg/m².    Intake/Output Summary (Last 24 hours) at 8/12/2018 2145  Last data filed at 8/12/2018 0300  Gross per 24 hour   Intake 450 ml   Output --   Net 450 ml      Physical Exam   Constitutional: He is oriented to person, place, and time. He appears well-developed and well-nourished.   HENT:   Head: Normocephalic and atraumatic.   Eyes: Conjunctivae and EOM are normal. Pupils are equal, round, and reactive to light.   Neck: Normal range of motion. Neck supple.   Cardiovascular: Normal rate and regular rhythm.   Pulmonary/Chest: Effort normal and breath sounds normal.   Abdominal: Soft. Bowel sounds are normal.   Musculoskeletal: Normal range of motion.   Neurological: He is alert and oriented to person, place, and time.    Skin: Skin is warm and dry.   Psychiatric: He has a normal mood and affect. His behavior is normal.       Significant Labs:   CBC:   Recent Labs   Lab  08/11/18   0953  08/12/18   0458   WBC  18.27*  13.31*   HGB  16.6  14.8   HCT  48.4  44.1   PLT  142*  131*     CMP:   Recent Labs   Lab  08/11/18   0953  08/11/18   1412  08/12/18   0458   NA  141  139  141   K  4.5  3.7  4.0   CL  98  104  104   CO2  22*  23  29   GLU  257*  245*  119*   BUN  21*  21*  18   CREATININE  1.5*  1.2  1.2   CALCIUM  9.5  8.3*  8.6*   PROT  8.3   --    --    ALBUMIN  3.3*   --    --    BILITOT  0.5   --    --    ALKPHOS  95   --    --    AST  31   --    --    ALT  19   --    --    ANIONGAP  21*  12  8   EGFRNONAA  50*  >60  >60     All pertinent labs within the past 24 hours have been reviewed.    Significant Imaging: I have reviewed all pertinent imaging results/findings within the past 24 hours.

## 2018-08-13 NOTE — NURSING
Pt weaned to 92% on 1L via NC, SOB/dypsnea with exertion, coarse crackles to LLL, RML, RLL, diminished in upper lung fields, using incentive spirometer appropriately, productive cough with good effort, up to toilet ad vishal, denies dizziness, tolerating diet well, voiding spontaneously and adequately to toilet, complains of generalized ABD pain, well controlled with PO medication,bed in low, locked position, call light within reach, able to make needs known; no needs at this time.

## 2018-08-13 NOTE — ASSESSMENT & PLAN NOTE
Continue Rocephin and Zithromax  WBC initially 18, down to 13, will monitor daily  Afebrile, monitor temp curve  Will discharge when clinically improved  CXR in AM

## 2018-08-13 NOTE — PROGRESS NOTES
[facility] Hospital Medicine  Progress Note    Patient Name: Emery Barragan  MRN: [id number]  Patient Class: IP- Inpatient   Admission Date: 8/11/2018  Length of Stay: 1 days  Attending Physician: Shona Portillo, *  Primary Care Provider: [facility]        Subjective:     Principal Problem:Pneumonia of both lower lobes due to infectious organism    HPI:  56 yo male with a PMH of HTN, DM, Hep C successfully treated last year and COPD who presents to the ED c/o abdominal pain x 1 day.  Patient states abdominal pain is periumbilical and he associates it with nausea and vomiting and loose stools.  Patient reports that he had decreased dietary input since symptoms begin.  Denies fever, chills, chest pain, or shortness of breath.  Admits that he has a difficult time controlling pain due to chronic narcotic use for back pain. Reports that he quit smoking yesterday and does not drink alcohol due to diagnosis of hepatitis C.  CT of abdomin performed with no significant abdominal finding but significant for bilateral lower lobe pneumonia. Patient admitted for further eval and treatment.    Hospital Course:  No notes on file    Interval History: Patient ambulating around room, states he feels much better.  Discussed discharge planning on tomorrow and patient in agreement.  Will advance diet to full liquids.     Review of Systems   Constitutional: Positive for appetite change. Negative for chills, diaphoresis, fatigue and fever.   Respiratory: Negative for shortness of breath and wheezing.    Cardiovascular: Negative for chest pain and leg swelling.   Gastrointestinal: Positive for abdominal pain. Negative for diarrhea, nausea and vomiting.   Genitourinary: Negative for difficulty urinating and dysuria.   Musculoskeletal: Positive for back pain.   Neurological: Negative for dizziness and speech difficulty.   Psychiatric/Behavioral: Negative for agitation and behavioral problems.     Objective:      Vital Signs (Most Recent):  Temp: 98.7 °F (37.1 °C) (08/12/18 1952)  Pulse: (!) 58 (08/12/18 1952)  Resp: 18 (08/12/18 1952)  BP: 129/81 (08/12/18 1952)  SpO2: (!) 93 % (08/12/18 1952) Vital Signs (24h Range):  Temp:  [98.7 °F (37.1 °C)-102.1 °F (38.9 °C)] 98.7 °F (37.1 °C)  Pulse:  [58-80] 58  Resp:  [16-20] 18  SpO2:  [92 %-97 %] 93 %  BP: (120-150)/(74-93) 129/81     Weight: 86.2 kg (189 lb 16 oz)  Body mass index is 28.06 kg/m².    Intake/Output Summary (Last 24 hours) at 8/12/2018 2145  Last data filed at 8/12/2018 0300  Gross per 24 hour   Intake 450 ml   Output --   Net 450 ml      Physical Exam   Constitutional: He is oriented to person, place, and time. He appears well-developed and well-nourished.   HENT:   Head: Normocephalic and atraumatic.   Eyes: Conjunctivae and EOM are normal. Pupils are equal, round, and reactive to light.   Neck: Normal range of motion. Neck supple.   Cardiovascular: Normal rate and regular rhythm.   Pulmonary/Chest: Effort normal and breath sounds normal.   Abdominal: Soft. Bowel sounds are normal.   Musculoskeletal: Normal range of motion.   Neurological: He is alert and oriented to person, place, and time.   Skin: Skin is warm and dry.   Psychiatric: He has a normal mood and affect. His behavior is normal.       Significant Labs:   CBC:   Recent Labs   Lab  08/11/18   0953  08/12/18   0458   WBC  18.27*  13.31*   HGB  16.6  14.8   HCT  48.4  44.1   PLT  142*  131*     CMP:   Recent Labs   Lab  08/11/18   0953  08/11/18   1412  08/12/18   0458   NA  141  139  141   K  4.5  3.7  4.0   CL  98  104  104   CO2  22*  23  29   GLU  257*  245*  119*   BUN  21*  21*  18   CREATININE  1.5*  1.2  1.2   CALCIUM  9.5  8.3*  8.6*   PROT  8.3   --    --    ALBUMIN  3.3*   --    --    BILITOT  0.5   --    --    ALKPHOS  95   --    --    AST  31   --    --    ALT  19   --    --    ANIONGAP  21*  12  8   EGFRNONAA  50*  >60  >60     All pertinent labs within the past 24 hours have been  reviewed.    Significant Imaging: I have reviewed all pertinent imaging results/findings within the past 24 hours.    Assessment/Plan:      * Pneumonia of both lower lobes due to infectious organism    Continue Rocephin and Zithromax  WBC initially 18, down to 13, will monitor daily  Afebrile, monitor temp curve  Will discharge when clinically improved  CXR in AM          Tobacco abuse    Nicotine patch          Essential hypertension    Restart antihypertensives  Adjust regimen based on blood pressure trends        Type 2 diabetes mellitus without complication    Will start regular home dose of long acting insulin as patient diet advances as well as SSI  Adjust regimen based on blood glucose trends  Component      Latest Ref Rng & Units 8/12/2018 8/12/2018 8/12/2018 8/12/2018           7:35 PM  4:36 PM 12:35 PM  9:08 AM   POCT Glucose      70 - 110 mg/dL 192 (H) 244 (H) 201 (H) 169 (H)                 VTE Risk Mitigation (From admission, onward)        Ordered     Place RAZIA hose  Until discontinued      08/11/18 1848     IP VTE LOW RISK PATIENT  Once      08/11/18 1559     Place sequential compression device  Until discontinued      08/11/18 1559              Shona Portillo MD  Department of Hospital Medicine   [facility]

## 2018-08-13 NOTE — HOSPITAL COURSE
Patient was started on a course of Rocephin and azithromycin for treatment of pneumonia.  He was advanced to a regular diet with no difficulty. Blood Cx NGTD, Repeat CXR only significant for resolving left lower lobe pneumonia, WBC count normalized.  He was discharged home with abdominal pain improved.  He was encouraged to continue his efforts to abstain from smoking.  Follow up with PCP within the next 2 weeks to document resolution of pneumonia.

## 2018-08-14 VITALS
RESPIRATION RATE: 18 BRPM | WEIGHT: 190 LBS | HEART RATE: 63 BPM | OXYGEN SATURATION: 93 % | BODY MASS INDEX: 28.14 KG/M2 | DIASTOLIC BLOOD PRESSURE: 96 MMHG | SYSTOLIC BLOOD PRESSURE: 149 MMHG | HEIGHT: 69 IN | TEMPERATURE: 98 F

## 2018-08-14 LAB
ANION GAP SERPL CALC-SCNC: 10 MMOL/L
BASOPHILS # BLD AUTO: 0.01 K/UL
BASOPHILS NFR BLD: 0.1 %
BUN SERPL-MCNC: 14 MG/DL
CALCIUM SERPL-MCNC: 8.4 MG/DL
CHLORIDE SERPL-SCNC: 105 MMOL/L
CO2 SERPL-SCNC: 25 MMOL/L
CREAT SERPL-MCNC: 0.9 MG/DL
DIFFERENTIAL METHOD: ABNORMAL
EOSINOPHIL # BLD AUTO: 0.1 K/UL
EOSINOPHIL NFR BLD: 1.2 %
ERYTHROCYTE [DISTWIDTH] IN BLOOD BY AUTOMATED COUNT: 13.3 %
EST. GFR  (AFRICAN AMERICAN): >60 ML/MIN/1.73 M^2
EST. GFR  (NON AFRICAN AMERICAN): >60 ML/MIN/1.73 M^2
GLUCOSE SERPL-MCNC: 89 MG/DL
HCT VFR BLD AUTO: 41 %
HGB BLD-MCNC: 13.8 G/DL
LYMPHOCYTES # BLD AUTO: 1.8 K/UL
LYMPHOCYTES NFR BLD: 23.7 %
MAGNESIUM SERPL-MCNC: 2.1 MG/DL
MCH RBC QN AUTO: 28.3 PG
MCHC RBC AUTO-ENTMCNC: 33.7 G/DL
MCV RBC AUTO: 84 FL
MONOCYTES # BLD AUTO: 0.8 K/UL
MONOCYTES NFR BLD: 10.5 %
NEUTROPHILS # BLD AUTO: 4.9 K/UL
NEUTROPHILS NFR BLD: 64.2 %
PHOSPHATE SERPL-MCNC: 2.9 MG/DL
PLATELET # BLD AUTO: 149 K/UL
PMV BLD AUTO: 11.4 FL
POCT GLUCOSE: 157 MG/DL (ref 70–110)
POCT GLUCOSE: 98 MG/DL (ref 70–110)
POTASSIUM SERPL-SCNC: 3.1 MMOL/L
RBC # BLD AUTO: 4.88 M/UL
SODIUM SERPL-SCNC: 140 MMOL/L
WBC # BLD AUTO: 7.55 K/UL

## 2018-08-14 PROCEDURE — 85025 COMPLETE CBC W/AUTO DIFF WBC: CPT

## 2018-08-14 PROCEDURE — 36415 COLL VENOUS BLD VENIPUNCTURE: CPT

## 2018-08-14 PROCEDURE — 25000003 PHARM REV CODE 250: Performed by: INTERNAL MEDICINE

## 2018-08-14 PROCEDURE — 94761 N-INVAS EAR/PLS OXIMETRY MLT: CPT

## 2018-08-14 PROCEDURE — 27000221 HC OXYGEN, UP TO 24 HOURS

## 2018-08-14 PROCEDURE — 63600175 PHARM REV CODE 636 W HCPCS: Performed by: EMERGENCY MEDICINE

## 2018-08-14 PROCEDURE — 83735 ASSAY OF MAGNESIUM: CPT

## 2018-08-14 PROCEDURE — 99238 HOSP IP/OBS DSCHRG MGMT 30/<: CPT | Mod: ,,, | Performed by: HOSPITALIST

## 2018-08-14 PROCEDURE — 80048 BASIC METABOLIC PNL TOTAL CA: CPT

## 2018-08-14 PROCEDURE — 84100 ASSAY OF PHOSPHORUS: CPT

## 2018-08-14 PROCEDURE — 25000003 PHARM REV CODE 250: Performed by: PHYSICIAN ASSISTANT

## 2018-08-14 RX ORDER — AMOXICILLIN AND CLAVULANATE POTASSIUM 875; 125 MG/1; MG/1
1 TABLET, FILM COATED ORAL EVERY 12 HOURS
Qty: 14 TABLET | Refills: 0 | Status: SHIPPED | OUTPATIENT
Start: 2018-08-14 | End: 2018-08-21

## 2018-08-14 RX ADMIN — HYDROCHLOROTHIAZIDE 12.5 MG: 12.5 TABLET ORAL at 09:08

## 2018-08-14 RX ADMIN — ENALAPRIL MALEATE 20 MG: 20 TABLET ORAL at 09:08

## 2018-08-14 RX ADMIN — AZITHROMYCIN MONOHYDRATE 500 MG: 250 TABLET ORAL at 09:08

## 2018-08-14 RX ADMIN — OXYCODONE HYDROCHLORIDE AND ACETAMINOPHEN 1 TABLET: 10; 325 TABLET ORAL at 06:08

## 2018-08-14 RX ADMIN — INSULIN ASPART 2 UNITS: 100 INJECTION, SOLUTION INTRAVENOUS; SUBCUTANEOUS at 12:08

## 2018-08-14 RX ADMIN — BENZONATATE 100 MG: 100 CAPSULE ORAL at 09:08

## 2018-08-14 RX ADMIN — ONDANSETRON 4 MG: 2 INJECTION, SOLUTION INTRAMUSCULAR; INTRAVENOUS at 06:08

## 2018-08-14 RX ADMIN — AMLODIPINE BESYLATE 10 MG: 5 TABLET ORAL at 09:08

## 2018-08-14 NOTE — PLAN OF CARE
No needs from CM perspective.       08/14/18 1139   Final Note   Assessment Type Final Discharge Note   Discharge Disposition Home   What phone number can be called within the next 1-3 days to see how you are doing after discharge? [phone]   Hospital Follow Up  Appt(s) scheduled? Yes   Discharge plans and expectations educations in teach back method with documentation complete? Yes   Right Care Referral Info   Post Acute Recommendation No Care

## 2018-08-14 NOTE — NURSING
Patient has been bradycardic and slight BP elevation.  Patient continues to complain of pain and nausea.  Medicated patient with moderate relief.  IV site changed in Left FA.  Patients diet advanced 08/13 and patient is tolerating diet.  Purposeful rounding completed, bed in low position, and call light in reach.

## 2018-08-14 NOTE — PLAN OF CARE
Problem: Patient Care Overview  Goal: Plan of Care Review  Outcome: Ongoing (interventions implemented as appropriate)  Pt on 0.5L NC. Sats 96%. No distress noted. Will continue to monitor.

## 2018-08-14 NOTE — NURSING
1252 Pt 93% on RA, fine crackles to lung bases, tolerating diet, complains of abd pain, states that it is tolerable, voiding spontaneously and adequately, up ad vishal, removed IV, reviewed discharge instructions; pt verbalized understanding.    1350 Pt wife is downstairs; pt ok to ambulate downstairs (per OC, Francia Medina RN) to meet wife and to pharmacy to  prescription.

## 2018-08-14 NOTE — PLAN OF CARE
Pt states he has a ride home.    Pt and family prefer DC medications to go to [facility].    No DC needs from CM perspective.       08/14/18 1045   Discharge Reassessment   Assessment Type Discharge Planning Reassessment   Provided patient/caregiver education on the expected discharge date and the discharge plan Yes   Do you have any problems affording any of your prescribed medications? No   Discharge Plan A Home with family   Discharge Plan B Home   Patient choice form signed by patient/caregiver N/A   Can the patient answer the patient profile reliably? Yes, cognitively intact   How does the patient rate their overall health at the present time? Good   Describe the patient's ability to walk at the present time. No restrictions   How often would a person be available to care for the patient? Whenever needed   During the past month, has the patient often been bothered by feeling down, depressed or hopeless? No   During the past month, has the patient often been bothered by little interest or pleasure in doing things? No

## 2018-08-15 NOTE — DISCHARGE SUMMARY
[facility] Hospital Medicine  Discharge Summary      Patient Name: Emery Barragan  MRN: [id number]  Admission Date: 8/11/2018  Hospital Length of Stay: 3 days  Discharge Date and Time: 8/14/2018  1:52 PM  Attending Physician: No att. providers found   Discharging Provider: Cris Ervin MD  Primary Care Provider: [facility]      HPI:   56 yo male with a PMH of HTN, DM, Hep C successfully treated last year with Harvoni and COPD who presents to the ED c/o periumbilical abdominal pain, vomiting and loose stools for 1 day.  Patient reports that he had decreased oral intake since symptoms begin.  Denies fever, chills, chest pain, or shortness of breath.  Admits that he has a difficult time controlling pain due to chronic narcotic use for back pain. Reports that he quit smoking yesterday and does not drink alcohol due to diagnosis of hepatitis C.  CT of abdomin was performed with no significant abdominal finding but was significant for bilateral lower lobe pneumonia. Patient admitted for further eval and treatment.          Hospital Course:   Patient was started on a course of Rocephin and azithromycin for treatment of pneumonia.  He was advanced to a regular diet with no difficulty. Blood Cx NGTD, Repeat CXR only significant for resolving left lower lobe pneumonia, WBC count normalized.  He was discharged home with abdominal pain improved.  He was encouraged to continue his efforts to abstain from smoking.  Follow up with PCP within the next 2 weeks to document resolution of pneumonia.       Final Active Diagnoses:    Diagnosis Date Noted POA    PRINCIPAL PROBLEM:  Pneumonia of both lower lobes due to infectious organism [J18.1] 08/11/2018 Yes    Tobacco abuse [Z72.0] 08/11/2018 Yes    Type 2 diabetes mellitus without complication [E11.9] 04/25/2016 Yes    Essential hypertension [I10] 04/25/2016 Yes      Problems Resolved During this Admission:       Discharged Condition:  stable    Disposition: Home or Self Care    Follow Up:  Follow-up Information     [facility].    Specialties:  Nursing Home Agency, SNF Agency  Why:  Follow up in 1-2 weeks  Contact information:  [facility] [address] [phone]                 Patient Instructions:      Diet Adult Regular   Order Comments: Diabetic 1800 kCal     Order Specific Question Answer Comments   Na restriction, if any: 2gNa      Activity as tolerated       Medications:  Reconciled Home Medications:      Medication List      START taking these medications    amoxicillin-clavulanate 875-125mg 875-125 mg per tablet  Commonly known as:  AUGMENTIN  Take 1 tablet by mouth every 12 (twelve) hours for 7 days        CONTINUE taking these medications    amLODIPine 10 MG tablet  Commonly known as:  NORVASC  Take 10 mg by mouth once daily.     BD INSULIN SYRINGE ULTRA-FINE 0.5 mL 31 gauge x 5/16 Syrg  Generic drug:  insulin syringe-needle U-100  INJECT 3 TIMES A DAY AS DIRECTED     enalapril 20 MG tablet  Commonly known as:  VASOTEC  Take 20 mg by mouth once daily.     hydroCHLOROthiazide 12.5 mg capsule  Commonly known as:  MICROZIDE  Take 12.5 mg by mouth once daily.     insulin aspart U-100 100 unit/mL injection  Commonly known as:  NOVOLOG  Inject 5 Units into the skin 3 (three) times daily before meals.     insulin glargine 100 unit/mL injection  Commonly known as:  LANTUS  Inject 30 Units into the skin every evening.     ondansetron 4 MG tablet  Commonly known as:  ZOFRAN  Take 1 tablet (4 mg total) by mouth every 6 (six) hours as needed for Nausea.     ONETOUCH ULTRA TEST Strp  Generic drug:  blood sugar diagnostic  TEST TWICE DAILY.            Time spent on the discharge of patient: <30 minutes  Patient was seen and examined on the date of discharge and determined to be suitable for discharge.         Cris Ervin MD  Department of Hospital Medicine  [facility]

## 2018-08-16 LAB
BACTERIA BLD CULT: NORMAL
BACTERIA BLD CULT: NORMAL

## 2019-08-06 ENCOUNTER — HOSPITAL ENCOUNTER (INPATIENT)
Facility: OTHER | Age: 59
LOS: 3 days | Discharge: HOME | DRG: 392 | End: 2019-08-10
Attending: EMERGENCY MEDICINE | Admitting: INTERNAL MEDICINE
Payer: MEDICARE

## 2019-08-06 DIAGNOSIS — I10 BP (HIGH BLOOD PRESSURE): ICD-10-CM

## 2019-08-06 DIAGNOSIS — R10.9 ABDOMINAL PAIN: ICD-10-CM

## 2019-08-06 DIAGNOSIS — K20.80 ESOPHAGITIS, LOS ANGELES GRADE D: Primary | ICD-10-CM

## 2019-08-06 DIAGNOSIS — R11.2 INTRACTABLE VOMITING WITH NAUSEA, UNSPECIFIED VOMITING TYPE: ICD-10-CM

## 2019-08-06 LAB
ALBUMIN SERPL BCP-MCNC: 4.2 G/DL (ref 3.5–5.2)
ALP SERPL-CCNC: 84 U/L (ref 55–135)
ALT SERPL W/O P-5'-P-CCNC: 17 U/L (ref 10–44)
AMPHET+METHAMPHET UR QL: NEGATIVE
ANION GAP SERPL CALC-SCNC: 14 MMOL/L (ref 8–16)
AST SERPL-CCNC: 28 U/L (ref 10–40)
BACTERIA #/AREA URNS HPF: ABNORMAL /HPF
BARBITURATES UR QL SCN>200 NG/ML: NEGATIVE
BASOPHILS # BLD AUTO: 0.03 K/UL (ref 0–0.2)
BASOPHILS NFR BLD: 0.2 % (ref 0–1.9)
BENZODIAZ UR QL SCN>200 NG/ML: NEGATIVE
BILIRUB SERPL-MCNC: 0.5 MG/DL (ref 0.1–1)
BILIRUB UR QL STRIP: NEGATIVE
BUN SERPL-MCNC: 18 MG/DL (ref 6–20)
BZE UR QL SCN: NEGATIVE
CALCIUM SERPL-MCNC: 9.9 MG/DL (ref 8.7–10.5)
CANNABINOIDS UR QL SCN: NORMAL
CHLORIDE SERPL-SCNC: 108 MMOL/L (ref 95–110)
CLARITY UR: CLEAR
CO2 SERPL-SCNC: 19 MMOL/L (ref 23–29)
COLOR UR: YELLOW
CREAT SERPL-MCNC: 1.3 MG/DL (ref 0.5–1.4)
CREAT UR-MCNC: 116 MG/DL (ref 23–375)
DIFFERENTIAL METHOD: NORMAL
EOSINOPHIL # BLD AUTO: 0.1 K/UL (ref 0–0.5)
EOSINOPHIL NFR BLD: 0.9 % (ref 0–8)
ERYTHROCYTE [DISTWIDTH] IN BLOOD BY AUTOMATED COUNT: 13 % (ref 11.5–14.5)
EST. GFR  (AFRICAN AMERICAN): >60 ML/MIN/1.73 M^2
EST. GFR  (NON AFRICAN AMERICAN): 59 ML/MIN/1.73 M^2
ETHANOL UR-MCNC: <10 MG/DL
GLUCOSE SERPL-MCNC: 146 MG/DL (ref 70–110)
GLUCOSE UR QL STRIP: NEGATIVE
HCT VFR BLD AUTO: 48.6 % (ref 40–54)
HGB BLD-MCNC: 16.9 G/DL (ref 14–18)
HGB UR QL STRIP: ABNORMAL
HYALINE CASTS #/AREA URNS LPF: 1 /LPF
IMM GRANULOCYTES # BLD AUTO: 0.03 K/UL (ref 0–0.04)
IMM GRANULOCYTES NFR BLD AUTO: 0.2 % (ref 0–0.5)
KETONES UR QL STRIP: ABNORMAL
LEUKOCYTE ESTERASE UR QL STRIP: NEGATIVE
LIPASE SERPL-CCNC: 73 U/L (ref 4–60)
LYMPHOCYTES # BLD AUTO: 4.4 K/UL (ref 1–4.8)
LYMPHOCYTES NFR BLD: 34.6 % (ref 18–48)
MAGNESIUM SERPL-MCNC: 2.4 MG/DL (ref 1.6–2.6)
MCH RBC QN AUTO: 28.7 PG (ref 27–31)
MCHC RBC AUTO-ENTMCNC: 34.8 G/DL (ref 32–36)
MCV RBC AUTO: 83 FL (ref 82–98)
METHADONE UR QL SCN>300 NG/ML: NEGATIVE
MICROSCOPIC COMMENT: ABNORMAL
MONOCYTES # BLD AUTO: 0.9 K/UL (ref 0.3–1)
MONOCYTES NFR BLD: 7.3 % (ref 4–15)
NEUTROPHILS # BLD AUTO: 7.2 K/UL (ref 1.8–7.7)
NEUTROPHILS NFR BLD: 56.8 % (ref 38–73)
NITRITE UR QL STRIP: NEGATIVE
NRBC BLD-RTO: 0 /100 WBC
OPIATES UR QL SCN: NORMAL
PCP UR QL SCN>25 NG/ML: NEGATIVE
PH UR STRIP: 8 [PH] (ref 5–8)
PHOSPHATE SERPL-MCNC: 2.3 MG/DL (ref 2.7–4.5)
PLATELET # BLD AUTO: 164 K/UL (ref 150–350)
PMV BLD AUTO: 11.8 FL (ref 9.2–12.9)
POCT GLUCOSE: 149 MG/DL (ref 70–110)
POTASSIUM SERPL-SCNC: 4.2 MMOL/L (ref 3.5–5.1)
PROT SERPL-MCNC: 7.5 G/DL (ref 6–8.4)
PROT UR QL STRIP: ABNORMAL
RBC # BLD AUTO: 5.89 M/UL (ref 4.6–6.2)
RBC #/AREA URNS HPF: 10 /HPF (ref 0–4)
SODIUM SERPL-SCNC: 141 MMOL/L (ref 136–145)
SP GR UR STRIP: 1.02 (ref 1–1.03)
SQUAMOUS #/AREA URNS HPF: 1 /HPF
TOXICOLOGY INFORMATION: NORMAL
URN SPEC COLLECT METH UR: ABNORMAL
UROBILINOGEN UR STRIP-ACNC: NEGATIVE EU/DL
WBC # BLD AUTO: 12.59 K/UL (ref 3.9–12.7)
WBC #/AREA URNS HPF: 6 /HPF (ref 0–5)

## 2019-08-06 PROCEDURE — G0378 HOSPITAL OBSERVATION PER HR: HCPCS

## 2019-08-06 PROCEDURE — 43752 NASAL/OROGASTRIC W/TUBE PLMT: CPT

## 2019-08-06 PROCEDURE — 25000003 PHARM REV CODE 250: Performed by: PHYSICIAN ASSISTANT

## 2019-08-06 PROCEDURE — 80307 DRUG TEST PRSMV CHEM ANLYZR: CPT

## 2019-08-06 PROCEDURE — 83690 ASSAY OF LIPASE: CPT

## 2019-08-06 PROCEDURE — 99223 1ST HOSP IP/OBS HIGH 75: CPT | Mod: ,,, | Performed by: PHYSICIAN ASSISTANT

## 2019-08-06 PROCEDURE — 85025 COMPLETE CBC W/AUTO DIFF WBC: CPT

## 2019-08-06 PROCEDURE — 96365 THER/PROPH/DIAG IV INF INIT: CPT

## 2019-08-06 PROCEDURE — 99285 EMERGENCY DEPT VISIT HI MDM: CPT | Mod: 25

## 2019-08-06 PROCEDURE — 83735 ASSAY OF MAGNESIUM: CPT

## 2019-08-06 PROCEDURE — 63600175 PHARM REV CODE 636 W HCPCS: Performed by: EMERGENCY MEDICINE

## 2019-08-06 PROCEDURE — 25500020 PHARM REV CODE 255: Performed by: EMERGENCY MEDICINE

## 2019-08-06 PROCEDURE — 96361 HYDRATE IV INFUSION ADD-ON: CPT

## 2019-08-06 PROCEDURE — 96375 TX/PRO/DX INJ NEW DRUG ADDON: CPT

## 2019-08-06 PROCEDURE — 94761 N-INVAS EAR/PLS OXIMETRY MLT: CPT

## 2019-08-06 PROCEDURE — 84100 ASSAY OF PHOSPHORUS: CPT

## 2019-08-06 PROCEDURE — 81000 URINALYSIS NONAUTO W/SCOPE: CPT

## 2019-08-06 PROCEDURE — 80053 COMPREHEN METABOLIC PANEL: CPT

## 2019-08-06 PROCEDURE — 99223 PR INITIAL HOSPITAL CARE,LEVL III: ICD-10-PCS | Mod: ,,, | Performed by: PHYSICIAN ASSISTANT

## 2019-08-06 PROCEDURE — 63600175 PHARM REV CODE 636 W HCPCS: Performed by: PHYSICIAN ASSISTANT

## 2019-08-06 RX ORDER — GLUCAGON 1 MG
1 KIT INJECTION
Status: DISCONTINUED | OUTPATIENT
Start: 2019-08-06 | End: 2019-08-10 | Stop reason: HOSPADM

## 2019-08-06 RX ORDER — SODIUM CHLORIDE 0.9 % (FLUSH) 0.9 %
10 SYRINGE (ML) INJECTION
Status: DISCONTINUED | OUTPATIENT
Start: 2019-08-06 | End: 2019-08-10 | Stop reason: HOSPADM

## 2019-08-06 RX ORDER — INSULIN ASPART 100 [IU]/ML
0-5 INJECTION, SOLUTION INTRAVENOUS; SUBCUTANEOUS
Status: DISCONTINUED | OUTPATIENT
Start: 2019-08-06 | End: 2019-08-10 | Stop reason: HOSPADM

## 2019-08-06 RX ORDER — SODIUM CHLORIDE 450 MG/100ML
INJECTION, SOLUTION INTRAVENOUS CONTINUOUS
Status: DISCONTINUED | OUTPATIENT
Start: 2019-08-06 | End: 2019-08-10 | Stop reason: HOSPADM

## 2019-08-06 RX ORDER — ONDANSETRON 2 MG/ML
4 INJECTION INTRAMUSCULAR; INTRAVENOUS
Status: COMPLETED | OUTPATIENT
Start: 2019-08-06 | End: 2019-08-06

## 2019-08-06 RX ORDER — IBUPROFEN 200 MG
24 TABLET ORAL
Status: DISCONTINUED | OUTPATIENT
Start: 2019-08-06 | End: 2019-08-10 | Stop reason: HOSPADM

## 2019-08-06 RX ORDER — AMLODIPINE BESYLATE 5 MG/1
10 TABLET ORAL DAILY
Status: DISCONTINUED | OUTPATIENT
Start: 2019-08-06 | End: 2019-08-10 | Stop reason: HOSPADM

## 2019-08-06 RX ORDER — DICYCLOMINE HYDROCHLORIDE 10 MG/1
20 CAPSULE ORAL 4 TIMES DAILY PRN
Status: DISCONTINUED | OUTPATIENT
Start: 2019-08-06 | End: 2019-08-07

## 2019-08-06 RX ORDER — HYDROMORPHONE HYDROCHLORIDE 1 MG/ML
1 INJECTION, SOLUTION INTRAMUSCULAR; INTRAVENOUS; SUBCUTANEOUS
Status: COMPLETED | OUTPATIENT
Start: 2019-08-06 | End: 2019-08-06

## 2019-08-06 RX ORDER — ONDANSETRON 2 MG/ML
4 INJECTION INTRAMUSCULAR; INTRAVENOUS EVERY 6 HOURS PRN
Status: DISCONTINUED | OUTPATIENT
Start: 2019-08-06 | End: 2019-08-10 | Stop reason: HOSPADM

## 2019-08-06 RX ORDER — IBUPROFEN 200 MG
16 TABLET ORAL
Status: DISCONTINUED | OUTPATIENT
Start: 2019-08-06 | End: 2019-08-10 | Stop reason: HOSPADM

## 2019-08-06 RX ORDER — ENALAPRIL MALEATE 5 MG/1
20 TABLET ORAL DAILY
Status: DISCONTINUED | OUTPATIENT
Start: 2019-08-06 | End: 2019-08-08

## 2019-08-06 RX ORDER — PROMETHAZINE HYDROCHLORIDE 25 MG/ML
25 INJECTION, SOLUTION INTRAMUSCULAR; INTRAVENOUS
Status: DISCONTINUED | OUTPATIENT
Start: 2019-08-06 | End: 2019-08-06

## 2019-08-06 RX ORDER — ONDANSETRON 2 MG/ML
INJECTION INTRAMUSCULAR; INTRAVENOUS
Status: DISPENSED
Start: 2019-08-06 | End: 2019-08-06

## 2019-08-06 RX ORDER — MORPHINE SULFATE 4 MG/ML
4 INJECTION, SOLUTION INTRAMUSCULAR; INTRAVENOUS
Status: COMPLETED | OUTPATIENT
Start: 2019-08-06 | End: 2019-08-06

## 2019-08-06 RX ADMIN — MORPHINE SULFATE 4 MG: 4 INJECTION INTRAVENOUS at 08:08

## 2019-08-06 RX ADMIN — SODIUM CHLORIDE 1000 ML: 0.9 INJECTION, SOLUTION INTRAVENOUS at 08:08

## 2019-08-06 RX ADMIN — ONDANSETRON 4 MG: 2 INJECTION INTRAMUSCULAR; INTRAVENOUS at 08:08

## 2019-08-06 RX ADMIN — ONDANSETRON 4 MG: 2 INJECTION INTRAMUSCULAR; INTRAVENOUS at 06:08

## 2019-08-06 RX ADMIN — DICYCLOMINE HYDROCHLORIDE 20 MG: 10 CAPSULE ORAL at 04:08

## 2019-08-06 RX ADMIN — SODIUM CHLORIDE: 0.45 INJECTION, SOLUTION INTRAVENOUS at 02:08

## 2019-08-06 RX ADMIN — FOLIC ACID: 5 INJECTION, SOLUTION INTRAMUSCULAR; INTRAVENOUS; SUBCUTANEOUS at 02:08

## 2019-08-06 RX ADMIN — AMLODIPINE BESYLATE 10 MG: 5 TABLET ORAL at 02:08

## 2019-08-06 RX ADMIN — ENALAPRIL MALEATE 20 MG: 5 TABLET ORAL at 02:08

## 2019-08-06 RX ADMIN — IOHEXOL 100 ML: 350 INJECTION, SOLUTION INTRAVENOUS at 10:08

## 2019-08-06 RX ADMIN — HYDROMORPHONE HYDROCHLORIDE 1 MG: 1 INJECTION, SOLUTION INTRAMUSCULAR; INTRAVENOUS; SUBCUTANEOUS at 10:08

## 2019-08-06 RX ADMIN — DICYCLOMINE HYDROCHLORIDE 20 MG: 10 CAPSULE ORAL at 09:08

## 2019-08-06 RX ADMIN — SODIUM CHLORIDE: 0.45 INJECTION, SOLUTION INTRAVENOUS at 08:08

## 2019-08-06 RX ADMIN — PROMETHAZINE HYDROCHLORIDE 12.5 MG: 25 INJECTION INTRAMUSCULAR; INTRAVENOUS at 09:08

## 2019-08-06 NOTE — ED NOTES
Pt returned from CT, reporting 3/10 abd pain at this time. NG tube remains at 55cm at the right nare. Pt admits feeling better, no active vomiting. VSS, pt currently afebrile.

## 2019-08-06 NOTE — ED NOTES
"Pt to ED c/o ABD pain x onset this AM with nausea, vomiting. Pt in mild distress related to pain. Pt screaming out, "lord alicias help me, the pain is too much." Otherwise, Pt AAOx4 and appropriate at this time. Respirations even and unlabored. No acute distress noted.  Awaiting further orders. Pt updated on POC. Bed is locked and in lowest position with side rails up x2. Call bell within reach and pt oriented to use of call bell. Pt placed on continuous cardiac monitoring, continuous pulse ox, and continuous BP cuff. Will continue to monitor.     "

## 2019-08-06 NOTE — ASSESSMENT & PLAN NOTE
- CT A/P no evidence of obstruction, remove NGT  - tox screen presum pos for marijuana and opiods, although patient denies, states he only uses occasionally; per EMR hx of opiod withdrawal, used street meds to avoid, currently denies  - possible gastroenteritis vs cannabis induced; will also monitor for possible opiod w/d  - IVF's  - antiemetics  - clears advance as tolerated

## 2019-08-06 NOTE — ED NOTES
Will remove NG tube per YANCY Wilson. Currently at bedside discussing plan of care and admission with pt and family

## 2019-08-06 NOTE — H&P
[facility] Medicine  History & Physical    Patient Name: Emery Barragan  MRN: [id number]  Admission Date: 8/6/2019  Attending Physician: Blas Galvin MD   Primary Care Provider: [facility]         Patient information was obtained from patient, spouse/SO, past medical records and ER records.     Subjective:     Principal Problem:Intractable vomiting with nausea    Chief Complaint:   Chief Complaint   Patient presents with    Emesis     Pt actively vomiting in triage. Pt c/o emesis with generalized abdominal pain.        HPI: 58 year old male with PMH of HTN, DM, Hep C successfully treated and COPD presented to ED with c/o abdominal pain that began this morning. Stated abdominal pain is periumbilical and associated with nausea and vomiting. No hematemesis. Stated that he had similar symptoms in the past, and was told that he had a bowel obstruction. Last BM was yesterday. Reported stopped drinking alcohol secondary to Hep C diagnosis. Denies any fever, chills, sore throat, chest pain, CP, shortness of breath, diarrhea, and dysuria.  Conitinues to smoke a pack per day, denies illicit drug use. NGT was placed in ED d/t continued vomiting. ED eval afebrile, no WBC, electrolytes unremarkable, lipase mildly elevated. CT A/P no findings to suggest bowel obstruction. Tox screen presumptive pos for marijuana and opiates. Admitted to Observation.            Past Medical History:   Diagnosis Date    Chronic back pain     Diabetes mellitus     Gallstones     Glaucoma (increased eye pressure)     Hepatitis C     Hypertension        Past Surgical History:   Procedure Laterality Date    CHOLECYSTECTOMY      COLONOSCOPY N/A 4/27/2016    Performed by [facility] ENDO    POLYPECTOMY N/A 4/27/2016    Performed by [facility] at [facility] ENDO       Review of patient's allergies indicates:  No Known Allergies    No current facility-administered medications on file  prior to encounter.      Current Outpatient Medications on File Prior to Encounter   Medication Sig    amlodipine (NORVASC) 10 MG tablet Take 10 mg by mouth once daily.    BD INSULIN SYRINGE ULTRA-FINE 0.5 mL 31 gauge x 5/16 Syrg INJECT 3 TIMES A DAY AS DIRECTED    enalapril (VASOTEC) 20 MG tablet Take 20 mg by mouth once daily.    hydrochlorothiazide (MICROZIDE) 12.5 mg capsule Take 12.5 mg by mouth once daily.    insulin aspart (NOVOLOG) 100 unit/mL injection Inject 5 Units into the skin 3 (three) times daily before meals.    insulin glargine (LANTUS) 100 unit/mL injection Inject 30 Units into the skin every evening.    ondansetron (ZOFRAN) 4 MG tablet Take 1 tablet (4 mg total) by mouth every 6 (six) hours as needed for Nausea.    ONETOUCH ULTRA TEST Strp TEST TWICE DAILY.     Family History     None        Tobacco Use    Smoking status: Current Every Day Smoker     Packs/day: 1.00     Types: Cigarettes    Smokeless tobacco: Never Used   Substance and Sexual Activity    Alcohol use: No    Drug use: No    Sexual activity: Not on file     Review of Systems   Constitutional: Positive for appetite change. Negative for chills and fever.   HENT: Negative for congestion and sore throat.    Eyes: Negative.    Respiratory: Negative for chest tightness and shortness of breath.    Cardiovascular: Negative for chest pain and leg swelling.   Gastrointestinal: Positive for abdominal pain, nausea and vomiting. Negative for abdominal distention, blood in stool, constipation and diarrhea.   Endocrine: Negative.    Genitourinary: Negative for difficulty urinating and dysuria.   Musculoskeletal: Positive for back pain (chronic). Negative for arthralgias and neck pain.   Skin: Negative.    Neurological: Negative for dizziness, syncope, light-headedness and headaches.     Objective:     Vital Signs (Most Recent):  Temp: 98.3 °F (36.8 °C) (08/06/19 1051)  Pulse: (!) 56 (08/06/19 1348)  Resp: 16 (08/06/19 1348)  BP: (!)  167/99 (08/06/19 1348)  SpO2: 97 % (08/06/19 1348) Vital Signs (24h Range):  Temp:  [97.9 °F (36.6 °C)-98.3 °F (36.8 °C)] 98.3 °F (36.8 °C)  Pulse:  [50-86] 56  Resp:  [16-20] 16  SpO2:  [94 %-97 %] 97 %  BP: (150-180)/() 167/99     Weight: 85.6 kg (188 lb 11.4 oz)  Body mass index is 27.87 kg/m².    Physical Exam   Constitutional: He is oriented to person, place, and time. He appears well-developed and well-nourished. No distress.   HENT:   Head: Normocephalic and atraumatic.   Mouth/Throat: Oropharynx is clear and moist.   NGT in place   Eyes: Pupils are equal, round, and reactive to light. Conjunctivae are normal. No scleral icterus.   Neck: Normal range of motion. Neck supple.   Cardiovascular: Intact distal pulses.   No murmur heard.  bradycardic   Pulmonary/Chest: Effort normal and breath sounds normal. No respiratory distress. He has no wheezes.   Abdominal: Soft. Bowel sounds are normal. He exhibits no distension. There is tenderness (mild periumbilical TTP). There is no rebound and no guarding.   Musculoskeletal: Normal range of motion. He exhibits no edema or tenderness.   Neurological: He is alert and oriented to person, place, and time.   Skin: Skin is warm and dry. Capillary refill takes less than 2 seconds. He is not diaphoretic.   Psychiatric: He has a normal mood and affect.   Nursing note and vitals reviewed.        CRANIAL NERVES     CN III, IV, VI   Pupils are equal, round, and reactive to light.       Significant Labs:   CBC:   Recent Labs   Lab 08/06/19  0851   WBC 12.59   HGB 16.9   HCT 48.6        CMP:   Recent Labs   Lab 08/06/19  0851      K 4.2      CO2 19*   *   BUN 18   CREATININE 1.3   CALCIUM 9.9   PROT 7.5   ALBUMIN 4.2   BILITOT 0.5   ALKPHOS 84   AST 28   ALT 17   ANIONGAP 14   EGFRNONAA 59*       All pertinent labs within the past 24 hours have been reviewed.    Significant Imaging: I have reviewed all pertinent imaging results/findings within the  past 24 hours.   Imaging Results          CT Abdomen Pelvis With Contrast (Final result)  Result time 08/06/19 10:49:33    Final result by Patrick Mayberry MD (08/06/19 10:49:33)                 Impression:      NG tube with the tip within the proximal stomach.  Note that no dilated loops of bowel are evident.  There are no findings to suggest bowel obstruction.    Small hiatal hernia.    S/p cholecystectomy.    Bilateral renal cysts.    Bilateral renal adrenal gland hyperplasia, stable.      Electronically signed by: Patrick Mayberry MD  Date:    08/06/2019  Time:    10:49             Narrative:    EXAMINATION:  CT ABDOMEN PELVIS WITH CONTRAST    CLINICAL HISTORY:  Bowel obstruction, high-grade;    TECHNIQUE:  Low dose axial images, sagittal and coronal reformations were obtained from the lung bases to the pubic symphysis following the IV administration of 100 mL of Omnipaque 350 .  Oral contrast was not given.    COMPARISON:  08/11/2018.    FINDINGS:  There are mild dependent atelectatic changes within the lung bases.  There is a calcified granuloma within the right lower lobe posteriorly.  The bones appear intact.  There is no evidence for acute fracture or bone destruction.  The liver is normal in size and is homogeneous in density with no focal abnormalities of the liver identified.  Patient is s/p cholecystectomy.  There is prominence of the extrahepatic common bile duct, however this is similar to the prior examination and is likely related to the patient's post cholecystectomy state.  There is a small hiatal hernia.  There is an enteric tube with the tip within the proximal stomach.  The remainder of the stomach appears grossly unremarkable.  The spleen and pancreas both appear grossly unremarkable.  There is bilateral adrenal gland thickening, similar to the prior examination.  There are bilateral renal hypodensities present with the largest located within the upper pole of the left kidney most consistent with  bilateral renal cysts.  There is no evidence for hydronephrosis.  The abdominal aorta tapers normally without aneurysmal dilatation.  No para-aortic lymphadenopathy is identified.  The appendix is present and appears grossly unremarkable.  There are no dilated loops of bowel evident.  No ascites is identified.  The prostate gland does appear prominent in size.  The urinary bladder is unremarkable.  There is no evidence for pelvic or inguinal lymphadenopathy.                               X-Ray Abdomen AP 1 View (KUB) (Final result)  Result time 08/06/19 09:38:49    Final result by Severino Holden MD (08/06/19 09:38:49)                 Impression:      As above      Electronically signed by: Severino Holden MD  Date:    08/06/2019  Time:    09:38             Narrative:    EXAMINATION:  XR ABDOMEN AP 1 VIEW    CLINICAL HISTORY:  Unspecified abdominal pain    TECHNIQUE:  Single AP View of the abdomen was performed.    COMPARISON:  08/11/2018    FINDINGS:  NG tube in place.  However the tip in proximal port or above the level of diaphragm.  This should be advanced on the order of 10 cm.    Cholecystectomy clips in right upper quadrant.    Bowel gas pattern appears nonobstructive.  No dilated loops of bowel identified.  No obvious free air.                                  Assessment/Plan:     * Intractable vomiting with nausea  - CT A/P no evidence of obstruction, remove NGT  - tox screen presum pos for marijuana and opiods, although patient denies, states he only uses occasionally; per EMR hx of opiod withdrawal, used street meds to avoid, currently denies  - possible gastroenteritis vs cannabis induced; will also monitor for possible opiod w/d  - IVF's  - antiemetics  - clears advance as tolerated      Tobacco abuse  - counseled on cessation      Essential hypertension  - elevated on admission, did not take morning med   - resume home med: enalapril 20 mg   - monitor      Type 2 diabetes mellitus, with long-term  current use of insulin  - on glargine and novolog at home  - SSI/accuchecks  - check A1c  - monitor      VTE Risk Mitigation (From admission, onward)        Ordered     Place sequential compression device  Until discontinued      08/06/19 1338     IP VTE LOW RISK PATIENT  Once      08/06/19 1338     Place sequential compression device  Until discontinued      08/06/19 1338             Sandra Wilson PA-C  Department of Hospital Medicine   [facility]

## 2019-08-06 NOTE — PLAN OF CARE
Problem: Adult Inpatient Plan of Care  Goal: Plan of Care Review  Outcome: Ongoing (interventions implemented as appropriate)  Pt in bed asleep at moment, easily aroused to touch. VSS. NADN. Pt complaints of pain to abdomen. PRN medication given. No complaints of nausea/vomiting noted. MVI infusing via IV pt tolerating well.  Pt updated on POC and verbalized understanding. Call bell placed in reach of pt. Comfort and safety needs addressed. Condition  stable.

## 2019-08-06 NOTE — ASSESSMENT & PLAN NOTE
- elevated on admission, did not take morning med   - resume home med: enalapril 20 mg   - monitor

## 2019-08-06 NOTE — SUBJECTIVE & OBJECTIVE
Past Medical History:   Diagnosis Date    Chronic back pain     Diabetes mellitus     Gallstones     Glaucoma (increased eye pressure)     Hepatitis C     Hypertension        Past Surgical History:   Procedure Laterality Date    CHOLECYSTECTOMY      COLONOSCOPY N/A 4/27/2016    Performed by [facility] ENDO    POLYPECTOMY N/A 4/27/2016    Performed by Andre [facility] at [facility] ENDO       Review of patient's allergies indicates:  No Known Allergies    No current facility-administered medications on file prior to encounter.      Current Outpatient Medications on File Prior to Encounter   Medication Sig    amlodipine (NORVASC) 10 MG tablet Take 10 mg by mouth once daily.    BD INSULIN SYRINGE ULTRA-FINE 0.5 mL 31 gauge x 5/16 Syrg INJECT 3 TIMES A DAY AS DIRECTED    enalapril (VASOTEC) 20 MG tablet Take 20 mg by mouth once daily.    hydrochlorothiazide (MICROZIDE) 12.5 mg capsule Take 12.5 mg by mouth once daily.    insulin aspart (NOVOLOG) 100 unit/mL injection Inject 5 Units into the skin 3 (three) times daily before meals.    insulin glargine (LANTUS) 100 unit/mL injection Inject 30 Units into the skin every evening.    ondansetron (ZOFRAN) 4 MG tablet Take 1 tablet (4 mg total) by mouth every 6 (six) hours as needed for Nausea.    ONETOUCH ULTRA TEST Strp TEST TWICE DAILY.     Family History     None        Tobacco Use    Smoking status: Current Every Day Smoker     Packs/day: 1.00     Types: Cigarettes    Smokeless tobacco: Never Used   Substance and Sexual Activity    Alcohol use: No    Drug use: No    Sexual activity: Not on file     Review of Systems   Constitutional: Positive for appetite change. Negative for chills and fever.   HENT: Negative for congestion and sore throat.    Eyes: Negative.    Respiratory: Negative for chest tightness and shortness of breath.    Cardiovascular: Negative for chest pain and leg swelling.   Gastrointestinal: Positive for abdominal  pain, nausea and vomiting. Negative for abdominal distention, blood in stool, constipation and diarrhea.   Endocrine: Negative.    Genitourinary: Negative for difficulty urinating and dysuria.   Musculoskeletal: Positive for back pain (chronic). Negative for arthralgias and neck pain.   Skin: Negative.    Neurological: Negative for dizziness, syncope, light-headedness and headaches.     Objective:     Vital Signs (Most Recent):  Temp: 98.3 °F (36.8 °C) (08/06/19 1051)  Pulse: (!) 56 (08/06/19 1348)  Resp: 16 (08/06/19 1348)  BP: (!) 167/99 (08/06/19 1348)  SpO2: 97 % (08/06/19 1348) Vital Signs (24h Range):  Temp:  [97.9 °F (36.6 °C)-98.3 °F (36.8 °C)] 98.3 °F (36.8 °C)  Pulse:  [50-86] 56  Resp:  [16-20] 16  SpO2:  [94 %-97 %] 97 %  BP: (150-180)/() 167/99     Weight: 85.6 kg (188 lb 11.4 oz)  Body mass index is 27.87 kg/m².    Physical Exam   Constitutional: He is oriented to person, place, and time. He appears well-developed and well-nourished. No distress.   HENT:   Head: Normocephalic and atraumatic.   Mouth/Throat: Oropharynx is clear and moist.   NGT in place   Eyes: Pupils are equal, round, and reactive to light. Conjunctivae are normal. No scleral icterus.   Neck: Normal range of motion. Neck supple.   Cardiovascular: Intact distal pulses.   No murmur heard.  bradycardic   Pulmonary/Chest: Effort normal and breath sounds normal. No respiratory distress. He has no wheezes.   Abdominal: Soft. Bowel sounds are normal. He exhibits no distension. There is tenderness (mild periumbilical TTP). There is no rebound and no guarding.   Musculoskeletal: Normal range of motion. He exhibits no edema or tenderness.   Neurological: He is alert and oriented to person, place, and time.   Skin: Skin is warm and dry. Capillary refill takes less than 2 seconds. He is not diaphoretic.   Psychiatric: He has a normal mood and affect.   Nursing note and vitals reviewed.        CRANIAL NERVES     CN III, IV, VI   Pupils are  equal, round, and reactive to light.       Significant Labs:   CBC:   Recent Labs   Lab 08/06/19  0851   WBC 12.59   HGB 16.9   HCT 48.6        CMP:   Recent Labs   Lab 08/06/19  0851      K 4.2      CO2 19*   *   BUN 18   CREATININE 1.3   CALCIUM 9.9   PROT 7.5   ALBUMIN 4.2   BILITOT 0.5   ALKPHOS 84   AST 28   ALT 17   ANIONGAP 14   EGFRNONAA 59*       All pertinent labs within the past 24 hours have been reviewed.    Significant Imaging: I have reviewed all pertinent imaging results/findings within the past 24 hours.   Imaging Results          CT Abdomen Pelvis With Contrast (Final result)  Result time 08/06/19 10:49:33    Final result by Patrick Mayberry MD (08/06/19 10:49:33)                 Impression:      NG tube with the tip within the proximal stomach.  Note that no dilated loops of bowel are evident.  There are no findings to suggest bowel obstruction.    Small hiatal hernia.    S/p cholecystectomy.    Bilateral renal cysts.    Bilateral renal adrenal gland hyperplasia, stable.      Electronically signed by: Patrick Mayberry MD  Date:    08/06/2019  Time:    10:49             Narrative:    EXAMINATION:  CT ABDOMEN PELVIS WITH CONTRAST    CLINICAL HISTORY:  Bowel obstruction, high-grade;    TECHNIQUE:  Low dose axial images, sagittal and coronal reformations were obtained from the lung bases to the pubic symphysis following the IV administration of 100 mL of Omnipaque 350 .  Oral contrast was not given.    COMPARISON:  08/11/2018.    FINDINGS:  There are mild dependent atelectatic changes within the lung bases.  There is a calcified granuloma within the right lower lobe posteriorly.  The bones appear intact.  There is no evidence for acute fracture or bone destruction.  The liver is normal in size and is homogeneous in density with no focal abnormalities of the liver identified.  Patient is s/p cholecystectomy.  There is prominence of the extrahepatic common bile duct, however this  is similar to the prior examination and is likely related to the patient's post cholecystectomy state.  There is a small hiatal hernia.  There is an enteric tube with the tip within the proximal stomach.  The remainder of the stomach appears grossly unremarkable.  The spleen and pancreas both appear grossly unremarkable.  There is bilateral adrenal gland thickening, similar to the prior examination.  There are bilateral renal hypodensities present with the largest located within the upper pole of the left kidney most consistent with bilateral renal cysts.  There is no evidence for hydronephrosis.  The abdominal aorta tapers normally without aneurysmal dilatation.  No para-aortic lymphadenopathy is identified.  The appendix is present and appears grossly unremarkable.  There are no dilated loops of bowel evident.  No ascites is identified.  The prostate gland does appear prominent in size.  The urinary bladder is unremarkable.  There is no evidence for pelvic or inguinal lymphadenopathy.                               X-Ray Abdomen AP 1 View (KUB) (Final result)  Result time 08/06/19 09:38:49    Final result by Severino Holden MD (08/06/19 09:38:49)                 Impression:      As above      Electronically signed by: Severino Holden MD  Date:    08/06/2019  Time:    09:38             Narrative:    EXAMINATION:  XR ABDOMEN AP 1 VIEW    CLINICAL HISTORY:  Unspecified abdominal pain    TECHNIQUE:  Single AP View of the abdomen was performed.    COMPARISON:  08/11/2018    FINDINGS:  NG tube in place.  However the tip in proximal port or above the level of diaphragm.  This should be advanced on the order of 10 cm.    Cholecystectomy clips in right upper quadrant.    Bowel gas pattern appears nonobstructive.  No dilated loops of bowel identified.  No obvious free air.

## 2019-08-06 NOTE — ED PROVIDER NOTES
Encounter Date: 8/6/2019    SCRIBE #1 NOTE: Rocky TAYLOR, am scribing for, and in the presence of, Dr. Khan .       History     Chief Complaint   Patient presents with    Emesis     Pt actively vomiting in triage. Pt c/o emesis with generalized abdominal pain.     Time seen by provider: 8:33 AM    This is a 58 y.o. male with a history of HTN, DM, and Hepatitis C who presents with complaint of abdominal pain that began this morning. The patient is also experiencing nausea and vomiting. The patient states that he had similar symptoms in the past, and was told that he had a bowel obstruction. He states that his vomit is yellow and tastes like his bowels. He reports that he has been able pt pass gas. He denies fever, sore throat, chest pain, shortness of breath, diarrhea, and dysuria.     The history is provided by the patient.     Review of patient's allergies indicates:  No Known Allergies  Past Medical History:   Diagnosis Date    Chronic back pain     Diabetes mellitus     Gallstones     Glaucoma (increased eye pressure)     Hepatitis C     Hypertension      Past Surgical History:   Procedure Laterality Date    CHOLECYSTECTOMY      COLONOSCOPY N/A 4/27/2016    Performed by [facility] at [facility]    POLYPECTOMY N/A 4/27/2016    Performed by Andre [facility] MD at [facility] END[facility]     History reviewed. No pertinent family history.  Social History     Tobacco Use    Smoking status: Current Every Day Smoker     Packs/day: 1.00     Types: Cigarettes    Smokeless tobacco: Never Used   Substance Use Topics    Alcohol use: No    Drug use: No     Review of Systems   Constitutional: Negative for fever.   HENT: Negative for sore throat.    Respiratory: Negative for shortness of breath.    Cardiovascular: Negative for chest pain.   Gastrointestinal: Positive for abdominal pain, nausea and vomiting. Negative for diarrhea.   Genitourinary: Negative for dysuria.   Musculoskeletal: Negative for back  pain.   Skin: Negative for rash.   Neurological: Negative for weakness.   Hematological: Does not bruise/bleed easily.   All other systems reviewed and are negative.      Physical Exam     Initial Vitals [08/06/19 0824]   BP Pulse Resp Temp SpO2   (!) 180/111 86 20 97.9 °F (36.6 °C) 96 %      MAP       --         Physical Exam    Nursing note and vitals reviewed.  Constitutional: He appears well-developed and well-nourished. He is not diaphoretic. No distress.   HENT:   Head: Normocephalic and atraumatic.   Right Ear: External ear normal.   Left Ear: External ear normal.   Nose: Nose normal.   Eyes: Conjunctivae and EOM are normal. Pupils are equal, round, and reactive to light.   Neck: Normal range of motion. Neck supple. No tracheal deviation present. No JVD present.   Cardiovascular: Normal rate, regular rhythm, normal heart sounds and intact distal pulses. Exam reveals no gallop and no friction rub.    No murmur heard.  Pulmonary/Chest: Breath sounds normal. No respiratory distress. He has no wheezes. He has no rhonchi. He has no rales. He exhibits no tenderness.   Abdominal: Soft. He exhibits distension. He exhibits no mass. There is no tenderness. There is no rebound and no guarding.   Decreased bowel sounds.    Musculoskeletal: Normal range of motion. He exhibits no edema or tenderness.   Neurological: He is alert and oriented to person, place, and time. He has normal strength. He displays normal reflexes. No cranial nerve deficit or sensory deficit.   Skin: Skin is warm and dry. No rash noted. No erythema.   Psychiatric: He has a normal mood and affect. His behavior is normal. Judgment and thought content normal.         ED Course   Procedures  Labs Reviewed   COMPREHENSIVE METABOLIC PANEL - Abnormal; Notable for the following components:       Result Value    CO2 19 (*)     Glucose 146 (*)     eGFR if non  59 (*)     All other components within normal limits   LIPASE - Abnormal; Notable for  the following components:    Lipase 73 (*)     All other components within normal limits   URINALYSIS, REFLEX TO URINE CULTURE - Abnormal; Notable for the following components:    Protein, UA 3+ (*)     Ketones, UA Trace (*)     Occult Blood UA 1+ (*)     All other components within normal limits    Narrative:     Preferred Collection Type->Urine, Clean Catch   URINALYSIS MICROSCOPIC - Abnormal; Notable for the following components:    RBC, UA 10 (*)     WBC, UA 6 (*)     All other components within normal limits    Narrative:     Preferred Collection Type->Urine, Clean Catch   CBC W/ AUTO DIFFERENTIAL   TOXICOLOGY SCREEN, URINE, RANDOM (COMPLIANCE)          Imaging Results          CT Abdomen Pelvis With Contrast (Final result)  Result time 08/06/19 10:49:33    Final result by Patrick Mayberry MD (08/06/19 10:49:33)                 Impression:      NG tube with the tip within the proximal stomach.  Note that no dilated loops of bowel are evident.  There are no findings to suggest bowel obstruction.    Small hiatal hernia.    S/p cholecystectomy.    Bilateral renal cysts.    Bilateral renal adrenal gland hyperplasia, stable.      Electronically signed by: Patrick Mayberry MD  Date:    08/06/2019  Time:    10:49             Narrative:    EXAMINATION:  CT ABDOMEN PELVIS WITH CONTRAST    CLINICAL HISTORY:  Bowel obstruction, high-grade;    TECHNIQUE:  Low dose axial images, sagittal and coronal reformations were obtained from the lung bases to the pubic symphysis following the IV administration of 100 mL of Omnipaque 350 .  Oral contrast was not given.    COMPARISON:  08/11/2018.    FINDINGS:  There are mild dependent atelectatic changes within the lung bases.  There is a calcified granuloma within the right lower lobe posteriorly.  The bones appear intact.  There is no evidence for acute fracture or bone destruction.  The liver is normal in size and is homogeneous in density with no focal abnormalities of the liver  identified.  Patient is s/p cholecystectomy.  There is prominence of the extrahepatic common bile duct, however this is similar to the prior examination and is likely related to the patient's post cholecystectomy state.  There is a small hiatal hernia.  There is an enteric tube with the tip within the proximal stomach.  The remainder of the stomach appears grossly unremarkable.  The spleen and pancreas both appear grossly unremarkable.  There is bilateral adrenal gland thickening, similar to the prior examination.  There are bilateral renal hypodensities present with the largest located within the upper pole of the left kidney most consistent with bilateral renal cysts.  There is no evidence for hydronephrosis.  The abdominal aorta tapers normally without aneurysmal dilatation.  No para-aortic lymphadenopathy is identified.  The appendix is present and appears grossly unremarkable.  There are no dilated loops of bowel evident.  No ascites is identified.  The prostate gland does appear prominent in size.  The urinary bladder is unremarkable.  There is no evidence for pelvic or inguinal lymphadenopathy.                               X-Ray Abdomen AP 1 View (KUB) (Final result)  Result time 08/06/19 09:38:49    Final result by Severino Holden MD (08/06/19 09:38:49)                 Impression:      As above      Electronically signed by: Severino Holden MD  Date:    08/06/2019  Time:    09:38             Narrative:    EXAMINATION:  XR ABDOMEN AP 1 VIEW    CLINICAL HISTORY:  Unspecified abdominal pain    TECHNIQUE:  Single AP View of the abdomen was performed.    COMPARISON:  08/11/2018    FINDINGS:  NG tube in place.  However the tip in proximal port or above the level of diaphragm.  This should be advanced on the order of 10 cm.    Cholecystectomy clips in right upper quadrant.    Bowel gas pattern appears nonobstructive.  No dilated loops of bowel identified.  No obvious free air.                              X-Rays:    Independently Interpreted Readings:   Abdomen: No air fluid levels or signs of obstruction. Paucity of air. No masses. NG tube above the fundus of the stomac      Medical Decision Making:   History:   Old Medical Records: I decided to obtain old medical records.  Old Records Summarized: records from clinic visits and records from previous admission(s).       <> Summary of Records: In April 2016 the patient was seen for intractable vomiting and nausea. He was diagnosed wih partial bowel obstruction.  Subsequent to that patient had different admissions for similar symptoms, but these were believed to be due to narcotic withdrawal.  Differential Diagnosis:   Acute pyelonephritis, ureterolithiais, AAA rupture / dissection, GERD, intestinal spasm, GERD, intestinal spasm, gastroenteritis, gastritis, ulcer, cholecystitis, gallstones, pancreatitis, ileus, small bowel obstruction, appendicitis, constipation, intestinal gas pain, gastritis, gastroenteritis, pancreatitis, testicular torsion, epididymitis, intrabominal hemorrhage, intrabominal thrombus    Independently Interpreted Test(s):   I have ordered and independently interpreted X-rays - see prior notes.  Clinical Tests:   Lab Tests: Ordered and Reviewed  Radiological Study: Ordered and Reviewed  ED Management:  Discussed with patient the previous admissions for narcotic withdrawal.  Patient reports that he has not had any or used any narcotics in several months.            Scribe Attestation:   Scribe #1: I performed the above scribed service and the documentation accurately describes the services I performed. I attest to the accuracy of the note.    Attending Attestation:           Physician Attestation for Scribe:  Physician Attestation Statement for Scribe #1: I, Dr. Khan, reviewed documentation, as scribed by Rocky Sow  in my presence, and it is both accurate and complete.                 ED Course as of Aug 07 0702   Tue Aug 06, 2019   0859 Patient  continues to vomit despite nausea medications and pain medication, will place NGT    [MA]   4549 NGT placement noted from xray read. Will advance. Reassessment on Ct.     [MA]   0860 Discussed the case with Dr. Mendez, who will admit the patient to observation, under Dr. Cunningham.     [MM]      ED Course User Index  [MA] Melvin Khan MD  [MM] Rocky Sow     Clinical Impression:     1. Intractable vomiting with nausea, unspecified vomiting type    2. Abdominal pain          Disposition:   Disposition: Admitted  Condition: Stable                        Melvin Khan MD  08/07/19 0704

## 2019-08-06 NOTE — ED NOTES
Pt on call light, pt actively vomiting. MD notified. Verbal orders for NG tube insertion at this time.

## 2019-08-06 NOTE — HPI
58 year old male with PMH of HTN, DM, Hep C successfully treated and COPD presented to ED with c/o abdominal pain that began this morning. Stated abdominal pain is periumbilical and associated with nausea and vomiting. No hematemesis. Stated that he had similar symptoms in the past, and was told that he had a bowel obstruction. Last BM was yesterday. Reported stopped drinking alcohol secondary to Hep C diagnosis. Denies any fever, chills, sore throat, chest pain, CP, shortness of breath, diarrhea, and dysuria. Conitinues to smoke a pack per day, denies illicit drug use. NGT was placed in ED d/t continued vomiting. ED eval afebrile, no WBC, electrolytes unremarkable, lipase mildly elevated. CT A/P no findings to suggest bowel obstruction. Tox screen presumptive pos for marijuana and opiates. Admitted to Observation.

## 2019-08-07 LAB
ANION GAP SERPL CALC-SCNC: 14 MMOL/L (ref 8–16)
BASOPHILS # BLD AUTO: 0.04 K/UL (ref 0–0.2)
BASOPHILS NFR BLD: 0.3 % (ref 0–1.9)
BUN SERPL-MCNC: 11 MG/DL (ref 6–20)
CALCIUM SERPL-MCNC: 9.6 MG/DL (ref 8.7–10.5)
CHLORIDE SERPL-SCNC: 108 MMOL/L (ref 95–110)
CO2 SERPL-SCNC: 18 MMOL/L (ref 23–29)
CREAT SERPL-MCNC: 1.2 MG/DL (ref 0.5–1.4)
DIFFERENTIAL METHOD: ABNORMAL
EOSINOPHIL # BLD AUTO: 0 K/UL (ref 0–0.5)
EOSINOPHIL NFR BLD: 0.2 % (ref 0–8)
ERYTHROCYTE [DISTWIDTH] IN BLOOD BY AUTOMATED COUNT: 12.9 % (ref 11.5–14.5)
EST. GFR  (AFRICAN AMERICAN): >60 ML/MIN/1.73 M^2
EST. GFR  (NON AFRICAN AMERICAN): >60 ML/MIN/1.73 M^2
ESTIMATED AVG GLUCOSE: 157 MG/DL (ref 68–131)
GLUCOSE SERPL-MCNC: 161 MG/DL (ref 70–110)
HBA1C MFR BLD HPLC: 7.1 % (ref 4–5.6)
HCT VFR BLD AUTO: 48.6 % (ref 40–54)
HGB BLD-MCNC: 16.4 G/DL (ref 14–18)
IMM GRANULOCYTES # BLD AUTO: 0.08 K/UL (ref 0–0.04)
IMM GRANULOCYTES NFR BLD AUTO: 0.5 % (ref 0–0.5)
LYMPHOCYTES # BLD AUTO: 2.7 K/UL (ref 1–4.8)
LYMPHOCYTES NFR BLD: 18.1 % (ref 18–48)
MCH RBC QN AUTO: 27.8 PG (ref 27–31)
MCHC RBC AUTO-ENTMCNC: 33.7 G/DL (ref 32–36)
MCV RBC AUTO: 83 FL (ref 82–98)
MONOCYTES # BLD AUTO: 0.9 K/UL (ref 0.3–1)
MONOCYTES NFR BLD: 5.9 % (ref 4–15)
NEUTROPHILS # BLD AUTO: 11.2 K/UL (ref 1.8–7.7)
NEUTROPHILS NFR BLD: 75 % (ref 38–73)
NRBC BLD-RTO: 0 /100 WBC
PLATELET # BLD AUTO: 173 K/UL (ref 150–350)
PMV BLD AUTO: 11.7 FL (ref 9.2–12.9)
POCT GLUCOSE: 101 MG/DL (ref 70–110)
POCT GLUCOSE: 209 MG/DL (ref 70–110)
POCT GLUCOSE: 222 MG/DL (ref 70–110)
POCT GLUCOSE: 246 MG/DL (ref 70–110)
POCT GLUCOSE: 65 MG/DL (ref 70–110)
POTASSIUM SERPL-SCNC: 3.3 MMOL/L (ref 3.5–5.1)
RBC # BLD AUTO: 5.89 M/UL (ref 4.6–6.2)
SODIUM SERPL-SCNC: 140 MMOL/L (ref 136–145)
WBC # BLD AUTO: 14.96 K/UL (ref 3.9–12.7)

## 2019-08-07 PROCEDURE — 63600175 PHARM REV CODE 636 W HCPCS: Performed by: PHYSICIAN ASSISTANT

## 2019-08-07 PROCEDURE — 85025 COMPLETE CBC W/AUTO DIFF WBC: CPT

## 2019-08-07 PROCEDURE — 25000003 PHARM REV CODE 250: Performed by: PHYSICIAN ASSISTANT

## 2019-08-07 PROCEDURE — 80048 BASIC METABOLIC PNL TOTAL CA: CPT

## 2019-08-07 PROCEDURE — 36415 COLL VENOUS BLD VENIPUNCTURE: CPT

## 2019-08-07 PROCEDURE — 99233 PR SUBSEQUENT HOSPITAL CARE,LEVL III: ICD-10-PCS | Mod: ,,, | Performed by: PHYSICIAN ASSISTANT

## 2019-08-07 PROCEDURE — 83036 HEMOGLOBIN GLYCOSYLATED A1C: CPT

## 2019-08-07 PROCEDURE — 94761 N-INVAS EAR/PLS OXIMETRY MLT: CPT

## 2019-08-07 PROCEDURE — 99233 SBSQ HOSP IP/OBS HIGH 50: CPT | Mod: ,,, | Performed by: PHYSICIAN ASSISTANT

## 2019-08-07 PROCEDURE — 11000001 HC ACUTE MED/SURG PRIVATE ROOM

## 2019-08-07 RX ORDER — KETOROLAC TROMETHAMINE 30 MG/ML
15 INJECTION, SOLUTION INTRAMUSCULAR; INTRAVENOUS ONCE
Status: COMPLETED | OUTPATIENT
Start: 2019-08-07 | End: 2019-08-07

## 2019-08-07 RX ORDER — ONDANSETRON 2 MG/ML
4 INJECTION INTRAMUSCULAR; INTRAVENOUS ONCE
Status: DISCONTINUED | OUTPATIENT
Start: 2019-08-07 | End: 2019-08-07

## 2019-08-07 RX ORDER — DIPHENHYDRAMINE HCL 25 MG
25 CAPSULE ORAL EVERY 6 HOURS PRN
Status: DISCONTINUED | OUTPATIENT
Start: 2019-08-07 | End: 2019-08-10 | Stop reason: HOSPADM

## 2019-08-07 RX ORDER — DIPHENHYDRAMINE HYDROCHLORIDE 50 MG/ML
25 INJECTION INTRAMUSCULAR; INTRAVENOUS ONCE
Status: COMPLETED | OUTPATIENT
Start: 2019-08-07 | End: 2019-08-07

## 2019-08-07 RX ORDER — LATANOPROST 50 UG/ML
1 SOLUTION/ DROPS OPHTHALMIC NIGHTLY
COMMUNITY
End: 2022-04-05

## 2019-08-07 RX ORDER — DICYCLOMINE HYDROCHLORIDE 10 MG/ML
20 INJECTION INTRAMUSCULAR 4 TIMES DAILY
Status: DISCONTINUED | OUTPATIENT
Start: 2019-08-07 | End: 2019-08-09

## 2019-08-07 RX ORDER — CALCIUM CARBONATE 200(500)MG
2 TABLET,CHEWABLE ORAL DAILY
COMMUNITY
End: 2022-04-05

## 2019-08-07 RX ORDER — DICYCLOMINE HYDROCHLORIDE 10 MG/ML
20 INJECTION INTRAMUSCULAR 4 TIMES DAILY
Status: DISCONTINUED | OUTPATIENT
Start: 2019-08-07 | End: 2019-08-07

## 2019-08-07 RX ORDER — HYDRALAZINE HYDROCHLORIDE 25 MG/1
25 TABLET, FILM COATED ORAL ONCE
Status: COMPLETED | OUTPATIENT
Start: 2019-08-07 | End: 2019-08-07

## 2019-08-07 RX ORDER — HYDRALAZINE HYDROCHLORIDE 25 MG/1
25 TABLET, FILM COATED ORAL EVERY 8 HOURS PRN
Status: DISCONTINUED | OUTPATIENT
Start: 2019-08-07 | End: 2019-08-10 | Stop reason: HOSPADM

## 2019-08-07 RX ORDER — POTASSIUM CHLORIDE 7.45 MG/ML
10 INJECTION INTRAVENOUS
Status: COMPLETED | OUTPATIENT
Start: 2019-08-07 | End: 2019-08-07

## 2019-08-07 RX ORDER — CLONIDINE 0.2 MG/24H
1 PATCH, EXTENDED RELEASE TRANSDERMAL ONCE
Status: DISCONTINUED | OUTPATIENT
Start: 2019-08-07 | End: 2019-08-10 | Stop reason: HOSPADM

## 2019-08-07 RX ORDER — METHOCARBAMOL 500 MG/1
500 TABLET, FILM COATED ORAL 4 TIMES DAILY
Status: DISCONTINUED | OUTPATIENT
Start: 2019-08-07 | End: 2019-08-10 | Stop reason: HOSPADM

## 2019-08-07 RX ORDER — METOCLOPRAMIDE HYDROCHLORIDE 5 MG/ML
10 INJECTION INTRAMUSCULAR; INTRAVENOUS ONCE
Status: COMPLETED | OUTPATIENT
Start: 2019-08-07 | End: 2019-08-07

## 2019-08-07 RX ORDER — OMEPRAZOLE 40 MG/1
40 CAPSULE, DELAYED RELEASE ORAL DAILY
Status: ON HOLD | COMMUNITY
End: 2019-08-09 | Stop reason: HOSPADM

## 2019-08-07 RX ORDER — PROCHLORPERAZINE EDISYLATE 5 MG/ML
10 INJECTION INTRAMUSCULAR; INTRAVENOUS ONCE
Status: COMPLETED | OUTPATIENT
Start: 2019-08-07 | End: 2019-08-07

## 2019-08-07 RX ORDER — INSULIN LISPRO 100 [IU]/ML
INJECTION, SOLUTION INTRAVENOUS; SUBCUTANEOUS
COMMUNITY
End: 2022-04-05 | Stop reason: SDUPTHER

## 2019-08-07 RX ORDER — ONDANSETRON 2 MG/ML
8 INJECTION INTRAMUSCULAR; INTRAVENOUS ONCE
Status: COMPLETED | OUTPATIENT
Start: 2019-08-07 | End: 2019-08-07

## 2019-08-07 RX ADMIN — PROCHLORPERAZINE EDISYLATE 10 MG: 5 INJECTION INTRAMUSCULAR; INTRAVENOUS at 06:08

## 2019-08-07 RX ADMIN — POTASSIUM CHLORIDE 10 MEQ: 10 INJECTION, SOLUTION INTRAVENOUS at 10:08

## 2019-08-07 RX ADMIN — ONDANSETRON 8 MG: 2 INJECTION INTRAMUSCULAR; INTRAVENOUS at 04:08

## 2019-08-07 RX ADMIN — DICYCLOMINE HYDROCHLORIDE 20 MG: 20 INJECTION, SOLUTION INTRAMUSCULAR at 08:08

## 2019-08-07 RX ADMIN — DICYCLOMINE HYDROCHLORIDE 20 MG: 10 CAPSULE ORAL at 03:08

## 2019-08-07 RX ADMIN — METHOCARBAMOL 500 MG: 500 TABLET, FILM COATED ORAL at 04:08

## 2019-08-07 RX ADMIN — METHOCARBAMOL 500 MG: 500 TABLET, FILM COATED ORAL at 12:08

## 2019-08-07 RX ADMIN — DIPHENHYDRAMINE HYDROCHLORIDE 25 MG: 50 INJECTION, SOLUTION INTRAMUSCULAR; INTRAVENOUS at 06:08

## 2019-08-07 RX ADMIN — METOCLOPRAMIDE 10 MG: 5 INJECTION, SOLUTION INTRAMUSCULAR; INTRAVENOUS at 03:08

## 2019-08-07 RX ADMIN — METHOCARBAMOL 500 MG: 500 TABLET, FILM COATED ORAL at 08:08

## 2019-08-07 RX ADMIN — CLONIDINE 1 PATCH: 0.2 PATCH TRANSDERMAL at 04:08

## 2019-08-07 RX ADMIN — POTASSIUM CHLORIDE 10 MEQ: 10 INJECTION, SOLUTION INTRAVENOUS at 08:08

## 2019-08-07 RX ADMIN — ONDANSETRON 4 MG: 2 INJECTION INTRAMUSCULAR; INTRAVENOUS at 08:08

## 2019-08-07 RX ADMIN — DICYCLOMINE HYDROCHLORIDE 20 MG: 20 INJECTION, SOLUTION INTRAMUSCULAR at 04:08

## 2019-08-07 RX ADMIN — SODIUM CHLORIDE: 0.45 INJECTION, SOLUTION INTRAVENOUS at 07:08

## 2019-08-07 RX ADMIN — PROMETHAZINE HYDROCHLORIDE 6.25 MG: 25 INJECTION INTRAMUSCULAR; INTRAVENOUS at 03:08

## 2019-08-07 RX ADMIN — KETOROLAC TROMETHAMINE 15 MG: 30 INJECTION, SOLUTION INTRAMUSCULAR; INTRAVENOUS at 10:08

## 2019-08-07 RX ADMIN — HYDRALAZINE HYDROCHLORIDE 25 MG: 25 TABLET, FILM COATED ORAL at 04:08

## 2019-08-07 RX ADMIN — ENALAPRIL MALEATE 20 MG: 5 TABLET ORAL at 08:08

## 2019-08-07 RX ADMIN — DICYCLOMINE HYDROCHLORIDE 20 MG: 20 INJECTION, SOLUTION INTRAMUSCULAR at 01:08

## 2019-08-07 RX ADMIN — ONDANSETRON 4 MG: 2 INJECTION INTRAMUSCULAR; INTRAVENOUS at 01:08

## 2019-08-07 RX ADMIN — METHOCARBAMOL 500 MG: 500 TABLET, FILM COATED ORAL at 09:08

## 2019-08-07 RX ADMIN — AMLODIPINE BESYLATE 10 MG: 5 TABLET ORAL at 08:08

## 2019-08-07 RX ADMIN — HYDRALAZINE HYDROCHLORIDE 25 MG: 25 TABLET, FILM COATED ORAL at 10:08

## 2019-08-07 RX ADMIN — PROMETHAZINE HYDROCHLORIDE 6.25 MG: 25 INJECTION INTRAMUSCULAR; INTRAVENOUS at 10:08

## 2019-08-07 NOTE — ASSESSMENT & PLAN NOTE
- on glargine and novolog at home  - SSI/accuchecks  - A1c 7.1  - patient on low side yesterday, increase today, but not eating, cont to monitor

## 2019-08-07 NOTE — ASSESSMENT & PLAN NOTE
- CT A/P no evidence of obstruction, removed NGT  - tox screen presum pos for marijuana and opiods, although patient denied on admission, states he only uses occasionally; per EMR hx of opiod withdrawal, uses street meds to avoid   - possible gastroenteritis vs cannabis induced, possible opiod w/d (patient admitted today he has been using opiods as outpatient)  - antiemetics PRN, antispasmodics  - advance diet  - d/w GI

## 2019-08-07 NOTE — NURSING
Pt has had several episodes of intermittent nausea and abd pain. Lore  consulted. Bentyl given first time. Pt reports mild relief. Then zofran given with good effect at first. Pt reports pain worse after having BM's. He reports BM to be loose and brown and yellow. Pt given phenergan but started vomiting. Called PA again and she ordered a higher dose of zofran and clonidine patch. Zofran started and awaiting patch from pharmacy. Pt appears agitated, diaphoretic and is calling out to God to save him from pain. Pt states if we do not give him pain medication, he will go home. POC reviewed with pt. He is willing to wait and see if zofran and patch work.  Will continue to monitor. Bed low and locked. Call bell within reach    0455: patch placed. Pt states the zofran isn't working. However, he has not vomited again since receiving medication.  Will continue to monitor. Bed low and locked. Call bell within reach    0530 pt actively vomited small amount. Called PA. Ordered prochlorperazine and benadryl. She said to give antiemetic first, and then once emesis resolves, give benadryl. This will decrease chance of aspiration if benadryl makes pt too sleepy. Antiemetic given. As I walked into pt room, he was quiet and laying on side. As soon as he saw me he started crying out to God again to save him. He continued until I walked out of room. Will continue to monitor. Bed low and locked. Call bell within reach.     0645 Pt called to say his iv came out. I started new IV and NS continues to infuse. Benadryl given. Will continue to monitor,

## 2019-08-07 NOTE — CONSULTS
[facility]  Gastroenterology  Consult Note    Patient Name: Emery Barragan  MRN: [id number]  Admission Date: 8/6/2019  Hospital Length of Stay: 0 days  Code Status: Full Code   Attending Provider:   Consulting Provider: Andre Sanchez MD  Primary Care Physician: [facility]  Principal Problem:Intractable vomiting with nausea    Inpatient consult to Gastroenterology  Consult performed by: Andre Sanchez MD  Consult ordered by: Sandra Wilson PA-C  Reason for consult: abdominal pain        Subjective:     HPI: This gent was admitted for abdominal pain that is new onset and started 24 hors ago while brushing his teeth. The pain is krishan-umbilical and associated with nausea and emesis or scant amounts of bilious non-bloody material. Persistence brought him to the ED. The pain is caused by nothing and relieved by nothing. It is moderate and non-radiating. He has had prior abdominal pain that was similar and he had a cholecystectomy and exploratory surgery for lysis of adhesions that followed. His weight has been stable and in the recent past had an upper endoscopy that showed a gastritis and he is on a proton pump inhibitor for this. He had a colon exam in 2012 and 2016 and he has diverticulosis and diminutive polyps were removed last in 2016. As such, he is due for a surveillance exam in 2021. He was given morphine in the ED and he fared well and he has a history of opoid dependence and he curbed his excessive use of alcohol recently. He was treated with hepatitis C and he had a toxicology screen that was presumptive positive. He had a CT in the ED that was non-acute and ha no features to suggest an obstruction. He has requested pain control on multiple occasions. There is no family history of colon cancer or chronic live disease and he continues to smoke which was discouraged. He is followed by GI at [facility] and perhaps he can follow up with his gastroenterologist who is working up his  abdominal pain. No use of NSAID's recently.    Past Medical History:   Diagnosis Date    Chronic back pain     Diabetes mellitus     Gallstones     Glaucoma (increased eye pressure)     Hepatitis C     Hypertension        Past Surgical History:   Procedure Laterality Date    CHOLECYSTECTOMY      COLONOSCOPY N/A 4/27/2016    Performed by [facility] at [facility] ENDO    POLYPECTOMY N/A 4/27/2016    Performed by Andre Sanchez MD at [facility] ENDO       Review of patient's allergies indicates:  No Known Allergies  Family History     None        Tobacco Use    Smoking status: Current Every Day Smoker     Packs/day: 1.00     Types: Cigarettes    Smokeless tobacco: Never Used   Substance and Sexual Activity    Alcohol use: No    Drug use: No    Sexual activity: Not on file     Review of Systems   Constitutional: Positive for activity change and fatigue. Negative for appetite change, chills, diaphoresis, fever and unexpected weight change.   HENT: Negative for congestion, facial swelling and trouble swallowing.    Eyes: Negative for discharge, itching and visual disturbance.   Respiratory: Negative for apnea, cough, choking, chest tightness, shortness of breath, wheezing and stridor.    Cardiovascular: Negative for chest pain and leg swelling.   Gastrointestinal: Positive for abdominal pain, nausea and vomiting. Negative for abdominal distention, anal bleeding, blood in stool, constipation, diarrhea and rectal pain.   Endocrine: Negative for cold intolerance, heat intolerance, polydipsia and polyuria.   Genitourinary: Negative for difficulty urinating and flank pain.   Musculoskeletal: Positive for arthralgias, back pain and myalgias.   Skin: Negative for color change, pallor, rash and wound.   Allergic/Immunologic: Negative for environmental allergies, food allergies and immunocompromised state.   Neurological: Negative for dizziness, light-headedness and headaches.   Hematological: Negative for  adenopathy. Does not bruise/bleed easily.   Psychiatric/Behavioral: Positive for decreased concentration. The patient is nervous/anxious.      Objective:     Vital Signs (Most Recent):  Temp: 97.8 °F (36.6 °C) (08/07/19 0754)  Pulse: 68 (08/07/19 0754)  Resp: 20 (08/07/19 0754)  BP: (!) 168/100 (08/07/19 0754)  SpO2: 99 % (08/07/19 0754) Vital Signs (24h Range):  Temp:  [97.5 °F (36.4 °C)-98.3 °F (36.8 °C)] 97.8 °F (36.6 °C)  Pulse:  [50-76] 68  Resp:  [16-20] 20  SpO2:  [94 %-99 %] 99 %  BP: (150-192)/() 168/100     Weight: 85.6 kg (188 lb 11.4 oz) (08/06/19 1348)  Body mass index is 27.87 kg/m².      Intake/Output Summary (Last 24 hours) at 8/7/2019 0840  Last data filed at 8/7/2019 0400  Gross per 24 hour   Intake --   Output 1200 ml   Net -1200 ml       Lines/Drains/Airways     Peripheral Intravenous Line                 Peripheral IV - Single Lumen 08/07/19 0652 20 G Anterior;Right Wrist less than 1 day                Physical Exam   Constitutional: He is oriented to person, place, and time. He appears well-developed and well-nourished. No distress.   HENT:   Head: Normocephalic and atraumatic.   Mouth/Throat: No oropharyngeal exudate.   Eyes: Pupils are equal, round, and reactive to light. EOM are normal. No scleral icterus.   Neck: Normal range of motion. Neck supple. No JVD present. No thyromegaly present.   Cardiovascular: Normal rate, regular rhythm and normal heart sounds. Exam reveals no gallop.   No murmur heard.  Pulmonary/Chest: Breath sounds normal. No stridor. No respiratory distress. He has no wheezes.   Abdominal: Soft. Bowel sounds are normal. He exhibits no distension and no mass. There is no tenderness. There is no rebound and no guarding. No hernia.   Musculoskeletal: Normal range of motion. He exhibits no edema or deformity.   Neurological: He is alert and oriented to person, place, and time. No cranial nerve deficit.   Skin: Skin is warm and dry. He is not diaphoretic.   Psychiatric:    Appears manipulative, gives inconsistent history with different responses to same question from different physicians   Nursing note and vitals reviewed.      Significant Labs:  Mild leukocytosis, slight elevation in lipase which is non-specific.    Significant Imaging:  CT reviewed and is non-acute    Assessment/Plan: This gent has abdominal pain which is chronic and he has had imaging and several endoscopic procedures and he has a gastroenterologist at [facility] who is working him up. He has no obstructive symptoms and no acute visceral signs. Will not advise endoscopy at this time and will advise using anti-spasmodic agents as well as anti-emetics until he can follow up with GI for continued evaluation of abdominal pain. Would avoid alcohol, tobacco, opioids and NSAID's for now.      Active Diagnoses:    Diagnosis Date Noted POA    PRINCIPAL PROBLEM:  Intractable vomiting with nausea [R11.2] 08/06/2019 Yes    Tobacco abuse [Z72.0] 08/11/2018 Yes    Type 2 diabetes mellitus, with long-term current use of insulin [E11.9, Z79.4] 04/25/2016 Not Applicable    Essential hypertension [I10] 04/25/2016 Yes      Problems Resolved During this Admission:           Thank you for your consult.     Andre Sanchez MD  Gastroenterology  [facility]

## 2019-08-07 NOTE — NURSING
"Pt AAOx4. Pt complained throughout shift of abdominal pain, unrelieved by pain medication. Pt would ask for medications to be through the IV to "work better." Vitals monitored, given Hydralazine for high BP at 1640. IV fluids currently infusing, no adverse reactions noted. Pt had vomiting episode at 1710. Pt currently no longer vomiting. Pt currently in bed, safety measures implemented, bed lowered and locked, bed rails upx2, call light within reach. Will continue to monitor.   "

## 2019-08-07 NOTE — PLAN OF CARE
Discharge Planning:  Patient admitted on 8-6-19  LOS-day 1  Chart reviewed, Care plan discussed  Discussed care plan with treatment team,  attending Dr Galvin/Sandra  Consults following are: GI and case mgt.  PCP updated in Epic: [facility]  [facility] [address]  Current dispo: home tomorrow ?   Transportation: has reliable  Case management  to follow       08/07/19 1847   Discharge Assessment   Assessment Type Discharge Planning Assessment   Confirmed/corrected address and phone number on facesheet? Yes   Assessment information obtained from? Patient;Caregiver;Medical Record   Communicated expected length of stay with patient/caregiver yes   Prior to hospitilization cognitive status: Alert/Oriented   Prior to hospitalization functional status: Independent   Current cognitive status: Alert/Oriented   Current Functional Status: Assistive Equipment   Lives With spouse   Able to Return to Prior Arrangements yes   Is patient able to care for self after discharge? Yes   Patient currently receives any other outside agency services? Yes   Name and contact number of agency or person providing outside services [facility]   Equipment Currently Used at Home none   Do you have any problems affording any of your prescribed medications? No   Is the patient taking medications as prescribed? yes   Does the patient have transportation home? Yes   Transportation Anticipated family or friend will provide;car, drives self   Discharge Plan A Home   Patient/Family in Agreement with Plan yes

## 2019-08-07 NOTE — ASSESSMENT & PLAN NOTE
- elevated on admission, did not take morning med   - resume home med: enalapril 20 mg, amlodipine 10 mg  - clonidine patch placed last night  - hydralazine PRN  - monitor

## 2019-08-07 NOTE — PROGRESS NOTES
[facility] Hospital Medicine  Progress Note    Patient Name: Emery Barragan  MRN: [id number]  Patient Class: OP- Observation   Admission Date: 8/6/2019  Length of Stay: 0 days  Attending Physician: Blas Galvin MD  Primary Care Provider: [facility]        Subjective:     Principal Problem:Intractable vomiting with nausea      HPI:  58 year old male with PMH of HTN, DM, Hep C successfully treated and COPD presented to ED with c/o abdominal pain that began this morning. Stated abdominal pain is periumbilical and associated with nausea and vomiting. No hematemesis. Stated that he had similar symptoms in the past, and was told that he had a bowel obstruction. Last BM was yesterday. Reported stopped drinking alcohol secondary to Hep C diagnosis. Denies any fever, chills, sore throat, chest pain, CP, shortness of breath, diarrhea, and dysuria.  Conitinues to smoke a pack per day, denies illicit drug use. NGT was placed in ED d/t continued vomiting. ED eval afebrile, no WBC, electrolytes unremarkable, lipase mildly elevated. CT A/P no findings to suggest bowel obstruction. Tox screen presumptive pos for marijuana and opiates. Admitted to Observation.            Overview/Hospital Course:  58 year old male admitted with N/V and abdominal pain     No new subjective & objective note has been filed under this hospital service since the last note was generated.      Assessment/Plan:      * Intractable vomiting with nausea  - CT A/P no evidence of obstruction, removed NGT  - tox screen presum pos for marijuana and opiods, although patient denied on admission, states he only uses occasionally; per EMR hx of opiod withdrawal, uses street meds to avoid   - possible gastroenteritis vs cannabis induced, possible opiod w/d (patient admitted today he has been using opiods as outpatient)  - antiemetics PRN, antispasmodics  - advance diet  - d/w GI      Tobacco abuse  - counseled on cessation      Essential  hypertension  - elevated on admission, did not take morning med   - resume home med: enalapril 20 mg, amlodipine 10 mg  - clonidine patch placed last night  - hydralazine PRN  - monitor      Type 2 diabetes mellitus, with long-term current use of insulin  - on glargine and novolog at home  - SSI/accuchecks  - A1c 7.1  - patient on low side yesterday, increase today, but not eating, cont to monitor         VTE Risk Mitigation (From admission, onward)        Ordered     Place sequential compression device  Until discontinued      08/06/19 1338     IP VTE LOW RISK PATIENT  Once      08/06/19 1338     Place sequential compression device  Until discontinued      08/06/19 1338                Sandra Wilson PA-C  Department of Hospital Medicine   [facility]

## 2019-08-07 NOTE — HOSPITAL COURSE
58 year old male admitted with N/V and abdominal pain, CT A/P There are no findings to suggest bowel obstruction. tox screen positive for opioids marijuana. and Placed on IVF's, antiemetics PRN, still c/o pain. Repeat KUB no No dilated bowel loops to suggest obstruction.     EGD tomorrow

## 2019-08-08 LAB
ALBUMIN SERPL BCP-MCNC: 3.9 G/DL (ref 3.5–5.2)
ALP SERPL-CCNC: 92 U/L (ref 55–135)
ALT SERPL W/O P-5'-P-CCNC: 19 U/L (ref 10–44)
ANION GAP SERPL CALC-SCNC: 15 MMOL/L (ref 8–16)
AST SERPL-CCNC: 32 U/L (ref 10–40)
BILIRUB DIRECT SERPL-MCNC: 0.3 MG/DL (ref 0.1–0.3)
BILIRUB SERPL-MCNC: 0.9 MG/DL (ref 0.1–1)
BUN SERPL-MCNC: 25 MG/DL (ref 6–20)
CALCIUM SERPL-MCNC: 10.4 MG/DL (ref 8.7–10.5)
CHLORIDE SERPL-SCNC: 100 MMOL/L (ref 95–110)
CO2 SERPL-SCNC: 20 MMOL/L (ref 23–29)
CREAT SERPL-MCNC: 1.5 MG/DL (ref 0.5–1.4)
EST. GFR  (AFRICAN AMERICAN): 58 ML/MIN/1.73 M^2
EST. GFR  (NON AFRICAN AMERICAN): 50 ML/MIN/1.73 M^2
GLUCOSE SERPL-MCNC: 313 MG/DL (ref 70–110)
LIPASE SERPL-CCNC: 21 U/L (ref 4–60)
POCT GLUCOSE: 203 MG/DL (ref 70–110)
POCT GLUCOSE: 238 MG/DL (ref 70–110)
POCT GLUCOSE: 260 MG/DL (ref 70–110)
POCT GLUCOSE: 307 MG/DL (ref 70–110)
POTASSIUM SERPL-SCNC: 3.9 MMOL/L (ref 3.5–5.1)
PROT SERPL-MCNC: 7.2 G/DL (ref 6–8.4)
SODIUM SERPL-SCNC: 135 MMOL/L (ref 136–145)

## 2019-08-08 PROCEDURE — 63600175 PHARM REV CODE 636 W HCPCS: Performed by: PHYSICIAN ASSISTANT

## 2019-08-08 PROCEDURE — S0028 INJECTION, FAMOTIDINE, 20 MG: HCPCS | Performed by: PHYSICIAN ASSISTANT

## 2019-08-08 PROCEDURE — 99233 PR SUBSEQUENT HOSPITAL CARE,LEVL III: ICD-10-PCS | Mod: ,,, | Performed by: PHYSICIAN ASSISTANT

## 2019-08-08 PROCEDURE — 93010 ELECTROCARDIOGRAM REPORT: CPT | Mod: ,,, | Performed by: INTERNAL MEDICINE

## 2019-08-08 PROCEDURE — 93010 EKG 12-LEAD: ICD-10-PCS | Mod: ,,, | Performed by: INTERNAL MEDICINE

## 2019-08-08 PROCEDURE — 80076 HEPATIC FUNCTION PANEL: CPT

## 2019-08-08 PROCEDURE — 80048 BASIC METABOLIC PNL TOTAL CA: CPT

## 2019-08-08 PROCEDURE — 11000001 HC ACUTE MED/SURG PRIVATE ROOM

## 2019-08-08 PROCEDURE — 25000003 PHARM REV CODE 250: Performed by: PHYSICIAN ASSISTANT

## 2019-08-08 PROCEDURE — 36415 COLL VENOUS BLD VENIPUNCTURE: CPT

## 2019-08-08 PROCEDURE — 25000003 PHARM REV CODE 250: Performed by: INTERNAL MEDICINE

## 2019-08-08 PROCEDURE — 93005 ELECTROCARDIOGRAM TRACING: CPT

## 2019-08-08 PROCEDURE — 99233 SBSQ HOSP IP/OBS HIGH 50: CPT | Mod: ,,, | Performed by: PHYSICIAN ASSISTANT

## 2019-08-08 PROCEDURE — S5571 INSULIN DISPOS PEN 3 ML: HCPCS | Performed by: PHYSICIAN ASSISTANT

## 2019-08-08 PROCEDURE — 83690 ASSAY OF LIPASE: CPT

## 2019-08-08 RX ORDER — METOPROLOL TARTRATE 1 MG/ML
5 INJECTION, SOLUTION INTRAVENOUS ONCE
Status: COMPLETED | OUTPATIENT
Start: 2019-08-08 | End: 2019-08-08

## 2019-08-08 RX ORDER — LATANOPROST 50 UG/ML
1 SOLUTION/ DROPS OPHTHALMIC NIGHTLY
Status: DISCONTINUED | OUTPATIENT
Start: 2019-08-08 | End: 2019-08-10 | Stop reason: HOSPADM

## 2019-08-08 RX ORDER — CALCIUM CARBONATE 200(500)MG
500 TABLET,CHEWABLE ORAL 2 TIMES DAILY PRN
Status: DISCONTINUED | OUTPATIENT
Start: 2019-08-08 | End: 2019-08-10 | Stop reason: HOSPADM

## 2019-08-08 RX ORDER — ENALAPRIL MALEATE 5 MG/1
10 TABLET ORAL ONCE
Status: COMPLETED | OUTPATIENT
Start: 2019-08-08 | End: 2019-08-08

## 2019-08-08 RX ORDER — INSULIN ASPART 100 [IU]/ML
5 INJECTION, SOLUTION INTRAVENOUS; SUBCUTANEOUS ONCE
Status: COMPLETED | OUTPATIENT
Start: 2019-08-08 | End: 2019-08-08

## 2019-08-08 RX ORDER — FAMOTIDINE 10 MG/ML
20 INJECTION INTRAVENOUS ONCE
Status: COMPLETED | OUTPATIENT
Start: 2019-08-08 | End: 2019-08-08

## 2019-08-08 RX ORDER — FAMOTIDINE 10 MG/ML
20 INJECTION INTRAVENOUS 2 TIMES DAILY
Status: DISCONTINUED | OUTPATIENT
Start: 2019-08-08 | End: 2019-08-09

## 2019-08-08 RX ADMIN — LATANOPROST 1 DROP: 50 SOLUTION OPHTHALMIC at 08:08

## 2019-08-08 RX ADMIN — FAMOTIDINE 20 MG: 10 INJECTION, SOLUTION INTRAVENOUS at 08:08

## 2019-08-08 RX ADMIN — DICYCLOMINE HYDROCHLORIDE 20 MG: 20 INJECTION, SOLUTION INTRAMUSCULAR at 01:08

## 2019-08-08 RX ADMIN — INSULIN ASPART 5 UNITS: 100 INJECTION, SOLUTION INTRAVENOUS; SUBCUTANEOUS at 05:08

## 2019-08-08 RX ADMIN — INSULIN DETEMIR 10 UNITS: 100 INJECTION, SOLUTION SUBCUTANEOUS at 09:08

## 2019-08-08 RX ADMIN — SODIUM CHLORIDE: 0.45 INJECTION, SOLUTION INTRAVENOUS at 08:08

## 2019-08-08 RX ADMIN — DICYCLOMINE HYDROCHLORIDE 20 MG: 20 INJECTION, SOLUTION INTRAMUSCULAR at 08:08

## 2019-08-08 RX ADMIN — DICYCLOMINE HYDROCHLORIDE 20 MG: 20 INJECTION, SOLUTION INTRAMUSCULAR at 09:08

## 2019-08-08 RX ADMIN — CALCIUM CARBONATE (ANTACID) CHEW TAB 500 MG 500 MG: 500 CHEW TAB at 06:08

## 2019-08-08 RX ADMIN — ENALAPRIL MALEATE 10 MG: 5 TABLET ORAL at 12:08

## 2019-08-08 RX ADMIN — HYDRALAZINE HYDROCHLORIDE 25 MG: 25 TABLET, FILM COATED ORAL at 05:08

## 2019-08-08 RX ADMIN — AMLODIPINE BESYLATE 10 MG: 5 TABLET ORAL at 08:08

## 2019-08-08 RX ADMIN — HYDRALAZINE HYDROCHLORIDE 25 MG: 25 TABLET, FILM COATED ORAL at 03:08

## 2019-08-08 RX ADMIN — ENALAPRIL MALEATE 20 MG: 5 TABLET ORAL at 05:08

## 2019-08-08 RX ADMIN — DICYCLOMINE HYDROCHLORIDE 20 MG: 20 INJECTION, SOLUTION INTRAMUSCULAR at 04:08

## 2019-08-08 RX ADMIN — METHOCARBAMOL 500 MG: 500 TABLET, FILM COATED ORAL at 08:08

## 2019-08-08 RX ADMIN — FAMOTIDINE 20 MG: 10 INJECTION, SOLUTION INTRAVENOUS at 01:08

## 2019-08-08 RX ADMIN — METOPROLOL TARTRATE 5 MG: 5 INJECTION INTRAVENOUS at 01:08

## 2019-08-08 RX ADMIN — DIPHENHYDRAMINE HYDROCHLORIDE 25 MG: 25 CAPSULE ORAL at 02:08

## 2019-08-08 RX ADMIN — SODIUM CHLORIDE: 0.45 INJECTION, SOLUTION INTRAVENOUS at 09:08

## 2019-08-08 RX ADMIN — ONDANSETRON 4 MG: 2 INJECTION INTRAMUSCULAR; INTRAVENOUS at 08:08

## 2019-08-08 RX ADMIN — METHOCARBAMOL 500 MG: 500 TABLET, FILM COATED ORAL at 12:08

## 2019-08-08 RX ADMIN — INSULIN ASPART 1 UNITS: 100 INJECTION, SOLUTION INTRAVENOUS; SUBCUTANEOUS at 09:08

## 2019-08-08 RX ADMIN — METHOCARBAMOL 500 MG: 500 TABLET, FILM COATED ORAL at 04:08

## 2019-08-08 RX ADMIN — INSULIN ASPART 5 UNITS: 100 INJECTION, SOLUTION INTRAVENOUS; SUBCUTANEOUS at 08:08

## 2019-08-08 NOTE — PROGRESS NOTES
[facility] Hospital Medicine  Progress Note    Patient Name: Emery Barragan  MRN: [id number]  Patient Class: IP- Inpatient   Admission Date: 8/6/2019  Length of Stay: 1 days  Attending Physician: Blas Galvin MD  Primary Care Provider: [facility]        Subjective:     Principal Problem:Intractable vomiting with nausea      HPI:  58 year old male with PMH of HTN, DM, Hep C successfully treated and COPD presented to ED with c/o abdominal pain that began this morning. Stated abdominal pain is periumbilical and associated with nausea and vomiting. No hematemesis. Stated that he had similar symptoms in the past, and was told that he had a bowel obstruction. Last BM was yesterday. Reported stopped drinking alcohol secondary to Hep C diagnosis. Denies any fever, chills, sore throat, chest pain, CP, shortness of breath, diarrhea, and dysuria.  Conitinues to smoke a pack per day, denies illicit drug use. NGT was placed in ED d/t continued vomiting. ED eval afebrile, no WBC, electrolytes unremarkable, lipase mildly elevated. CT A/P no findings to suggest bowel obstruction. Tox screen presumptive pos for marijuana and opiates. Admitted to Observation.            Overview/Hospital Course:  58 year old male admitted with N/V and abdominal pain, CT A/P There are no findings to suggest bowel obstruction. tox screen positive for opioids marijuana. and Placed on IVF's, antiemetics PRN, still c/o pain. Repeat KUB no No dilated bowel loops to suggest obstruction.     EGD tomorrow    Interval History: no emesis     Review of Systems   Constitutional: Positive for appetite change. Negative for chills and fever.   HENT: Negative for congestion and sore throat.    Eyes: Negative.    Respiratory: Negative for chest tightness and shortness of breath.    Cardiovascular: Negative for chest pain and leg swelling.   Gastrointestinal: Positive for abdominal pain and diarrhea (loose stools). Negative  for abdominal distention, blood in stool, nausea and vomiting.   Endocrine: Negative.    Genitourinary: Negative for difficulty urinating and dysuria.   Musculoskeletal: Positive for back pain (chronic). Negative for arthralgias and neck pain.   Skin: Negative.    Neurological: Negative for dizziness, syncope, light-headedness and headaches.     Objective:     Vital Signs (Most Recent):  Temp: 98.9 °F (37.2 °C) (08/08/19 1202)  Pulse: 96 (08/08/19 1202)  Resp: 16 (08/08/19 1202)  BP: (!) 185/122 (08/08/19 0502)  SpO2: 99 % (08/08/19 1202) Vital Signs (24h Range):  Temp:  [96.3 °F (35.7 °C)-99.7 °F (37.6 °C)] 98.9 °F (37.2 °C)  Pulse:  [] 96  Resp:  [16-20] 16  SpO2:  [95 %-99 %] 99 %  BP: (175-195)/(107-128) 185/122     Weight: 85.6 kg (188 lb 11.4 oz)  Body mass index is 27.87 kg/m².    Intake/Output Summary (Last 24 hours) at 8/8/2019 1217  Last data filed at 8/8/2019 0500  Gross per 24 hour   Intake --   Output 1300 ml   Net -1300 ml      Physical Exam   Constitutional: He is oriented to person, place, and time. He appears well-developed and well-nourished. No distress.   HENT:   Head: Normocephalic and atraumatic.   Mouth/Throat: Oropharynx is clear and moist.   Eyes: Pupils are equal, round, and reactive to light. Conjunctivae are normal. No scleral icterus.   Neck: Normal range of motion. Neck supple.   Cardiovascular: Normal rate, regular rhythm, normal heart sounds and intact distal pulses.   No murmur heard.  Pulmonary/Chest: Effort normal and breath sounds normal. No respiratory distress. He has no wheezes.   Abdominal: Soft. Bowel sounds are normal. He exhibits no distension. There is tenderness (mild periumbilical TTP). There is no rebound and no guarding.   Musculoskeletal: Normal range of motion. He exhibits no edema or tenderness.   Neurological: He is alert and oriented to person, place, and time.   Skin: Skin is warm and dry. Capillary refill takes less than 2 seconds. He is not diaphoretic.    Psychiatric: He has a normal mood and affect.   Nursing note and vitals reviewed.      Significant Labs:   CBC:   Recent Labs   Lab 08/07/19  0515   WBC 14.96*   HGB 16.4   HCT 48.6        CMP:   Recent Labs   Lab 08/07/19 0515 08/08/19  0500    135*   K 3.3* 3.9    100   CO2 18* 20*   * 313*   BUN 11 25*   CREATININE 1.2 1.5*   CALCIUM 9.6 10.4   PROT  --  7.2   ALBUMIN  --  3.9   BILITOT  --  0.9   ALKPHOS  --  92   AST  --  32   ALT  --  19   ANIONGAP 14 15   EGFRNONAA >60 50*     All pertinent labs within the past 24 hours have been reviewed.    Significant Imaging: I have reviewed all pertinent imaging results/findings within the past 24 hours.   Imaging Results          CT Abdomen Pelvis With Contrast (Final result)  Result time 08/06/19 10:49:33    Final result by Patrick Mayberry MD (08/06/19 10:49:33)                 Impression:      NG tube with the tip within the proximal stomach.  Note that no dilated loops of bowel are evident.  There are no findings to suggest bowel obstruction.    Small hiatal hernia.    S/p cholecystectomy.    Bilateral renal cysts.    Bilateral renal adrenal gland hyperplasia, stable.      Electronically signed by: Patrick Mayberry MD  Date:    08/06/2019  Time:    10:49             Narrative:    EXAMINATION:  CT ABDOMEN PELVIS WITH CONTRAST    CLINICAL HISTORY:  Bowel obstruction, high-grade;    TECHNIQUE:  Low dose axial images, sagittal and coronal reformations were obtained from the lung bases to the pubic symphysis following the IV administration of 100 mL of Omnipaque 350 .  Oral contrast was not given.    COMPARISON:  08/11/2018.    FINDINGS:  There are mild dependent atelectatic changes within the lung bases.  There is a calcified granuloma within the right lower lobe posteriorly.  The bones appear intact.  There is no evidence for acute fracture or bone destruction.  The liver is normal in size and is homogeneous in density with no focal  abnormalities of the liver identified.  Patient is s/p cholecystectomy.  There is prominence of the extrahepatic common bile duct, however this is similar to the prior examination and is likely related to the patient's post cholecystectomy state.  There is a small hiatal hernia.  There is an enteric tube with the tip within the proximal stomach.  The remainder of the stomach appears grossly unremarkable.  The spleen and pancreas both appear grossly unremarkable.  There is bilateral adrenal gland thickening, similar to the prior examination.  There are bilateral renal hypodensities present with the largest located within the upper pole of the left kidney most consistent with bilateral renal cysts.  There is no evidence for hydronephrosis.  The abdominal aorta tapers normally without aneurysmal dilatation.  No para-aortic lymphadenopathy is identified.  The appendix is present and appears grossly unremarkable.  There are no dilated loops of bowel evident.  No ascites is identified.  The prostate gland does appear prominent in size.  The urinary bladder is unremarkable.  There is no evidence for pelvic or inguinal lymphadenopathy.                               X-Ray Abdomen AP 1 View (KUB) (Final result)  Result time 08/06/19 09:38:49    Final result by Severino Holden MD (08/06/19 09:38:49)                 Impression:      As above      Electronically signed by: Severino Holden MD  Date:    08/06/2019  Time:    09:38             Narrative:    EXAMINATION:  XR ABDOMEN AP 1 VIEW    CLINICAL HISTORY:  Unspecified abdominal pain    TECHNIQUE:  Single AP View of the abdomen was performed.    COMPARISON:  08/11/2018    FINDINGS:  NG tube in place.  However the tip in proximal port or above the level of diaphragm.  This should be advanced on the order of 10 cm.    Cholecystectomy clips in right upper quadrant.    Bowel gas pattern appears nonobstructive.  No dilated loops of bowel identified.  No obvious free air.                                     Assessment/Plan:      * Intractable vomiting with nausea  - CT A/P no evidence of obstruction, removed NGT  - tox screen presum pos for marijuana and opiods, although patient denied on admission, states he only uses occasionally; per EMR hx of opiod withdrawal, uses street meds to avoid   - possible gastroenteritis vs cannabis induced, possible opioid w/d (patient admitted he has been using opiods as outpatient)  - antiemetics PRN, antispasmodics  - advance diet as tolerated  - repeat KUB 8/8/2019 no obstruction  - EGD in a.m.   - d/w GI      Tobacco abuse  - counseled on cessation      Essential hypertension  - elevated on admission   - resume home med: enalapril 20 mg, increase to 30 mg, was also on HCTZ, on hold  - cont amlodipine 10 mg, clonidine patch 0.2   - hydralazine PRN  - monitor      Type 2 diabetes mellitus, with long-term current use of insulin  - on glargine and novolog at home  - SSI/accuchecks  - A1c 7.1  - glucose increasing, dose 5 units now, start LA insulin 10 U qhs  - monitor         VTE Risk Mitigation (From admission, onward)        Ordered     Place sequential compression device  Until discontinued      08/06/19 1338     IP VTE LOW RISK PATIENT  Once      08/06/19 1338     Place sequential compression device  Until discontinued      08/06/19 1338                Sandra Wilson PA-C  Department of Hospital Medicine   [facility]

## 2019-08-08 NOTE — ASSESSMENT & PLAN NOTE
- elevated on admission   - resume home med: enalapril 20 mg, increase to 30 mg, was also on HCTZ, on hold  - cont amlodipine 10 mg, clonidine patch 0.2   - hydralazine PRN  - monitor

## 2019-08-08 NOTE — PROGRESS NOTES
Subjective:       Patient ID: Emery Barragan is a 58 y.o. male.    Chief Complaint:  Emesis (Pt actively vomiting in triage. Pt c/o emesis with generalized abdominal pain.)       History of Present Illness  Pt with abd pain , denies any gi bleed or BM. Pt is having flatus    Review of Systems  Respiratory: negative      Objective:     Vitals:    08/08/19 0502 08/08/19 0701 08/08/19 0800 08/08/19 0803   BP: (!) 185/122      BP Location: Right arm      Patient Position: Sitting      Pulse: (!) 116 (!) 146 (!) 116 (!) 115   Resp:    18   Temp:    98.1 °F (36.7 °C)   TempSrc:       SpO2:    96%   Weight:       Height:          Abdomen: soft mild tenderness    Data Review   Recent Results (from the past 336 hour(s))   CBC with Automated Differential    Collection Time: 08/07/19  5:15 AM   Result Value Ref Range    WBC 14.96 (H) 3.90 - 12.70 K/uL    Hemoglobin 16.4 14.0 - 18.0 g/dL    Hematocrit 48.6 40.0 - 54.0 %    Platelets 173 150 - 350 K/uL   CBC W/ AUTO DIFFERENTIAL    Collection Time: 08/06/19  8:51 AM   Result Value Ref Range    WBC 12.59 3.90 - 12.70 K/uL    Hemoglobin 16.9 14.0 - 18.0 g/dL    Hematocrit 48.6 40.0 - 54.0 %    Platelets 164 150 - 350 K/uL      No results for input(s): APTT, INR, PTT in the last 168 hours.  Recent Labs   Lab 08/06/19  0851 08/07/19  0515 08/08/19  0500    140 135*   K 4.2 3.3* 3.9    108 100   CO2 19* 18* 20*   BUN 18 11 25*   CREATININE 1.3 1.2 1.5*   CALCIUM 9.9 9.6 10.4   PROT 7.5  --  7.2   BILITOT 0.5  --  0.9   ALKPHOS 84  --  92   ALT 17  --  19   AST 28  --  32    No results found for: HAV, HEPAIGM, HEPBIGM, HEPBCAB, HBEAG, HEPCAB No results found for: AFP   No results found for: CEA   No results for input(s): APTT, INR, PTT in the last 168 hours.     Assessment:     1. Intractable vomiting with nausea, unspecified vomiting type    2. Abdominal pain    3. BP (high blood pressure)        Plan:     1) EGD in AM  2) PPI  3) Check KUB  4) repeat labs

## 2019-08-08 NOTE — PROGRESS NOTES
08/07/19 2221 08/08/19 0003   Vital Signs   BP (!) 195/112 (!) 188/121     Pt BP elevated. VIKAS Reinoso notified. One time dose hydralazine ordered and given. Pt complaining of abdominal pain. After reassessing, pt /121 and pulse 117. VIKAS Torrez notified. STAT EKG ordered. Will continue to monitor.

## 2019-08-08 NOTE — SUBJECTIVE & OBJECTIVE
Interval History: no emesis     Review of Systems   Constitutional: Positive for appetite change. Negative for chills and fever.   HENT: Negative for congestion and sore throat.    Eyes: Negative.    Respiratory: Negative for chest tightness and shortness of breath.    Cardiovascular: Negative for chest pain and leg swelling.   Gastrointestinal: Positive for abdominal pain and diarrhea (loose stools). Negative for abdominal distention, blood in stool, nausea and vomiting.   Endocrine: Negative.    Genitourinary: Negative for difficulty urinating and dysuria.   Musculoskeletal: Positive for back pain (chronic). Negative for arthralgias and neck pain.   Skin: Negative.    Neurological: Negative for dizziness, syncope, light-headedness and headaches.     Objective:     Vital Signs (Most Recent):  Temp: 98.9 °F (37.2 °C) (08/08/19 1202)  Pulse: 96 (08/08/19 1202)  Resp: 16 (08/08/19 1202)  BP: (!) 185/122 (08/08/19 0502)  SpO2: 99 % (08/08/19 1202) Vital Signs (24h Range):  Temp:  [96.3 °F (35.7 °C)-99.7 °F (37.6 °C)] 98.9 °F (37.2 °C)  Pulse:  [] 96  Resp:  [16-20] 16  SpO2:  [95 %-99 %] 99 %  BP: (175-195)/(107-128) 185/122     Weight: 85.6 kg (188 lb 11.4 oz)  Body mass index is 27.87 kg/m².    Intake/Output Summary (Last 24 hours) at 8/8/2019 1217  Last data filed at 8/8/2019 0500  Gross per 24 hour   Intake --   Output 1300 ml   Net -1300 ml      Physical Exam   Constitutional: He is oriented to person, place, and time. He appears well-developed and well-nourished. No distress.   HENT:   Head: Normocephalic and atraumatic.   Mouth/Throat: Oropharynx is clear and moist.   Eyes: Pupils are equal, round, and reactive to light. Conjunctivae are normal. No scleral icterus.   Neck: Normal range of motion. Neck supple.   Cardiovascular: Normal rate, regular rhythm, normal heart sounds and intact distal pulses.   No murmur heard.  Pulmonary/Chest: Effort normal and breath sounds normal. No respiratory distress. He  has no wheezes.   Abdominal: Soft. Bowel sounds are normal. He exhibits no distension. There is tenderness (mild periumbilical TTP). There is no rebound and no guarding.   Musculoskeletal: Normal range of motion. He exhibits no edema or tenderness.   Neurological: He is alert and oriented to person, place, and time.   Skin: Skin is warm and dry. Capillary refill takes less than 2 seconds. He is not diaphoretic.   Psychiatric: He has a normal mood and affect.   Nursing note and vitals reviewed.      Significant Labs:   CBC:   Recent Labs   Lab 08/07/19  0515   WBC 14.96*   HGB 16.4   HCT 48.6        CMP:   Recent Labs   Lab 08/07/19 0515 08/08/19  0500    135*   K 3.3* 3.9    100   CO2 18* 20*   * 313*   BUN 11 25*   CREATININE 1.2 1.5*   CALCIUM 9.6 10.4   PROT  --  7.2   ALBUMIN  --  3.9   BILITOT  --  0.9   ALKPHOS  --  92   AST  --  32   ALT  --  19   ANIONGAP 14 15   EGFRNONAA >60 50*     All pertinent labs within the past 24 hours have been reviewed.    Significant Imaging: I have reviewed all pertinent imaging results/findings within the past 24 hours.   Imaging Results          CT Abdomen Pelvis With Contrast (Final result)  Result time 08/06/19 10:49:33    Final result by Patrick Mayberry MD (08/06/19 10:49:33)                 Impression:      NG tube with the tip within the proximal stomach.  Note that no dilated loops of bowel are evident.  There are no findings to suggest bowel obstruction.    Small hiatal hernia.    S/p cholecystectomy.    Bilateral renal cysts.    Bilateral renal adrenal gland hyperplasia, stable.      Electronically signed by: Patrick Mayberry MD  Date:    08/06/2019  Time:    10:49             Narrative:    EXAMINATION:  CT ABDOMEN PELVIS WITH CONTRAST    CLINICAL HISTORY:  Bowel obstruction, high-grade;    TECHNIQUE:  Low dose axial images, sagittal and coronal reformations were obtained from the lung bases to the pubic symphysis following the IV  administration of 100 mL of Omnipaque 350 .  Oral contrast was not given.    COMPARISON:  08/11/2018.    FINDINGS:  There are mild dependent atelectatic changes within the lung bases.  There is a calcified granuloma within the right lower lobe posteriorly.  The bones appear intact.  There is no evidence for acute fracture or bone destruction.  The liver is normal in size and is homogeneous in density with no focal abnormalities of the liver identified.  Patient is s/p cholecystectomy.  There is prominence of the extrahepatic common bile duct, however this is similar to the prior examination and is likely related to the patient's post cholecystectomy state.  There is a small hiatal hernia.  There is an enteric tube with the tip within the proximal stomach.  The remainder of the stomach appears grossly unremarkable.  The spleen and pancreas both appear grossly unremarkable.  There is bilateral adrenal gland thickening, similar to the prior examination.  There are bilateral renal hypodensities present with the largest located within the upper pole of the left kidney most consistent with bilateral renal cysts.  There is no evidence for hydronephrosis.  The abdominal aorta tapers normally without aneurysmal dilatation.  No para-aortic lymphadenopathy is identified.  The appendix is present and appears grossly unremarkable.  There are no dilated loops of bowel evident.  No ascites is identified.  The prostate gland does appear prominent in size.  The urinary bladder is unremarkable.  There is no evidence for pelvic or inguinal lymphadenopathy.                               X-Ray Abdomen AP 1 View (KUB) (Final result)  Result time 08/06/19 09:38:49    Final result by Severino Holden MD (08/06/19 09:38:49)                 Impression:      As above      Electronically signed by: Severino Holden MD  Date:    08/06/2019  Time:    09:38             Narrative:    EXAMINATION:  XR ABDOMEN AP 1 VIEW    CLINICAL  HISTORY:  Unspecified abdominal pain    TECHNIQUE:  Single AP View of the abdomen was performed.    COMPARISON:  08/11/2018    FINDINGS:  NG tube in place.  However the tip in proximal port or above the level of diaphragm.  This should be advanced on the order of 10 cm.    Cholecystectomy clips in right upper quadrant.    Bowel gas pattern appears nonobstructive.  No dilated loops of bowel identified.  No obvious free air.

## 2019-08-08 NOTE — ASSESSMENT & PLAN NOTE
- CT A/P no evidence of obstruction, removed NGT  - tox screen presum pos for marijuana and opiods, although patient denied on admission, states he only uses occasionally; per EMR hx of opiod withdrawal, uses street meds to avoid   - possible gastroenteritis vs cannabis induced, possible opioid w/d (patient admitted he has been using opiods as outpatient)  - antiemetics PRN, antispasmodics  - advance diet as tolerated  - repeat KUB 8/8/2019 no obstruction  - EGD in a.m.   - d/w GI

## 2019-08-08 NOTE — PLAN OF CARE
"Called by RN regarding elevated BP. Patient known to be withdrawing from narcotics. I did apply clonidine 0.2 mg patch last PM, which appears to still be on patient. He is already taking amlodipine 10 mg QD, enalapril 20 mg QD. An EKG was completed and showed sinus tachycardia. A single 5 mg IV push of metoprolol was ordered as he was tachycardic as well as hypertensive. Will monitor with plan to utilized PRN order for hydralazine if BP remains elevated after metoprolol.     1:19 AM - after metoprolol patient asking for "something for heartburn." Pepcid IV ordered.   "

## 2019-08-08 NOTE — ASSESSMENT & PLAN NOTE
- on glargine and novolog at home  - SSI/accuchecks  - A1c 7.1  - glucose increasing, dose 5 units now, start LA insulin 10 U qhs  - monitor

## 2019-08-08 NOTE — PLAN OF CARE
Problem: Adult Inpatient Plan of Care  Goal: Plan of Care Review  Outcome: Ongoing (interventions implemented as appropriate)  Pt's BP remained high throughout shift. Pt placed on telemetry. EKG showed sinus tach. PRN hydralazine and one time doses of hydralazine and metoprolol given with no relief. Pt complains of abdominal pain and heart burn. One time dose of pepcid ordered and given with mild relief. Pt remained free of falls. Bed locked in lowest position, call light and personal items within reach. Will continue to monitor.

## 2019-08-08 NOTE — NURSING
Patient complaining of abdominal cramping, moaning and laying on bed. POC explained. Meds given. POCT checked. Encouraged to eat small meals. IV fluids maintained at ordered rate. Kept call bell withjin reach. Complained of feeling nauseous, PRN zofran given.     Transferred patient to Room 310 via WC, report given to CEFERINO Moody.

## 2019-08-09 ENCOUNTER — ANESTHESIA (OUTPATIENT)
Dept: ENDOSCOPY | Facility: OTHER | Age: 59
DRG: 392 | End: 2019-08-09
Payer: MEDICARE

## 2019-08-09 ENCOUNTER — ANESTHESIA EVENT (OUTPATIENT)
Dept: ENDOSCOPY | Facility: OTHER | Age: 59
DRG: 392 | End: 2019-08-09
Payer: MEDICARE

## 2019-08-09 PROBLEM — K44.9 HIATAL HERNIA: Status: ACTIVE | Noted: 2019-08-09

## 2019-08-09 PROBLEM — K25.9 CAMERON ULCER: Status: ACTIVE | Noted: 2019-08-09

## 2019-08-09 PROBLEM — K29.70 GASTRITIS: Status: ACTIVE | Noted: 2019-08-09

## 2019-08-09 PROBLEM — K20.80 ESOPHAGITIS, LOS ANGELES GRADE D: Status: ACTIVE | Noted: 2019-08-09

## 2019-08-09 LAB
ANION GAP SERPL CALC-SCNC: 12 MMOL/L (ref 8–16)
BUN SERPL-MCNC: 25 MG/DL (ref 6–20)
CALCIUM SERPL-MCNC: 9.8 MG/DL (ref 8.7–10.5)
CHLORIDE SERPL-SCNC: 102 MMOL/L (ref 95–110)
CO2 SERPL-SCNC: 23 MMOL/L (ref 23–29)
CREAT SERPL-MCNC: 1.4 MG/DL (ref 0.5–1.4)
EST. GFR  (AFRICAN AMERICAN): >60 ML/MIN/1.73 M^2
EST. GFR  (NON AFRICAN AMERICAN): 54 ML/MIN/1.73 M^2
GLUCOSE SERPL-MCNC: 194 MG/DL (ref 70–110)
POCT GLUCOSE: 202 MG/DL (ref 70–110)
POCT GLUCOSE: 214 MG/DL (ref 70–110)
POCT GLUCOSE: 220 MG/DL (ref 70–110)
POCT GLUCOSE: 221 MG/DL (ref 70–110)
POTASSIUM SERPL-SCNC: 4.6 MMOL/L (ref 3.5–5.1)
SODIUM SERPL-SCNC: 137 MMOL/L (ref 136–145)

## 2019-08-09 PROCEDURE — 25000003 PHARM REV CODE 250: Performed by: INTERNAL MEDICINE

## 2019-08-09 PROCEDURE — 37000009 HC ANESTHESIA EA ADD 15 MINS: Performed by: INTERNAL MEDICINE

## 2019-08-09 PROCEDURE — 88342 IMHCHEM/IMCYTCHM 1ST ANTB: CPT | Performed by: PATHOLOGY

## 2019-08-09 PROCEDURE — 88305 TISSUE EXAM BY PATHOLOGIST: CPT | Mod: 26,,, | Performed by: PATHOLOGY

## 2019-08-09 PROCEDURE — 99238 HOSP IP/OBS DSCHRG MGMT 30/<: CPT | Mod: ,,, | Performed by: INTERNAL MEDICINE

## 2019-08-09 PROCEDURE — 63600175 PHARM REV CODE 636 W HCPCS: Performed by: PHYSICIAN ASSISTANT

## 2019-08-09 PROCEDURE — 63600175 PHARM REV CODE 636 W HCPCS: Performed by: INTERNAL MEDICINE

## 2019-08-09 PROCEDURE — 88305 TISSUE SPECIMEN TO PATHOLOGY: ICD-10-PCS | Mod: 26,,, | Performed by: PATHOLOGY

## 2019-08-09 PROCEDURE — 37000008 HC ANESTHESIA 1ST 15 MINUTES: Performed by: INTERNAL MEDICINE

## 2019-08-09 PROCEDURE — 88305 TISSUE EXAM BY PATHOLOGIST: CPT | Performed by: PATHOLOGY

## 2019-08-09 PROCEDURE — 25000003 PHARM REV CODE 250: Performed by: PHYSICIAN ASSISTANT

## 2019-08-09 PROCEDURE — 36415 COLL VENOUS BLD VENIPUNCTURE: CPT

## 2019-08-09 PROCEDURE — 43239 EGD BIOPSY SINGLE/MULTIPLE: CPT | Performed by: INTERNAL MEDICINE

## 2019-08-09 PROCEDURE — 80048 BASIC METABOLIC PNL TOTAL CA: CPT

## 2019-08-09 PROCEDURE — 99238 PR HOSPITAL DISCHARGE DAY,<30 MIN: ICD-10-PCS | Mod: ,,, | Performed by: INTERNAL MEDICINE

## 2019-08-09 PROCEDURE — 11000001 HC ACUTE MED/SURG PRIVATE ROOM

## 2019-08-09 PROCEDURE — 94761 N-INVAS EAR/PLS OXIMETRY MLT: CPT

## 2019-08-09 PROCEDURE — 88342 IMHCHEM/IMCYTCHM 1ST ANTB: CPT | Mod: 26,,, | Performed by: PATHOLOGY

## 2019-08-09 PROCEDURE — 63600175 PHARM REV CODE 636 W HCPCS: Performed by: NURSE ANESTHETIST, CERTIFIED REGISTERED

## 2019-08-09 PROCEDURE — 88342 TISSUE SPECIMEN TO PATHOLOGY: ICD-10-PCS | Mod: 26,,, | Performed by: PATHOLOGY

## 2019-08-09 PROCEDURE — 63600175 PHARM REV CODE 636 W HCPCS: Performed by: ANESTHESIOLOGY

## 2019-08-09 RX ORDER — CHLORPROMAZINE HYDROCHLORIDE 25 MG/ML
25 INJECTION INTRAMUSCULAR ONCE
Status: COMPLETED | OUTPATIENT
Start: 2019-08-09 | End: 2019-08-09

## 2019-08-09 RX ORDER — SODIUM CHLORIDE, SODIUM LACTATE, POTASSIUM CHLORIDE, CALCIUM CHLORIDE 600; 310; 30; 20 MG/100ML; MG/100ML; MG/100ML; MG/100ML
INJECTION, SOLUTION INTRAVENOUS CONTINUOUS PRN
Status: DISCONTINUED | OUTPATIENT
Start: 2019-08-09 | End: 2019-08-09

## 2019-08-09 RX ORDER — PHENOBARBITAL 20 MG/5ML
16.2 ELIXIR ORAL 3 TIMES DAILY
Status: DISCONTINUED | OUTPATIENT
Start: 2019-08-09 | End: 2019-08-10 | Stop reason: HOSPADM

## 2019-08-09 RX ORDER — SCOLOPAMINE TRANSDERMAL SYSTEM 1 MG/1
1 PATCH, EXTENDED RELEASE TRANSDERMAL
Status: DISCONTINUED | OUTPATIENT
Start: 2019-08-09 | End: 2019-08-10 | Stop reason: HOSPADM

## 2019-08-09 RX ORDER — PROMETHAZINE HYDROCHLORIDE 25 MG/1
25 TABLET ORAL EVERY 6 HOURS PRN
Qty: 20 TABLET | Refills: 0 | Status: SHIPPED | OUTPATIENT
Start: 2019-08-09 | End: 2022-04-05

## 2019-08-09 RX ORDER — PANTOPRAZOLE SODIUM 40 MG/1
40 TABLET, DELAYED RELEASE ORAL 2 TIMES DAILY
Qty: 60 TABLET | Refills: 3 | Status: SHIPPED | OUTPATIENT
Start: 2019-08-09 | End: 2022-04-05

## 2019-08-09 RX ORDER — PROPOFOL 10 MG/ML
VIAL (ML) INTRAVENOUS
Status: DISCONTINUED | OUTPATIENT
Start: 2019-08-09 | End: 2019-08-09

## 2019-08-09 RX ORDER — SUCRALFATE 1 G/10ML
1 SUSPENSION ORAL ONCE
Status: COMPLETED | OUTPATIENT
Start: 2019-08-09 | End: 2019-08-09

## 2019-08-09 RX ORDER — MEPERIDINE HYDROCHLORIDE 25 MG/ML
12.5 INJECTION INTRAMUSCULAR; INTRAVENOUS; SUBCUTANEOUS ONCE AS NEEDED
Status: DISCONTINUED | OUTPATIENT
Start: 2019-08-09 | End: 2019-08-09

## 2019-08-09 RX ORDER — HYOSCYAMINE SULFATE 0.12 MG/1
0.12 TABLET SUBLINGUAL 3 TIMES DAILY
Status: DISCONTINUED | OUTPATIENT
Start: 2019-08-09 | End: 2019-08-10 | Stop reason: HOSPADM

## 2019-08-09 RX ORDER — HYDROMORPHONE HYDROCHLORIDE 2 MG/ML
0.4 INJECTION, SOLUTION INTRAMUSCULAR; INTRAVENOUS; SUBCUTANEOUS EVERY 5 MIN PRN
Status: DISCONTINUED | OUTPATIENT
Start: 2019-08-09 | End: 2019-08-09

## 2019-08-09 RX ORDER — DULOXETIN HYDROCHLORIDE 20 MG/1
20 CAPSULE, DELAYED RELEASE ORAL 2 TIMES DAILY
Status: DISCONTINUED | OUTPATIENT
Start: 2019-08-09 | End: 2019-08-10 | Stop reason: HOSPADM

## 2019-08-09 RX ORDER — OXYCODONE HYDROCHLORIDE 5 MG/1
5 TABLET ORAL
Status: DISCONTINUED | OUTPATIENT
Start: 2019-08-09 | End: 2019-08-09

## 2019-08-09 RX ORDER — SODIUM CHLORIDE 0.9 % (FLUSH) 0.9 %
3 SYRINGE (ML) INJECTION
Status: DISCONTINUED | OUTPATIENT
Start: 2019-08-09 | End: 2019-08-10 | Stop reason: HOSPADM

## 2019-08-09 RX ORDER — PANTOPRAZOLE SODIUM 40 MG/1
40 TABLET, DELAYED RELEASE ORAL 2 TIMES DAILY
Status: DISCONTINUED | OUTPATIENT
Start: 2019-08-09 | End: 2019-08-10 | Stop reason: HOSPADM

## 2019-08-09 RX ORDER — LIDOCAINE HCL/PF 100 MG/5ML
SYRINGE (ML) INTRAVENOUS
Status: DISCONTINUED | OUTPATIENT
Start: 2019-08-09 | End: 2019-08-09

## 2019-08-09 RX ORDER — ONDANSETRON 2 MG/ML
4 INJECTION INTRAMUSCULAR; INTRAVENOUS DAILY PRN
Status: DISCONTINUED | OUTPATIENT
Start: 2019-08-09 | End: 2019-08-09

## 2019-08-09 RX ORDER — PHENOBARBITAL WITH BELLADONNA ALKALOIDS 16.2; .1037; .0194; .0065 MG/5ML; MG/5ML; MG/5ML; MG/5ML
5 ELIXIR ORAL 3 TIMES DAILY
Status: DISCONTINUED | OUTPATIENT
Start: 2019-08-09 | End: 2019-08-09

## 2019-08-09 RX ADMIN — PHENOBARBITAL 16.2 MG: 20 ELIXIR ORAL at 06:08

## 2019-08-09 RX ADMIN — SCOPALAMINE 1 PATCH: 1 PATCH, EXTENDED RELEASE TRANSDERMAL at 06:08

## 2019-08-09 RX ADMIN — HYDRALAZINE HYDROCHLORIDE 25 MG: 25 TABLET, FILM COATED ORAL at 05:08

## 2019-08-09 RX ADMIN — HYOSCYAMINE SULFATE 0.12 MG: 0.12 TABLET ORAL at 06:08

## 2019-08-09 RX ADMIN — LIDOCAINE HYDROCHLORIDE 100 MG: 20 INJECTION, SOLUTION INTRAVENOUS at 07:08

## 2019-08-09 RX ADMIN — METHOCARBAMOL 500 MG: 500 TABLET, FILM COATED ORAL at 02:08

## 2019-08-09 RX ADMIN — SODIUM CHLORIDE, SODIUM LACTATE, POTASSIUM CHLORIDE, AND CALCIUM CHLORIDE: .6; .31; .03; .02 INJECTION, SOLUTION INTRAVENOUS at 07:08

## 2019-08-09 RX ADMIN — METHOCARBAMOL 500 MG: 500 TABLET, FILM COATED ORAL at 09:08

## 2019-08-09 RX ADMIN — PROPOFOL 30 MG: 10 INJECTION, EMULSION INTRAVENOUS at 07:08

## 2019-08-09 RX ADMIN — PROPOFOL 20 MG: 10 INJECTION, EMULSION INTRAVENOUS at 07:08

## 2019-08-09 RX ADMIN — METHOCARBAMOL 500 MG: 500 TABLET, FILM COATED ORAL at 05:08

## 2019-08-09 RX ADMIN — INSULIN DETEMIR 10 UNITS: 100 INJECTION, SOLUTION SUBCUTANEOUS at 09:08

## 2019-08-09 RX ADMIN — INSULIN ASPART 2 UNITS: 100 INJECTION, SOLUTION INTRAVENOUS; SUBCUTANEOUS at 05:08

## 2019-08-09 RX ADMIN — INSULIN ASPART 2 UNITS: 100 INJECTION, SOLUTION INTRAVENOUS; SUBCUTANEOUS at 02:08

## 2019-08-09 RX ADMIN — INSULIN ASPART 1 UNITS: 100 INJECTION, SOLUTION INTRAVENOUS; SUBCUTANEOUS at 09:08

## 2019-08-09 RX ADMIN — AMLODIPINE BESYLATE 10 MG: 5 TABLET ORAL at 09:08

## 2019-08-09 RX ADMIN — CALCIUM CARBONATE (ANTACID) CHEW TAB 500 MG 500 MG: 500 CHEW TAB at 09:08

## 2019-08-09 RX ADMIN — PHENOBARBITAL 16.2 MG: 20 ELIXIR ORAL at 09:08

## 2019-08-09 RX ADMIN — SODIUM CHLORIDE: 0.45 INJECTION, SOLUTION INTRAVENOUS at 06:08

## 2019-08-09 RX ADMIN — DICYCLOMINE HYDROCHLORIDE 20 MG: 20 INJECTION, SOLUTION INTRAMUSCULAR at 02:08

## 2019-08-09 RX ADMIN — CHLORPROMAZINE HYDROCHLORIDE 25 MG: 25 INJECTION INTRAMUSCULAR at 05:08

## 2019-08-09 RX ADMIN — SODIUM CHLORIDE: 0.45 INJECTION, SOLUTION INTRAVENOUS at 11:08

## 2019-08-09 RX ADMIN — CALCIUM CARBONATE (ANTACID) CHEW TAB 500 MG 500 MG: 500 CHEW TAB at 12:08

## 2019-08-09 RX ADMIN — LATANOPROST 1 DROP: 50 SOLUTION OPHTHALMIC at 09:08

## 2019-08-09 RX ADMIN — PANTOPRAZOLE SODIUM 40 MG: 40 TABLET, DELAYED RELEASE ORAL at 09:08

## 2019-08-09 RX ADMIN — HYOSCYAMINE SULFATE 0.12 MG: 0.12 TABLET ORAL at 09:08

## 2019-08-09 RX ADMIN — DULOXETINE HYDROCHLORIDE 20 MG: 20 CAPSULE, DELAYED RELEASE ORAL at 05:08

## 2019-08-09 RX ADMIN — PROPOFOL 50 MG: 10 INJECTION, EMULSION INTRAVENOUS at 07:08

## 2019-08-09 RX ADMIN — SUCRALFATE 1 G: 1 SUSPENSION ORAL at 02:08

## 2019-08-09 RX ADMIN — DICYCLOMINE HYDROCHLORIDE 20 MG: 20 INJECTION, SOLUTION INTRAMUSCULAR at 09:08

## 2019-08-09 NOTE — PROGRESS NOTES
Pt having frequent hiccups throughout the night and requesting medication for it, Lore BEAN aware, thorazine 1x dose ordered.WCTM

## 2019-08-09 NOTE — PROGRESS NOTES
EGD today.  See Provation for full report.      Findings:  -LA grade D esophagitis from the GE junction (41 cm) to 26 cm  -Hiatal hernia, 3 cm  -Kofi's erosions  -Mild patchy antral erythema, biopsies for H pylori  -Normal duodenum    Plan:  -Stop Pepcid  -Start Pantoprazole 40 mg BID  -Avoid MJ/Opiates  -Advance diet  -Repeat EGD in 8 weeks to verify healing

## 2019-08-09 NOTE — ASSESSMENT & PLAN NOTE
- CT A/P no evidence of obstruction, removed NGT  - tox screen presum pos for marijuana and opiods, although patient denied on admission, states he only uses occasionally; per EMR hx of opiod withdrawal, uses street meds to avoid   - possible gastroenteritis vs cannabis induced, possible opioid w/d (patient admitted he has been using opiods as outpatient)  - antiemetics PRN, antispasmodics  - advance diet as tolerated  - repeat KUB 8/8/2019 no obstruction  - EGD this AM - results as above.   -d/w Dr Flor

## 2019-08-09 NOTE — TRANSFER OF CARE
"Anesthesia Transfer of Care Note    Patient: Emery Yung    Procedure(s) Performed: Procedure(s) (LRB):  ESOPHAGOGASTRODUODENOSCOPY (EGD) (N/A)    Patient location: PACU    Anesthesia Type: general    Transport from OR: Transported from OR on 2-3 L/min O2 by NC with adequate spontaneous ventilation    Post pain: adequate analgesia    Post assessment: no apparent anesthetic complications    Post vital signs: stable    Level of consciousness: awake and alert    Nausea/Vomiting: no nausea/vomiting    Complications: none    Transfer of care protocol was followed      Last vitals:   Visit Vitals  BP (!) 190/123 (BP Location: Right arm, Patient Position: Lying)   Pulse 100   Temp 36.1 °C (97 °F) (Oral)   Resp 20   Ht 5' 9" (1.753 m)   Wt 85.6 kg (188 lb 11.4 oz)   SpO2 97%   BMI 27.87 kg/m²     "

## 2019-08-09 NOTE — ANESTHESIA PREPROCEDURE EVALUATION
08/09/2019  Emery Barragan is a 58 y.o., male.    Anesthesia Evaluation    I have reviewed the Patient Summary Reports.    I have reviewed the Nursing Notes.   I have reviewed the Medications.     Review of Systems  Anesthesia Hx:  No problems with previous Anesthesia  Denies Family Hx of Anesthesia complications.   Denies Personal Hx of Anesthesia complications.   Social:  Smoker 1 ppd   Hematology/Oncology:  Hematology Normal   Oncology Normal     EENT/Dental:  EENT/Dental Normal Eyes: Eye Disease: Glaucoma:      Cardiovascular:   Exercise tolerance: poor Hypertension, poorly controlled    Pulmonary:  Pulmonary Normal    Renal/:  Renal/ Normal     Hepatic/GI:   Hepatitis, C    Musculoskeletal:  Spine Disorders: lumbar    Neurological:  Neurology Normal    Endocrine:   Diabetes, poorly controlled, type 2, using insulin    Dermatological:  Skin Normal    Psych:  Psychiatric Normal           Physical Exam  General:  Well nourished    Airway/Jaw/Neck:  Airway Findings: Mouth Opening: Normal Tongue: Normal  General Airway Assessment: Adult  Mallampati: I  TM Distance: Normal, at least 6 cm  Jaw/Neck Findings:  Neck ROM: Normal ROM      Dental:  Dental Findings: In tact             Anesthesia Plan  Type of Anesthesia, risks & benefits discussed:  Anesthesia Type:  general  Patient's Preference:   Intra-op Monitoring Plan:   Intra-op Monitoring Plan Comments:   Post Op Pain Control Plan:   Post Op Pain Control Plan Comments:   Induction:   IV  Beta Blocker:         Informed Consent: Patient understands risks and agrees with Anesthesia plan.  Questions answered. Anesthesia consent signed with patient.  ASA Score: 3     Day of Surgery Review of History & Physical:    H&P update referred to the surgeon.         Ready For Surgery From Anesthesia Perspective.

## 2019-08-09 NOTE — PHARMACY MED REC
"Admission Medication Reconciliation - Pharmacy Consult Note    The home medication history was taken by Ana Reyes CPhT.  Based on information gathered and subsequent review by the clinical pharmacist, the items below may need attention.    You may go to "Admission" then "Reconcile Home Medications" tabs to review and/or act upon these items.    No issues noted with the medication reconciliation.    Medications Prior to Admission   Medication Sig Dispense Refill Last Dose    amlodipine (NORVASC) 10 MG tablet Take 10 mg by mouth once daily.   8/5/2019    calcium carbonate (TUMS) 200 mg calcium (500 mg) chewable tablet Take 2 tablets by mouth once daily.       enalapril (VASOTEC) 20 MG tablet Take 20 mg by mouth once daily.   8/5/2019    folic acid/multivit-min/lutein (CENTRUM SILVER ORAL) Take 1 tablet by mouth once daily.       hydrochlorothiazide (MICROZIDE) 12.5 mg capsule Take 12.5 mg by mouth once daily.   8/5/2019    insulin glargine (LANTUS) 100 unit/mL injection Inject 40 Units into the skin every morning.    8/5/2019 at Unknown time    insulin lispro 100 unit/mL injection Sliding Scale starting at 2 units       latanoprost 0.005 % ophthalmic solution Place 1 drop into both eyes every evening.       omeprazole (PRILOSEC) 40 MG capsule Take 40 mg by mouth once daily.       ondansetron (ZOFRAN) 4 MG tablet Take 1 tablet (4 mg total) by mouth every 6 (six) hours as needed for Nausea. 15 tablet 0 8/5/2019 at Unknown time    ONETOUCH ULTRA TEST Strp TEST TWICE DAILY.  3 8/5/2019 at Unknown time    BD INSULIN SYRINGE ULTRA-FINE 0.5 mL 31 gauge x 5/16 Syrg INJECT 3 TIMES A DAY AS DIRECTED  3        Please address this information as you see fit.  Feel free to contact us if you have any questions or require assistance.    Shay De Santiago, Pharm.D., BCPS  [phone]                .  .          "

## 2019-08-09 NOTE — DISCHARGE SUMMARY
[facility] Hospital Medicine  Discharge Summary      Patient Name: Emery Barragan  MRN: [id number]  Admission Date: 8/6/2019  Hospital Length of Stay: 2 days  Discharge Date and Time:  08/10/2019 9:57 AM  Attending Physician: Blas Galvin MD   Discharging Provider: Blas Galvin MD  Primary Care Provider: [facility]      HPI:   58 year old male with PMH of HTN, DM, Hep C successfully treated and COPD presented to ED with c/o abdominal pain that began this morning. Stated abdominal pain is periumbilical and associated with nausea and vomiting. No hematemesis. Stated that he had similar symptoms in the past, and was told that he had a bowel obstruction. Last BM was yesterday. Reported stopped drinking alcohol secondary to Hep C diagnosis. Denies any fever, chills, sore throat, chest pain, CP, shortness of breath, diarrhea, and dysuria.  Conitinues to smoke a pack per day, denies illicit drug use. NGT was placed in ED d/t continued vomiting. ED eval afebrile, no WBC, electrolytes unremarkable, lipase mildly elevated. CT A/P no findings to suggest bowel obstruction. Tox screen presumptive pos for marijuana and opiates. Admitted to Observation.          Procedure(s) (LRB):  ESOPHAGOGASTRODUODENOSCOPY (EGD) (N/A)      Hospital Course:   58 year old male admitted with N/V and abdominal pain, CT A/P There are no findings to suggest bowel obstruction. tox screen positive for opioids marijuana. and Placed on IVF's, antiemetics PRN, still c/o pain. Repeat KUB no No dilated bowel loops to suggest obstruction.      EGD showed              Findings:       LA Grade D (one or more mucosal breaks involving at least 75% of        esophageal circumference) esophagitis with no bleeding was found 26        to 41 cm from the incisors.       A medium-sized hiatal hernia was present with associated Kofi's        erosions.       Patchy mild inflammation characterized by erythema was found  in the        gastric antrum. Biopsies were taken with a cold forceps for        histology.       The examined duodenum was normal.    GI recommended bid PPI and f/u    Consults:   Consults (From admission, onward)        Status Ordering Provider     Inpatient consult to Gastroenterology  Once     Provider:  Mike Garcia MD    Completed CHANNING RAHMAN            Final Active Diagnoses:    Diagnosis Date Noted POA    PRINCIPAL PROBLEM:  Esophagitis, McCaulley grade D [K20.8] 08/09/2019 Yes    Hiatal hernia [K44.9] 08/09/2019 Yes    Kofi ulcer [K25.9] 08/09/2019 Yes    Gastritis [K29.70] 08/09/2019 Yes    Intractable vomiting with nausea [R11.2] 08/06/2019 Yes    Tobacco abuse [Z72.0] 08/11/2018 Yes    Type 2 diabetes mellitus, with long-term current use of insulin [E11.9, Z79.4] 04/25/2016 Not Applicable    Essential hypertension [I10] 04/25/2016 Yes      Problems Resolved During this Admission:       Discharged Condition: good    Disposition: Home or Self Care    Follow Up:  Follow-up Information     [employer].    Why:  [facility]. ([facility]akia [facility]AS)  Contact information:  [address] [place] [id number]  [phone]                 Patient Instructions:      Diet diabetic     Activity as tolerated       Significant Diagnostic Studies: EGD            Findings:       LA Grade D (one or more mucosal breaks involving at least 75% of        esophageal circumference) esophagitis with no bleeding was found 26        to 41 cm from the incisors.       A medium-sized hiatal hernia was present with associated Kofi's        erosions.       Patchy mild inflammation characterized by erythema was found in the        gastric antrum. Biopsies were taken with a cold forceps for        histology.       The examined duodenum was normal.    Pending Diagnostic Studies:     None         Medications:  Reconciled Home Medications:      Medication List      START  "taking these medications    atropine-phenobarbital-scopolamine-hyoscyamine 16.2-0.1037 -0.0194 mg Tab  Take 1 tablet by mouth 3 (three) times daily as needed (abdominal pain).     DULoxetine 20 MG capsule  Commonly known as:  CYMBALTA  Take 1 capsule (20 mg total) by mouth 2 (two) times daily.     pantoprazole 40 MG tablet  Commonly known as:  PROTONIX  Take 1 tablet (40 mg total) by mouth 2 (two) times daily.     promethazine 25 MG tablet  Commonly known as:  PHENERGAN  Take 1 tablet (25 mg total) by mouth every 6 (six) hours as needed for Nausea (vomiting).        CONTINUE taking these medications    amLODIPine 10 MG tablet  Commonly known as:  NORVASC  Take 10 mg by mouth once daily.     BD INSULIN SYRINGE ULTRA-FINE 0.5 mL 31 gauge x 5/16" Syrg  Generic drug:  insulin syringe-needle U-100  INJECT 3 TIMES A DAY AS DIRECTED     calcium carbonate 200 mg calcium (500 mg) chewable tablet  Commonly known as:  TUMS  Take 2 tablets by mouth once daily.     CENTRUM SILVER ORAL  Take 1 tablet by mouth once daily.     enalapril 20 MG tablet  Commonly known as:  VASOTEC  Take 20 mg by mouth once daily.     hydroCHLOROthiazide 12.5 mg capsule  Commonly known as:  MICROZIDE  Take 12.5 mg by mouth once daily.     insulin glargine 100 unit/mL injection  Commonly known as:  LANTUS  Inject 40 Units into the skin every morning.     insulin lispro 100 unit/mL injection  Sliding Scale starting at 2 units     latanoprost 0.005 % ophthalmic solution  Place 1 drop into both eyes every evening.     ondansetron 4 MG tablet  Commonly known as:  ZOFRAN  Take 1 tablet (4 mg total) by mouth every 6 (six) hours as needed for Nausea.     ONETOUCH ULTRA TEST Strp  Generic drug:  blood sugar diagnostic  TEST TWICE DAILY.        STOP taking these medications    omeprazole 40 MG capsule  Commonly known as:  PRILOSEC            Indwelling Lines/Drains at time of discharge:   Lines/Drains/Airways     Airway                 Airway - Non-Surgical " 08/09/19 0730 Nasal Cannula less than 1 day                Time spent on the discharge of patient: 30 minutes  Patient was seen and examined on the date of discharge and determined to be suitable for discharge.         Blas Galvin MD  Department of Hospital Medicine  [facility]

## 2019-08-09 NOTE — ANESTHESIA POSTPROCEDURE EVALUATION
Anesthesia Post Evaluation    Patient: Emery Barragan    Procedure(s) Performed: Procedure(s) (LRB):  ESOPHAGOGASTRODUODENOSCOPY (EGD) (N/A)    Final Anesthesia Type: general  Patient location during evaluation: PACU  Patient participation: Yes- Able to Participate  Level of consciousness: awake and alert  Post-procedure vital signs: reviewed and stable  Pain management: adequate  Airway patency: patent  PONV status at discharge: No PONV  Anesthetic complications: no      Cardiovascular status: blood pressure returned to baseline  Respiratory status: unassisted  Hydration status: euvolemic  Follow-up not needed.          Vitals Value Taken Time   /98 8/9/2019  8:28 AM   Temp 37.1 °C (98.7 °F) 8/9/2019  8:15 AM   Pulse 101 8/9/2019  8:29 AM   Resp 16 8/9/2019  8:15 AM   SpO2 98 % 8/9/2019  8:28 AM   Vitals shown include unvalidated device data.      Event Time     Out of Recovery 08/09/2019 08:29:00          Pain/Gloira Score: Pain Rating Post Med Admin: 2 (8/9/2019  3:00 AM)  Gloria Score: 9 (8/9/2019  8:19 AM)

## 2019-08-09 NOTE — CARE UPDATE
"Pt continues to report "10/10"abdominal pain  Although objectively  Several times I have peeked in on him he is sleeping.  He expresses concern going home and states will likely return to the ED.    EGD this AM showed   LA Grade D (one or more mucosal breaks involving at least 75% of        esophageal circumference) esophagitis with no bleeding was found 26        to 41 cm from the incisors.       A medium-sized hiatal hernia was present with associated Kofi's        erosions.       Patchy mild inflammation characterized by erythema was found in the        gastric antrum. Biopsies were taken with a cold forceps for        histology.       The examined duodenum was normal.    His CT from 8/6 was really unremarkable.    He has a c-scope in 2016 with showed only benign polyps.    UTOX consistently + for opiates and thc.    [place] RX Database reviewed - he has NO  RX for opoids in past 2 years.    My impression is he has a functional abdominal pain syndrome  along with  Opoid dependence    Will try adding cymbalta - start at 20 mg and titrate.  Also add donnatal tid  Continue PPI  NO narcotics!  Re-eval in AM          "

## 2019-08-09 NOTE — PLAN OF CARE
Problem: Adult Inpatient Plan of Care  Goal: Patient-Specific Goal (Individualization)  Outcome: Ongoing (interventions implemented as appropriate)  Pt free of falls/trauma/injuries this shift.VSS. .45% NS infusing, medication administered as perscribed. PRN Tums and carafate administered for indigestion. Scheduled IM bentyl and methocarbamol given for abdominal pain and cramping. PT NPO at midnight for possible EGD tomorrow. Patient tolerating POC well. Pt verbalizes understanding of care. Pt denies any chest pain, SOB, or any other discomforts at this time. Will continue to monitor.

## 2019-08-09 NOTE — PLAN OF CARE
Problem: Adult Inpatient Plan of Care  Goal: Plan of Care Review  Outcome: Ongoing (interventions implemented as appropriate)  Pt stated he was in pain throughout shift, ranging from 8/10 to 10/10.  Pt c/o abdominal pain and heartburn.  Pt had EGD in a.m.  Very poor appetite in afternoon.  BP WNL.  AM Enlapril held per Dr. Galvin.  D/c held after pt's concern over pain control conveyed to MD.  Visitor in afternoon.  Purposeful hourly rounding completed.  Safety measures in place.  Will continue to monitor.

## 2019-08-09 NOTE — [employer] PATIENT INSTRUCTIONS
Discharge Summary/Instructions after an Endoscopic Procedure  Patient Name: Emery Barragan  Patient MRN: [id number]  Patient YOB: 1960 Friday, August 09, 2019  Mike Garcia MD  RESTRICTIONS:  During your procedure today, you received medications for sedation.  These   medications may affect your judgment, balance and coordination.  Therefore,   for 24 hours, you have the following restrictions:   - DO NOT drive a car, operate machinery, make legal/financial decisions,   sign important papers or drink alcohol.    ACTIVITY:  Today: no heavy lifting, straining or running due to procedural   sedation/anesthesia.  The following day: return to full activity including work.  DIET:  Eat and drink normally unless instructed otherwise.     TREATMENT FOR COMMON SIDE EFFECTS:  - Mild abdominal pain, nausea, belching, bloating or excessive gas:  rest,   eat lightly and use a heating pad.  - Sore Throat: treat with throat lozenges and/or gargle with warm salt   water.  - Because air was used during the procedure, expelling large amounts of air   from your rectum or belching is normal.  - If a bowel prep was taken, you may not have a bowel movement for 1-3 days.    This is normal.  SYMPTOMS TO WATCH FOR AND REPORT TO YOUR PHYSICIAN:  1. Abdominal pain or bloating, other than gas cramps.  2. Chest pain.  3. Back pain.  4. Signs of infection such as: chills or fever occurring within 24 hours   after the procedure.  5. Rectal bleeding, which would show as bright red, maroon, or black stools.   (A tablespoon of blood from the rectum is not serious, especially if   hemorrhoids are present.)  6. Vomiting.  7. Weakness or dizziness.  GO DIRECTLY TO THE NEAREST EMERGENCY ROOM IF YOU HAVE ANY OF THE FOLLOWING:      Difficulty breathing              Chills and/or fever over 101 F   Persistent vomiting and/or vomiting blood   Severe abdominal pain   Severe chest pain   Black, tarry stools   Bleeding- more than one  tablespoon   Any other symptom or condition that you feel may need urgent attention  Your doctor recommends these additional instructions:  If any biopsies were taken, your doctors clinic will contact you in 1 to 2   weeks with any results.  - Diabetic (ADA) diet.   - Use Protonix (pantoprazole) 40 mg PO BID.   - Await pathology results.   - Repeat upper endoscopy in 8 weeks to check healing.   - Return to GI office in 2 weeks.   - Return patient to hospital tabor for possible discharge same day.  For questions, problems or results please call your physician - Mike Garcia MD at Work:  [phone].  [facility], EMERGENCY ROOM PHONE NUMBER: [phone], [facility], operated by [facility]   [phone].  IF A COMPLICATION OR EMERGENCY SITUATION ARISES AND YOU ARE UNABLE TO REACH   YOUR PHYSICIAN - GO DIRECTLY TO THE EMERGENCY ROOM.  MD Mike Jim MD  8/9/2019 7:51:38 AM  This report has been verified and signed electronically.  [employer]

## 2019-08-09 NOTE — PLAN OF CARE
Dental clinics that will provide services for uninsured or sliding scale cost:  [facility] [phone]  [facility] [phone]  Ext [phone]  Next available appointment is Tues Sept 3 at 9 am on [facility], pt will need $55 copay for initial visit    DC sheet and blue folder provided to pt and all information added to AVS.   Explained to pt, questions answered, verbalizes understanding.     No further DC needs.

## 2019-08-09 NOTE — PROGRESS NOTES
Dr. Galvin made aware of pt's rising temperature, respiration rate of 28, BM consistency and continued complaints of abd pain.  RN instructed to wait an hour or two until and recheck.  Pt has no appetite.

## 2019-08-09 NOTE — PLAN OF CARE
08/09/19 1019   Final Note   Assessment Type Final Discharge Note   Anticipated Discharge Disposition Home   What phone number can be called within the next 1-3 days to see how you are doing after discharge? [phone]   Hospital Follow Up  Appt(s) scheduled? Yes   Discharge plans and expectations educations in teach back method with documentation complete? Yes   Right Care Referral Info   Post Acute Recommendation No Care

## 2019-08-09 NOTE — SUBJECTIVE & OBJECTIVE
Interval History: no emesis   EGD this am                Findings:       [place] Grade D (one or more mucosal breaks involving at least 75% of        esophageal circumference) esophagitis with no bleeding was found 26        to 41 cm from the incisors.       A medium-sized hiatal hernia was present with associated Kofi's        erosions.       Patchy mild inflammation characterized by erythema was found in the        gastric antrum. Biopsies were taken with a cold forceps for        histology.       The examined duodenum was normal.    Review of Systems   Constitutional: Positive for appetite change. Negative for chills and fever.   HENT: Negative for congestion and sore throat.    Eyes: Negative.    Respiratory: Negative for chest tightness and shortness of breath.    Cardiovascular: Negative for chest pain and leg swelling.   Gastrointestinal: Positive for abdominal pain and diarrhea (loose stools). Negative for abdominal distention, blood in stool, nausea and vomiting.   Endocrine: Negative.    Genitourinary: Negative for difficulty urinating and dysuria.   Musculoskeletal: Positive for back pain (chronic). Negative for arthralgias and neck pain.   Skin: Negative.    Neurological: Negative for dizziness, syncope, light-headedness and headaches.     Objective:     Vital Signs (Most Recent):  Temp: 99.3 °F (37.4 °C) (08/09/19 0916)  Pulse: 109 (08/09/19 0916)  Resp: (!) 28 (08/09/19 0916)  BP: 139/85 (08/09/19 0916)  SpO2: 95 % (08/09/19 0916) Vital Signs (24h Range):  Temp:  [97 °F (36.1 °C)-99.8 °F (37.7 °C)] 99.3 °F (37.4 °C)  Pulse:  [] 109  Resp:  [16-28] 28  SpO2:  [95 %-99 %] 95 %  BP: (139-193)/() 139/85     Weight: 85.6 kg (188 lb 11.4 oz)  Body mass index is 27.87 kg/m².    Intake/Output Summary (Last 24 hours) at 8/9/2019 0952  Last data filed at 8/9/2019 0900  Gross per 24 hour   Intake 3291 ml   Output 2950 ml   Net 341 ml      Physical Exam   Constitutional: He is oriented to person, place,  and time. He appears well-developed and well-nourished. No distress.   HENT:   Head: Normocephalic and atraumatic.   Mouth/Throat: Oropharynx is clear and moist.   Eyes: Pupils are equal, round, and reactive to light. Conjunctivae are normal. No scleral icterus.   Neck: Normal range of motion. Neck supple.   Cardiovascular: Normal rate, regular rhythm, normal heart sounds and intact distal pulses.   No murmur heard.  Pulmonary/Chest: Effort normal and breath sounds normal. No respiratory distress. He has no wheezes.   Abdominal: Soft. Bowel sounds are normal. He exhibits no distension. There is tenderness (mild periumbilical TTP). There is no rebound and no guarding.   Musculoskeletal: Normal range of motion. He exhibits no edema or tenderness.   Neurological: He is alert and oriented to person, place, and time.   Skin: Skin is warm and dry. Capillary refill takes less than 2 seconds. He is not diaphoretic.   Psychiatric: He has a normal mood and affect.   Nursing note and vitals reviewed.      Significant Labs:   CBC:   No results for input(s): WBC, HGB, HCT, PLT in the last 48 hours.  CMP:   Recent Labs   Lab 08/08/19  0500 08/09/19  0522   * 137   K 3.9 4.6    102   CO2 20* 23   * 194*   BUN 25* 25*   CREATININE 1.5* 1.4   CALCIUM 10.4 9.8   PROT 7.2  --    ALBUMIN 3.9  --    BILITOT 0.9  --    ALKPHOS 92  --    AST 32  --    ALT 19  --    ANIONGAP 15 12   EGFRNONAA 50* 54*     All pertinent labs within the past 24 hours have been reviewed.    Significant Imaging: I have reviewed all pertinent imaging results/findings within the past 24 hours.   Imaging Results          CT Abdomen Pelvis With Contrast (Final result)  Result time 08/06/19 10:49:33    Final result by Patrick Mayberry MD (08/06/19 10:49:33)                 Impression:      NG tube with the tip within the proximal stomach.  Note that no dilated loops of bowel are evident.  There are no findings to suggest bowel  obstruction.    Small hiatal hernia.    S/p cholecystectomy.    Bilateral renal cysts.    Bilateral renal adrenal gland hyperplasia, stable.      Electronically signed by: Patrick Mayberry MD  Date:    08/06/2019  Time:    10:49             Narrative:    EXAMINATION:  CT ABDOMEN PELVIS WITH CONTRAST    CLINICAL HISTORY:  Bowel obstruction, high-grade;    TECHNIQUE:  Low dose axial images, sagittal and coronal reformations were obtained from the lung bases to the pubic symphysis following the IV administration of 100 mL of Omnipaque 350 .  Oral contrast was not given.    COMPARISON:  08/11/2018.    FINDINGS:  There are mild dependent atelectatic changes within the lung bases.  There is a calcified granuloma within the right lower lobe posteriorly.  The bones appear intact.  There is no evidence for acute fracture or bone destruction.  The liver is normal in size and is homogeneous in density with no focal abnormalities of the liver identified.  Patient is s/p cholecystectomy.  There is prominence of the extrahepatic common bile duct, however this is similar to the prior examination and is likely related to the patient's post cholecystectomy state.  There is a small hiatal hernia.  There is an enteric tube with the tip within the proximal stomach.  The remainder of the stomach appears grossly unremarkable.  The spleen and pancreas both appear grossly unremarkable.  There is bilateral adrenal gland thickening, similar to the prior examination.  There are bilateral renal hypodensities present with the largest located within the upper pole of the left kidney most consistent with bilateral renal cysts.  There is no evidence for hydronephrosis.  The abdominal aorta tapers normally without aneurysmal dilatation.  No para-aortic lymphadenopathy is identified.  The appendix is present and appears grossly unremarkable.  There are no dilated loops of bowel evident.  No ascites is identified.  The prostate gland does appear  prominent in size.  The urinary bladder is unremarkable.  There is no evidence for pelvic or inguinal lymphadenopathy.                               X-Ray Abdomen AP 1 View (KUB) (Final result)  Result time 08/06/19 09:38:49    Final result by Severino Holden MD (08/06/19 09:38:49)                 Impression:      As above      Electronically signed by: Severino Holden MD  Date:    08/06/2019  Time:    09:38             Narrative:    EXAMINATION:  XR ABDOMEN AP 1 VIEW    CLINICAL HISTORY:  Unspecified abdominal pain    TECHNIQUE:  Single AP View of the abdomen was performed.    COMPARISON:  08/11/2018    FINDINGS:  NG tube in place.  However the tip in proximal port or above the level of diaphragm.  This should be advanced on the order of 10 cm.    Cholecystectomy clips in right upper quadrant.    Bowel gas pattern appears nonobstructive.  No dilated loops of bowel identified.  No obvious free air.

## 2019-08-09 NOTE — PROGRESS NOTES
Fluids resumed as d/c postponed.  Pt reports pain 10/10.  Reporting heartburn; audible belching.  RN contacted pharmacy re phenobarb-hyoscy-atropine.  RN informed med unavailable and that Dr. Galvin is aware.

## 2019-08-09 NOTE — PROGRESS NOTES
[facility] Hospital Medicine  Progress Note    Patient Name: Emery Barragan  MRN: [id number]  Patient Class: IP- Inpatient   Admission Date: 8/6/2019  Length of Stay: 2 days  Attending Physician: Blas Galvin MD  Primary Care Provider: [facility]        Subjective:     Principal Problem:Esophagitis, [place] grade D      HPI:  58 year old male with PMH of HTN, DM, Hep C successfully treated and COPD presented to ED with c/o abdominal pain that began this morning. Stated abdominal pain is periumbilical and associated with nausea and vomiting. No hematemesis. Stated that he had similar symptoms in the past, and was told that he had a bowel obstruction. Last BM was yesterday. Reported stopped drinking alcohol secondary to Hep C diagnosis. Denies any fever, chills, sore throat, chest pain, CP, shortness of breath, diarrhea, and dysuria.  Conitinues to smoke a pack per day, denies illicit drug use. NGT was placed in ED d/t continued vomiting. ED eval afebrile, no WBC, electrolytes unremarkable, lipase mildly elevated. CT A/P no findings to suggest bowel obstruction. Tox screen presumptive pos for marijuana and opiates. Admitted to Observation.          Overview/Hospital Course:  58 year old male admitted with N/V and abdominal pain, CT A/P There are no findings to suggest bowel obstruction. tox screen positive for opioids marijuana. and Placed on IVF's, antiemetics PRN, still c/o pain. Repeat KUB no No dilated bowel loops to suggest obstruction.     EGD tomorrow    Interval History: no emesis   EGD this am                Findings:       LA Grade D (one or more mucosal breaks involving at least 75% of        esophageal circumference) esophagitis with no bleeding was found 26        to 41 cm from the incisors.       A medium-sized hiatal hernia was present with associated Kofi's        erosions.       Patchy mild inflammation characterized by erythema was found in the         gastric antrum. Biopsies were taken with a cold forceps for        histology.       The examined duodenum was normal.    Review of Systems   Constitutional: Positive for appetite change. Negative for chills and fever.   HENT: Negative for congestion and sore throat.    Eyes: Negative.    Respiratory: Negative for chest tightness and shortness of breath.    Cardiovascular: Negative for chest pain and leg swelling.   Gastrointestinal: Positive for abdominal pain and diarrhea (loose stools). Negative for abdominal distention, blood in stool, nausea and vomiting.   Endocrine: Negative.    Genitourinary: Negative for difficulty urinating and dysuria.   Musculoskeletal: Positive for back pain (chronic). Negative for arthralgias and neck pain.   Skin: Negative.    Neurological: Negative for dizziness, syncope, light-headedness and headaches.     Objective:     Vital Signs (Most Recent):  Temp: 99.3 °F (37.4 °C) (08/09/19 0916)  Pulse: 109 (08/09/19 0916)  Resp: (!) 28 (08/09/19 0916)  BP: 139/85 (08/09/19 0916)  SpO2: 95 % (08/09/19 0916) Vital Signs (24h Range):  Temp:  [97 °F (36.1 °C)-99.8 °F (37.7 °C)] 99.3 °F (37.4 °C)  Pulse:  [] 109  Resp:  [16-28] 28  SpO2:  [95 %-99 %] 95 %  BP: (139-193)/() 139/85     Weight: 85.6 kg (188 lb 11.4 oz)  Body mass index is 27.87 kg/m².    Intake/Output Summary (Last 24 hours) at 8/9/2019 0952  Last data filed at 8/9/2019 0900  Gross per 24 hour   Intake 3291 ml   Output 2950 ml   Net 341 ml      Physical Exam   Constitutional: He is oriented to person, place, and time. He appears well-developed and well-nourished. No distress.   HENT:   Head: Normocephalic and atraumatic.   Mouth/Throat: Oropharynx is clear and moist.   Eyes: Pupils are equal, round, and reactive to light. Conjunctivae are normal. No scleral icterus.   Neck: Normal range of motion. Neck supple.   Cardiovascular: Normal rate, regular rhythm, normal heart sounds and intact distal pulses.   No murmur  heard.  Pulmonary/Chest: Effort normal and breath sounds normal. No respiratory distress. He has no wheezes.   Abdominal: Soft. Bowel sounds are normal. He exhibits no distension. There is tenderness (mild periumbilical TTP). There is no rebound and no guarding.   Musculoskeletal: Normal range of motion. He exhibits no edema or tenderness.   Neurological: He is alert and oriented to person, place, and time.   Skin: Skin is warm and dry. Capillary refill takes less than 2 seconds. He is not diaphoretic.   Psychiatric: He has a normal mood and affect.   Nursing note and vitals reviewed.      Significant Labs:   CBC:   No results for input(s): WBC, HGB, HCT, PLT in the last 48 hours.  CMP:   Recent Labs   Lab 08/08/19  0500 08/09/19  0522   * 137   K 3.9 4.6    102   CO2 20* 23   * 194*   BUN 25* 25*   CREATININE 1.5* 1.4   CALCIUM 10.4 9.8   PROT 7.2  --    ALBUMIN 3.9  --    BILITOT 0.9  --    ALKPHOS 92  --    AST 32  --    ALT 19  --    ANIONGAP 15 12   EGFRNONAA 50* 54*     All pertinent labs within the past 24 hours have been reviewed.    Significant Imaging: I have reviewed all pertinent imaging results/findings within the past 24 hours.   Imaging Results          CT Abdomen Pelvis With Contrast (Final result)  Result time 08/06/19 10:49:33    Final result by Patrick Mayberry MD (08/06/19 10:49:33)                 Impression:      NG tube with the tip within the proximal stomach.  Note that no dilated loops of bowel are evident.  There are no findings to suggest bowel obstruction.    Small hiatal hernia.    S/p cholecystectomy.    Bilateral renal cysts.    Bilateral renal adrenal gland hyperplasia, stable.      Electronically signed by: Patrick Mayberry MD  Date:    08/06/2019  Time:    10:49             Narrative:    EXAMINATION:  CT ABDOMEN PELVIS WITH CONTRAST    CLINICAL HISTORY:  Bowel obstruction, high-grade;    TECHNIQUE:  Low dose axial images, sagittal and coronal reformations were  obtained from the lung bases to the pubic symphysis following the IV administration of 100 mL of Omnipaque 350 .  Oral contrast was not given.    COMPARISON:  08/11/2018.    FINDINGS:  There are mild dependent atelectatic changes within the lung bases.  There is a calcified granuloma within the right lower lobe posteriorly.  The bones appear intact.  There is no evidence for acute fracture or bone destruction.  The liver is normal in size and is homogeneous in density with no focal abnormalities of the liver identified.  Patient is s/p cholecystectomy.  There is prominence of the extrahepatic common bile duct, however this is similar to the prior examination and is likely related to the patient's post cholecystectomy state.  There is a small hiatal hernia.  There is an enteric tube with the tip within the proximal stomach.  The remainder of the stomach appears grossly unremarkable.  The spleen and pancreas both appear grossly unremarkable.  There is bilateral adrenal gland thickening, similar to the prior examination.  There are bilateral renal hypodensities present with the largest located within the upper pole of the left kidney most consistent with bilateral renal cysts.  There is no evidence for hydronephrosis.  The abdominal aorta tapers normally without aneurysmal dilatation.  No para-aortic lymphadenopathy is identified.  The appendix is present and appears grossly unremarkable.  There are no dilated loops of bowel evident.  No ascites is identified.  The prostate gland does appear prominent in size.  The urinary bladder is unremarkable.  There is no evidence for pelvic or inguinal lymphadenopathy.                               X-Ray Abdomen AP 1 View (KUB) (Final result)  Result time 08/06/19 09:38:49    Final result by Severino Holden MD (08/06/19 09:38:49)                 Impression:      As above      Electronically signed by: Severino Holden MD  Date:    08/06/2019  Time:    09:38             Narrative:     EXAMINATION:  XR ABDOMEN AP 1 VIEW    CLINICAL HISTORY:  Unspecified abdominal pain    TECHNIQUE:  Single AP View of the abdomen was performed.    COMPARISON:  08/11/2018    FINDINGS:  NG tube in place.  However the tip in proximal port or above the level of diaphragm.  This should be advanced on the order of 10 cm.    Cholecystectomy clips in right upper quadrant.    Bowel gas pattern appears nonobstructive.  No dilated loops of bowel identified.  No obvious free air.                                    Assessment/Plan:      * Esophagitis, [place] grade D    BID PPI    Gastritis  Bid PPI      Intractable vomiting with nausea  - CT A/P no evidence of obstruction, removed NGT  - tox screen presum pos for marijuana and opiods, although patient denied on admission, states he only uses occasionally; per EMR hx of opiod withdrawal, uses street meds to avoid   - possible gastroenteritis vs cannabis induced, possible opioid w/d (patient admitted he has been using opiods as outpatient)  - antiemetics PRN, antispasmodics  - advance diet as tolerated  - repeat KUB 8/8/2019 no obstruction  - EGD this AM - results as above.   -d/w Dr Flor      Tobacco abuse  - counseled on cessation      Essential hypertension  - elevated on admission   - resume home med: enalapril 20 mg, increase to 30 mg, was also on HCTZ, on hold  - cont amlodipine 10 mg, clonidine patch 0.2   - hydralazine PRN  - monitor      Type 2 diabetes mellitus, with long-term current use of insulin  - on glargine and novolog at home  - SSI/accuchecks  - A1c 7.1  - glucose increasing, dose 5 units now, start LA insulin 10 U qhs  - monitor           VTE Risk Mitigation (From admission, onward)        Ordered     Place sequential compression device  Until discontinued      08/06/19 1338     IP VTE LOW RISK PATIENT  Once      08/06/19 1338     Place sequential compression device  Until discontinued      08/06/19 1338            Home today  PID PPI  NO  Narcotics    Blas Galvin MD  Department of Hospital Medicine   [facility]

## 2019-08-10 VITALS
DIASTOLIC BLOOD PRESSURE: 106 MMHG | TEMPERATURE: 98 F | RESPIRATION RATE: 22 BRPM | SYSTOLIC BLOOD PRESSURE: 164 MMHG | BODY MASS INDEX: 27.95 KG/M2 | HEIGHT: 69 IN | WEIGHT: 188.69 LBS | OXYGEN SATURATION: 94 % | HEART RATE: 74 BPM

## 2019-08-10 LAB
ANION GAP SERPL CALC-SCNC: 8 MMOL/L (ref 8–16)
BUN SERPL-MCNC: 21 MG/DL (ref 6–20)
CALCIUM SERPL-MCNC: 9.1 MG/DL (ref 8.7–10.5)
CHLORIDE SERPL-SCNC: 104 MMOL/L (ref 95–110)
CO2 SERPL-SCNC: 24 MMOL/L (ref 23–29)
CREAT SERPL-MCNC: 1.1 MG/DL (ref 0.5–1.4)
EST. GFR  (AFRICAN AMERICAN): >60 ML/MIN/1.73 M^2
EST. GFR  (NON AFRICAN AMERICAN): >60 ML/MIN/1.73 M^2
GLUCOSE SERPL-MCNC: 181 MG/DL (ref 70–110)
POCT GLUCOSE: 161 MG/DL (ref 70–110)
POTASSIUM SERPL-SCNC: 3.6 MMOL/L (ref 3.5–5.1)
SODIUM SERPL-SCNC: 136 MMOL/L (ref 136–145)

## 2019-08-10 PROCEDURE — 80048 BASIC METABOLIC PNL TOTAL CA: CPT

## 2019-08-10 PROCEDURE — 99238 PR HOSPITAL DISCHARGE DAY,<30 MIN: ICD-10-PCS | Mod: ,,, | Performed by: INTERNAL MEDICINE

## 2019-08-10 PROCEDURE — 99238 HOSP IP/OBS DSCHRG MGMT 30/<: CPT | Mod: ,,, | Performed by: INTERNAL MEDICINE

## 2019-08-10 PROCEDURE — 25000003 PHARM REV CODE 250: Performed by: INTERNAL MEDICINE

## 2019-08-10 PROCEDURE — 36415 COLL VENOUS BLD VENIPUNCTURE: CPT

## 2019-08-10 RX ORDER — SUCRALFATE 1 G/10ML
1 SUSPENSION ORAL 3 TIMES DAILY
Status: DISCONTINUED | OUTPATIENT
Start: 2019-08-10 | End: 2019-08-10 | Stop reason: HOSPADM

## 2019-08-10 RX ORDER — DULOXETIN HYDROCHLORIDE 20 MG/1
20 CAPSULE, DELAYED RELEASE ORAL 2 TIMES DAILY
Qty: 60 CAPSULE | Refills: 3 | Status: SHIPPED | OUTPATIENT
Start: 2019-08-10 | End: 2022-04-05

## 2019-08-10 RX ORDER — PHENOBARBITAL, HYOSCYAMINE SULFATE, ATROPINE SULFATE AND SCOPOLAMINE HYDROBROMIDE .0194; .1037; 16.2; .0065 MG/1; MG/1; MG/1; MG/1
1 TABLET ORAL 3 TIMES DAILY PRN
Qty: 30 TABLET | Refills: 1 | Status: SHIPPED | OUTPATIENT
Start: 2019-08-10 | End: 2022-04-05

## 2019-08-10 RX ADMIN — ENALAPRIL MALEATE 30 MG: 20 TABLET ORAL at 08:08

## 2019-08-10 RX ADMIN — DULOXETINE HYDROCHLORIDE 20 MG: 20 CAPSULE, DELAYED RELEASE ORAL at 08:08

## 2019-08-10 RX ADMIN — HYOSCYAMINE SULFATE 0.12 MG: 0.12 TABLET ORAL at 08:08

## 2019-08-10 RX ADMIN — PANTOPRAZOLE SODIUM 40 MG: 40 TABLET, DELAYED RELEASE ORAL at 08:08

## 2019-08-10 RX ADMIN — AMLODIPINE BESYLATE 10 MG: 5 TABLET ORAL at 08:08

## 2019-08-10 RX ADMIN — CALCIUM CARBONATE (ANTACID) CHEW TAB 500 MG 500 MG: 500 CHEW TAB at 06:08

## 2019-08-10 RX ADMIN — SUCRALFATE 1 G: 1 SUSPENSION ORAL at 08:08

## 2019-08-10 RX ADMIN — METHOCARBAMOL 500 MG: 500 TABLET, FILM COATED ORAL at 08:08

## 2019-08-10 RX ADMIN — PHENOBARBITAL 16.2 MG: 20 ELIXIR ORAL at 08:08

## 2019-08-10 NOTE — SUBJECTIVE & OBJECTIVE
Interval History:   Pain unchanged - rates 10/10 ( but doesn't appear as severe as desacribed)    Discharge held yesterday due to pain      Review of Systems   Constitutional: Positive for appetite change. Negative for chills and fever.   HENT: Negative for congestion and sore throat.    Eyes: Negative.    Respiratory: Negative for chest tightness and shortness of breath.    Cardiovascular: Negative for chest pain and leg swelling.   Gastrointestinal: Positive for abdominal pain and diarrhea (loose stools). Negative for abdominal distention, blood in stool, nausea and vomiting.   Endocrine: Negative.    Genitourinary: Negative for difficulty urinating and dysuria.   Musculoskeletal: Positive for back pain (chronic). Negative for arthralgias and neck pain.   Skin: Negative.    Neurological: Negative for dizziness, syncope, light-headedness and headaches.     Objective:     Vital Signs (Most Recent):  Temp: 98.4 °F (36.9 °C) (08/10/19 0802)  Pulse: 80 (08/10/19 0802)  Resp: (!) 22 (08/10/19 0802)  BP: (!) 164/106 (08/10/19 0802)  SpO2: (!) 94 % (08/10/19 0802) Vital Signs (24h Range):  Temp:  [98 °F (36.7 °C)-100.6 °F (38.1 °C)] 98.4 °F (36.9 °C)  Pulse:  [] 80  Resp:  [18-30] 22  SpO2:  [93 %-96 %] 94 %  BP: (125-164)/() 164/106     Weight: 85.6 kg (188 lb 11.4 oz)  Body mass index is 27.87 kg/m².    Intake/Output Summary (Last 24 hours) at 8/10/2019 0925  Last data filed at 8/10/2019 0719  Gross per 24 hour   Intake 3657 ml   Output 1100 ml   Net 2557 ml      Physical Exam   Constitutional: He is oriented to person, place, and time. He appears well-developed and well-nourished. No distress.   HENT:   Head: Normocephalic and atraumatic.   Mouth/Throat: Oropharynx is clear and moist.   Eyes: Pupils are equal, round, and reactive to light. Conjunctivae are normal. No scleral icterus.   Neck: Normal range of motion. Neck supple.   Cardiovascular: Normal rate, regular rhythm, normal heart sounds and intact  distal pulses.   No murmur heard.  Pulmonary/Chest: Effort normal and breath sounds normal. No respiratory distress. He has no wheezes.   Abdominal: Soft. Bowel sounds are normal. He exhibits no distension. There is tenderness (mild periumbilical TTP). There is no rebound and no guarding.   Musculoskeletal: Normal range of motion. He exhibits no edema or tenderness.   Neurological: He is alert and oriented to person, place, and time.   Skin: Skin is warm and dry. Capillary refill takes less than 2 seconds. He is not diaphoretic.   Psychiatric: He has a normal mood and affect.   Nursing note and vitals reviewed.      Significant Labs:   CBC:   No results for input(s): WBC, HGB, HCT, PLT in the last 48 hours.  CMP:   Recent Labs   Lab 08/09/19  0522 08/10/19  0458    136   K 4.6 3.6    104   CO2 23 24   * 181*   BUN 25* 21*   CREATININE 1.4 1.1   CALCIUM 9.8 9.1   ANIONGAP 12 8   EGFRNONAA 54* >60     All pertinent labs within the past 24 hours have been reviewed.    Significant Imaging: I have reviewed all pertinent imaging results/findings within the past 24 hours.   Imaging Results          CT Abdomen Pelvis With Contrast (Final result)  Result time 08/06/19 10:49:33    Final result by Patrick Mayberry MD (08/06/19 10:49:33)                 Impression:      NG tube with the tip within the proximal stomach.  Note that no dilated loops of bowel are evident.  There are no findings to suggest bowel obstruction.    Small hiatal hernia.    S/p cholecystectomy.    Bilateral renal cysts.    Bilateral renal adrenal gland hyperplasia, stable.      Electronically signed by: Patrick Mayberry MD  Date:    08/06/2019  Time:    10:49             Narrative:    EXAMINATION:  CT ABDOMEN PELVIS WITH CONTRAST    CLINICAL HISTORY:  Bowel obstruction, high-grade;    TECHNIQUE:  Low dose axial images, sagittal and coronal reformations were obtained from the lung bases to the pubic symphysis following the IV administration  of 100 mL of Omnipaque 350 .  Oral contrast was not given.    COMPARISON:  08/11/2018.    FINDINGS:  There are mild dependent atelectatic changes within the lung bases.  There is a calcified granuloma within the right lower lobe posteriorly.  The bones appear intact.  There is no evidence for acute fracture or bone destruction.  The liver is normal in size and is homogeneous in density with no focal abnormalities of the liver identified.  Patient is s/p cholecystectomy.  There is prominence of the extrahepatic common bile duct, however this is similar to the prior examination and is likely related to the patient's post cholecystectomy state.  There is a small hiatal hernia.  There is an enteric tube with the tip within the proximal stomach.  The remainder of the stomach appears grossly unremarkable.  The spleen and pancreas both appear grossly unremarkable.  There is bilateral adrenal gland thickening, similar to the prior examination.  There are bilateral renal hypodensities present with the largest located within the upper pole of the left kidney most consistent with bilateral renal cysts.  There is no evidence for hydronephrosis.  The abdominal aorta tapers normally without aneurysmal dilatation.  No para-aortic lymphadenopathy is identified.  The appendix is present and appears grossly unremarkable.  There are no dilated loops of bowel evident.  No ascites is identified.  The prostate gland does appear prominent in size.  The urinary bladder is unremarkable.  There is no evidence for pelvic or inguinal lymphadenopathy.                               X-Ray Abdomen AP 1 View (KUB) (Final result)  Result time 08/06/19 09:38:49    Final result by Severino Holden MD (08/06/19 09:38:49)                 Impression:      As above      Electronically signed by: Severino Holden MD  Date:    08/06/2019  Time:    09:38             Narrative:    EXAMINATION:  XR ABDOMEN AP 1 VIEW    CLINICAL HISTORY:  Unspecified abdominal  pain    TECHNIQUE:  Single AP View of the abdomen was performed.    COMPARISON:  08/11/2018    FINDINGS:  NG tube in place.  However the tip in proximal port or above the level of diaphragm.  This should be advanced on the order of 10 cm.    Cholecystectomy clips in right upper quadrant.    Bowel gas pattern appears nonobstructive.  No dilated loops of bowel identified.  No obvious free air.

## 2019-08-10 NOTE — PROGRESS NOTES
[facility] Hospital Medicine  Progress Note    Patient Name: Emery Barragan  MRN: [id number]  Patient Class: IP- Inpatient   Admission Date: 8/6/2019  Length of Stay: 3 days  Attending Physician: Blas Galvin MD  Primary Care Provider: [employer] [facility]        Subjective:     Principal Problem:Esophagitis, [place] grade D      HPI:  58 year old male with PMH of HTN, DM, Hep C successfully treated and COPD presented to ED with c/o abdominal pain that began this morning. Stated abdominal pain is periumbilical and associated with nausea and vomiting. No hematemesis. Stated that he had similar symptoms in the past, and was told that he had a bowel obstruction. Last BM was yesterday. Reported stopped drinking alcohol secondary to Hep C diagnosis. Denies any fever, chills, sore throat, chest pain, CP, shortness of breath, diarrhea, and dysuria.  Conitinues to smoke a pack per day, denies illicit drug use. NGT was placed in ED d/t continued vomiting. ED eval afebrile, no WBC, electrolytes unremarkable, lipase mildly elevated. CT A/P no findings to suggest bowel obstruction. Tox screen presumptive pos for marijuana and opiates. Admitted to Observation.          Overview/Hospital Course:  58 year old male admitted with N/V and abdominal pain, CT A/P There are no findings to suggest bowel obstruction. tox screen positive for opioids marijuana. and Placed on IVF's, antiemetics PRN, still c/o pain. Repeat KUB no No dilated bowel loops to suggest obstruction.     EGD tomorrow    Interval History:   Pain unchanged - rates 10/10 ( but doesn't appear as severe as desacribed)    Discharge held yesterday due to pain      Review of Systems   Constitutional: Positive for appetite change. Negative for chills and fever.   HENT: Negative for congestion and sore throat.    Eyes: Negative.    Respiratory: Negative for chest tightness and shortness of breath.    Cardiovascular: Negative for chest  pain and leg swelling.   Gastrointestinal: Positive for abdominal pain and diarrhea (loose stools). Negative for abdominal distention, blood in stool, nausea and vomiting.   Endocrine: Negative.    Genitourinary: Negative for difficulty urinating and dysuria.   Musculoskeletal: Positive for back pain (chronic). Negative for arthralgias and neck pain.   Skin: Negative.    Neurological: Negative for dizziness, syncope, light-headedness and headaches.     Objective:     Vital Signs (Most Recent):  Temp: 98.4 °F (36.9 °C) (08/10/19 0802)  Pulse: 80 (08/10/19 0802)  Resp: (!) 22 (08/10/19 0802)  BP: (!) 164/106 (08/10/19 0802)  SpO2: (!) 94 % (08/10/19 0802) Vital Signs (24h Range):  Temp:  [98 °F (36.7 °C)-100.6 °F (38.1 °C)] 98.4 °F (36.9 °C)  Pulse:  [] 80  Resp:  [18-30] 22  SpO2:  [93 %-96 %] 94 %  BP: (125-164)/() 164/106     Weight: 85.6 kg (188 lb 11.4 oz)  Body mass index is 27.87 kg/m².    Intake/Output Summary (Last 24 hours) at 8/10/2019 0925  Last data filed at 8/10/2019 0719  Gross per 24 hour   Intake 3657 ml   Output 1100 ml   Net 2557 ml      Physical Exam   Constitutional: He is oriented to person, place, and time. He appears well-developed and well-nourished. No distress.   HENT:   Head: Normocephalic and atraumatic.   Mouth/Throat: Oropharynx is clear and moist.   Eyes: Pupils are equal, round, and reactive to light. Conjunctivae are normal. No scleral icterus.   Neck: Normal range of motion. Neck supple.   Cardiovascular: Normal rate, regular rhythm, normal heart sounds and intact distal pulses.   No murmur heard.  Pulmonary/Chest: Effort normal and breath sounds normal. No respiratory distress. He has no wheezes.   Abdominal: Soft. Bowel sounds are normal. He exhibits no distension. There is tenderness (mild periumbilical TTP). There is no rebound and no guarding.   Musculoskeletal: Normal range of motion. He exhibits no edema or tenderness.   Neurological: He is alert and oriented to  person, place, and time.   Skin: Skin is warm and dry. Capillary refill takes less than 2 seconds. He is not diaphoretic.   Psychiatric: He has a normal mood and affect.   Nursing note and vitals reviewed.      Significant Labs:   CBC:   No results for input(s): WBC, HGB, HCT, PLT in the last 48 hours.  CMP:   Recent Labs   Lab 08/09/19  0522 08/10/19  0458    136   K 4.6 3.6    104   CO2 23 24   * 181*   BUN 25* 21*   CREATININE 1.4 1.1   CALCIUM 9.8 9.1   ANIONGAP 12 8   EGFRNONAA 54* >60     All pertinent labs within the past 24 hours have been reviewed.    Significant Imaging: I have reviewed all pertinent imaging results/findings within the past 24 hours.   Imaging Results          CT Abdomen Pelvis With Contrast (Final result)  Result time 08/06/19 10:49:33    Final result by Patrick Mayberry MD (08/06/19 10:49:33)                 Impression:      NG tube with the tip within the proximal stomach.  Note that no dilated loops of bowel are evident.  There are no findings to suggest bowel obstruction.    Small hiatal hernia.    S/p cholecystectomy.    Bilateral renal cysts.    Bilateral renal adrenal gland hyperplasia, stable.      Electronically signed by: Patrick Mayberry MD  Date:    08/06/2019  Time:    10:49             Narrative:    EXAMINATION:  CT ABDOMEN PELVIS WITH CONTRAST    CLINICAL HISTORY:  Bowel obstruction, high-grade;    TECHNIQUE:  Low dose axial images, sagittal and coronal reformations were obtained from the lung bases to the pubic symphysis following the IV administration of 100 mL of Omnipaque 350 .  Oral contrast was not given.    COMPARISON:  08/11/2018.    FINDINGS:  There are mild dependent atelectatic changes within the lung bases.  There is a calcified granuloma within the right lower lobe posteriorly.  The bones appear intact.  There is no evidence for acute fracture or bone destruction.  The liver is normal in size and is homogeneous in density with no focal  abnormalities of the liver identified.  Patient is s/p cholecystectomy.  There is prominence of the extrahepatic common bile duct, however this is similar to the prior examination and is likely related to the patient's post cholecystectomy state.  There is a small hiatal hernia.  There is an enteric tube with the tip within the proximal stomach.  The remainder of the stomach appears grossly unremarkable.  The spleen and pancreas both appear grossly unremarkable.  There is bilateral adrenal gland thickening, similar to the prior examination.  There are bilateral renal hypodensities present with the largest located within the upper pole of the left kidney most consistent with bilateral renal cysts.  There is no evidence for hydronephrosis.  The abdominal aorta tapers normally without aneurysmal dilatation.  No para-aortic lymphadenopathy is identified.  The appendix is present and appears grossly unremarkable.  There are no dilated loops of bowel evident.  No ascites is identified.  The prostate gland does appear prominent in size.  The urinary bladder is unremarkable.  There is no evidence for pelvic or inguinal lymphadenopathy.                               X-Ray Abdomen AP 1 View (KUB) (Final result)  Result time 08/06/19 09:38:49    Final result by Severino Holden MD (08/06/19 09:38:49)                 Impression:      As above      Electronically signed by: Severino Holden MD  Date:    08/06/2019  Time:    09:38             Narrative:    EXAMINATION:  XR ABDOMEN AP 1 VIEW    CLINICAL HISTORY:  Unspecified abdominal pain    TECHNIQUE:  Single AP View of the abdomen was performed.    COMPARISON:  08/11/2018    FINDINGS:  NG tube in place.  However the tip in proximal port or above the level of diaphragm.  This should be advanced on the order of 10 cm.    Cholecystectomy clips in right upper quadrant.    Bowel gas pattern appears nonobstructive.  No dilated loops of bowel identified.  No obvious free air.                                     Assessment/Plan:      * Esophagitis, [place] grade D    BID PPI    Gastritis  Bid PPI      Intractable vomiting with nausea  - CT A/P no evidence of obstruction, removed NGT  - tox screen presum pos for marijuana and opiods, although patient denied on admission, states he only uses occasionally; per EMR hx of opiod withdrawal, uses street meds to avoid   - possible gastroenteritis vs cannabis induced, possible opioid w/d (patient admitted he has been using opiods as outpatient)  - antiemetics PRN, antispasmodics  - advance diet as tolerated  - repeat KUB 8/8/2019 no obstruction  - EGD this AM - results as above.   -d/w Dr Flor      Tobacco abuse  - counseled on cessation      Essential hypertension  - elevated on admission   - resume home med: enalapril 20 mg, increase to 30 mg, was also on HCTZ, on hold  - cont amlodipine 10 mg, clonidine patch 0.2   - hydralazine PRN  - monitor      Type 2 diabetes mellitus, with long-term current use of insulin  - on glargine and novolog at home  - SSI/accuchecks  - A1c 7.1  - glucose increasing, dose 5 units now, start LA insulin 10 U qhs  - monitor           VTE Risk Mitigation (From admission, onward)        Ordered     Place sequential compression device  Until discontinued      08/06/19 1338     IP VTE LOW RISK PATIENT  Once      08/06/19 1338     Place sequential compression device  Until discontinued      08/06/19 1338            Overall impression remains one of a function abdominal pain syndrome.  Cont PPI  Added cymbalta and columba Galvin MD  Department of Hospital Medicine   [facility]

## 2019-08-10 NOTE — PLAN OF CARE
Problem: Fall Injury Risk  Goal: Absence of Fall and Fall-Related Injury  Outcome: Ongoing (interventions implemented as appropriate)  Pt free from falls/injury this shift. Ambulating to toilet independently.    Problem: Adult Inpatient Plan of Care  Goal: Plan of Care Review  Outcome: Ongoing (interventions implemented as appropriate)  Plan of care reviewed with Pt, purposeful hourly rounding performed. VSS on RA. Tele maintained, SR. IVF as ordered. NAD during shift. Bed locked and lowered, call light in reach. Will continue to monitor.    Problem: Diabetes Comorbidity  Goal: Blood Glucose Level Within Desired Range  Outcome: Ongoing (interventions implemented as appropriate)  Nightly glucose level required supplemental insulin.

## 2019-08-13 ENCOUNTER — PATIENT OUTREACH (OUTPATIENT)
Dept: ADMINISTRATIVE | Facility: CLINIC | Age: 59
End: 2019-08-13

## 2019-08-13 NOTE — PATIENT INSTRUCTIONS
Esophagitis  Do you often have burning pain in your chest? You may have esophagitis. This is an inflammation of the lining of the esophagus. The esophagus is the tube that connects the throat to the stomach. This sheet tells you more about esophagitis. It also explains your treatment options.  Two Main Types of Esophagitis  Reflux esophagitis. This is the more common type. Its also called GERD (gastroesophageal reflux disease). Stomach contents with stomach acide rise up into the esophagus. This happens repeatedly and leads to irritation. Causes include:  Being overweight  Pregnancy  Frequent vomiting  Certain medications (such as aspirin and other anti-inflammatories)  Hiatal hernia, which is when the upper part of the stomach pushes upward through the diaphragm (the muscle between the chest and the abdomen)  Infectious esophagitis. This condition is caused by an infection. Certain factors can make this more likely. These include a weakened immune system and poor nutrition. Antibiotic use can also be a factor. The infection is often due to the following:  A type of fungus (typically candida)  A virus, such as herpes simplex virus 1 or cytomegalovirus (CMV)  Symptoms of Esophagitis  The following symptoms can occur for both types of esophagitis:  Pain when swallowing, or trouble swallowing  Heartburn (pain behind the breastbone)  Nausea  Bleeding (indicated by bright red vomit or black, tarry stool)  These symptoms occur more often with reflux esophagitis:  Coughing  Hoarseness  Diagnosis of Esophagitis  Your healthcare provider will ask about your health history and symptoms. Youll also be examined. Sometimes certain tests are needed. These may include:  Upper endoscopy. A thin, flexible tube with a tiny light and camera is used. It is inserted through the mouth down into the esophagus. This allows the doctor to look for damage. A biopsy may also be done. This is when a small sample of tissue is removed. The  sample is sent to a lab for testing.  Upper GI x-ray with barium. An x-ray is done after the patient drinks a substance called barium. This substance makes problems in the esophagus easier to see on an x-ray.  Treatment of Esophagitis   Medications can help treat either type. For infectious esophagitis, medications can help clear the infection. For reflux esophagitis, medications are prescribed based on symptoms. For instance, minor symptoms can be treated with antacids. These are taken after meals and at bedtime. For more severe symptoms, certain medications can help reduce the amount of acid the stomach makes. Other medications can help food move through the stomach more quickly.  Lifestyle changes can help reduce irritation and ease symptoms:  Avoid spicy foods (pepper, chili powder, lindsay). Also avoid hard foods (nuts, crackers, raw vegetables) and acidic foods and drinks (tomatoes, citrus fruits and juices). Other problem foods include chocolate, peppermint, nutmeg, and foods high in fat.  Until you can swallow without pain, follow a combined liquid and soft diet. Try foods such as cooked cereals, mashed potatoes, and soups.  Take small bites and chew your food thoroughly.  Avoid large meals and heavy evening meals. Don't lie down within 2 to 3 hours of eating..  Get to or maintain a healthy weight.  Avoid alcohol, caffeine, and smoking.  Practice good oral hygiene.  Raise your upper body by 4 to 6 inches when lying in bed. This can be done using a foam wedge. Or put blocks under the legs at the head of your bed.  Surgery may be needed for severe reflux esophagitis. Your doctor can tell you more.    Why Treatment Is Important  Without treatment, esophagitis can worsen. This is especially true with severe reflux esophagitis. For instance, continued symptoms can cause scarring of the esophagus. Over time, this can create a stricture, or narrowing. This can result in trouble passing food down to the stomach.  Continued symptoms can also cause changes in the lining of the esophagus. These changes can put you at a slightly higher risk of cancer of the esophagus.   © 0288-0861 [employer], [address] [place] [address], [place] [address]. All rights reserved. This information is not intended as a substitute for professional medical care. Always follow your healthcare professional's instructions.

## 2019-08-15 NOTE — PHYSICIAN QUERY
PT Name: Emery Barragan  MR #: [id number]    Physician Query Form - Pathology Findings Clarification     CDS: Zhane FOWLER,RN        Contact information:[phone]  This form is a permanent document in the medical record.     Query Date: August 14, 2019      By submitting this query, we are merely seeking further clarification of documentation.  Please utilize your independent clinical judgment when addressing the question(s) below.      The medical record contains the following:     Findings Supporting Clinical Information Location in Medical Record               Chronic Gastritis     SPECIMEN  1) Gastric biopsies  FINAL PATHOLOGIC DIAGNOSIS  Stomach, biopsy:  Gastric fundic and antral mucosa with inactive chronic gastritis and focal intestinal metaplasia (complete type).  Immunostain for H pylori is negative for organisms.  Negative for dysplasia or malignancy.               8/14/19 Pathology Report       Please document the clinical significance of the Pathologists findings of ___Chronic Gastritis____________________.    [ X  ] I agree with the Pathology Findings   [   ] I do not agree with the Pathology Findings   [   ] Other/Clarification of Findings:   [  ] Clinically Undetermined       Please document in your progress notes daily for the duration of treatment until resolved and include in your discharge summary.

## 2019-08-19 ENCOUNTER — HOSPITAL ENCOUNTER (OUTPATIENT)
Facility: HOSPITAL | Age: 59
Discharge: HOME | End: 2019-08-20
Attending: EMERGENCY MEDICINE | Admitting: INTERNAL MEDICINE
Payer: MEDICARE

## 2019-08-19 DIAGNOSIS — K20.90 ESOPHAGITIS: ICD-10-CM

## 2019-08-19 DIAGNOSIS — E11.65 TYPE 2 DIABETES MELLITUS WITH HYPERGLYCEMIA, WITH LONG-TERM CURRENT USE OF INSULIN: ICD-10-CM

## 2019-08-19 DIAGNOSIS — F12.188 CANNABIS HYPEREMESIS SYNDROME CONCURRENT WITH AND DUE TO CANNABIS ABUSE: ICD-10-CM

## 2019-08-19 DIAGNOSIS — R10.9 ABDOMINAL PAIN: ICD-10-CM

## 2019-08-19 DIAGNOSIS — R11.2 INTRACTABLE VOMITING WITH NAUSEA, UNSPECIFIED VOMITING TYPE: Primary | ICD-10-CM

## 2019-08-19 DIAGNOSIS — Z79.4 TYPE 2 DIABETES MELLITUS WITH HYPERGLYCEMIA, WITH LONG-TERM CURRENT USE OF INSULIN: ICD-10-CM

## 2019-08-19 DIAGNOSIS — R11.2 INTRACTABLE VOMITING WITH NAUSEA: ICD-10-CM

## 2019-08-19 LAB
ALBUMIN SERPL BCP-MCNC: 3.7 G/DL (ref 3.5–5.2)
ALLENS TEST: ABNORMAL
ALP SERPL-CCNC: 86 U/L (ref 55–135)
ALT SERPL W/O P-5'-P-CCNC: 33 U/L (ref 10–44)
AMPHET+METHAMPHET UR QL: NEGATIVE
ANION GAP SERPL CALC-SCNC: 15 MMOL/L (ref 8–16)
AST SERPL-CCNC: 24 U/L (ref 10–40)
BACTERIA #/AREA URNS AUTO: ABNORMAL /HPF
BARBITURATES UR QL SCN>200 NG/ML: NORMAL
BASOPHILS # BLD AUTO: 0.06 K/UL (ref 0–0.2)
BASOPHILS NFR BLD: 0.3 % (ref 0–1.9)
BENZODIAZ UR QL SCN>200 NG/ML: NEGATIVE
BILIRUB SERPL-MCNC: 0.3 MG/DL (ref 0.1–1)
BILIRUB UR QL STRIP: NEGATIVE
BUN SERPL-MCNC: 18 MG/DL (ref 6–20)
BUN SERPL-MCNC: 19 MG/DL (ref 6–30)
BZE UR QL SCN: NEGATIVE
CALCIUM SERPL-MCNC: 9.6 MG/DL (ref 8.7–10.5)
CANNABINOIDS UR QL SCN: NORMAL
CHLORIDE SERPL-SCNC: 101 MMOL/L (ref 95–110)
CHLORIDE SERPL-SCNC: 105 MMOL/L (ref 95–110)
CLARITY UR REFRACT.AUTO: CLEAR
CO2 SERPL-SCNC: 20 MMOL/L (ref 23–29)
COLOR UR AUTO: YELLOW
CREAT SERPL-MCNC: 1.2 MG/DL (ref 0.5–1.4)
CREAT SERPL-MCNC: 1.3 MG/DL (ref 0.5–1.4)
CREAT UR-MCNC: 86 MG/DL (ref 23–375)
DIFFERENTIAL METHOD: ABNORMAL
EOSINOPHIL # BLD AUTO: 0 K/UL (ref 0–0.5)
EOSINOPHIL NFR BLD: 0.1 % (ref 0–8)
ERYTHROCYTE [DISTWIDTH] IN BLOOD BY AUTOMATED COUNT: 13.2 % (ref 11.5–14.5)
EST. GFR  (AFRICAN AMERICAN): >60 ML/MIN/1.73 M^2
EST. GFR  (NON AFRICAN AMERICAN): 59.3 ML/MIN/1.73 M^2
ETHANOL UR-MCNC: <10 MG/DL
GLUCOSE SERPL-MCNC: 284 MG/DL (ref 70–110)
GLUCOSE SERPL-MCNC: 293 MG/DL (ref 70–110)
GLUCOSE UR QL STRIP: ABNORMAL
HCO3 UR-SCNC: 24 MMOL/L (ref 24–28)
HCT VFR BLD AUTO: 49.5 % (ref 40–54)
HCT VFR BLD CALC: 51 %PCV (ref 36–54)
HGB BLD-MCNC: 16.7 G/DL (ref 14–18)
HGB UR QL STRIP: ABNORMAL
HYALINE CASTS UR QL AUTO: 1 /LPF
IMM GRANULOCYTES # BLD AUTO: 0.09 K/UL (ref 0–0.04)
IMM GRANULOCYTES NFR BLD AUTO: 0.5 % (ref 0–0.5)
KETONES UR QL STRIP: ABNORMAL
LACTATE SERPL-SCNC: 2.4 MMOL/L (ref 0.5–2.2)
LDH SERPL L TO P-CCNC: 1.33 MMOL/L (ref 0.5–2.2)
LEUKOCYTE ESTERASE UR QL STRIP: NEGATIVE
LIPASE SERPL-CCNC: 86 U/L (ref 4–60)
LYMPHOCYTES # BLD AUTO: 1.6 K/UL (ref 1–4.8)
LYMPHOCYTES NFR BLD: 9.3 % (ref 18–48)
MAGNESIUM SERPL-MCNC: 1.7 MG/DL (ref 1.6–2.6)
MCH RBC QN AUTO: 28.7 PG (ref 27–31)
MCHC RBC AUTO-ENTMCNC: 33.7 G/DL (ref 32–36)
MCV RBC AUTO: 85 FL (ref 82–98)
METHADONE UR QL SCN>300 NG/ML: NEGATIVE
MICROSCOPIC COMMENT: ABNORMAL
MONOCYTES # BLD AUTO: 0.7 K/UL (ref 0.3–1)
MONOCYTES NFR BLD: 3.7 % (ref 4–15)
NEUTROPHILS # BLD AUTO: 15.2 K/UL (ref 1.8–7.7)
NEUTROPHILS NFR BLD: 86.1 % (ref 38–73)
NITRITE UR QL STRIP: NEGATIVE
NRBC BLD-RTO: 0 /100 WBC
OPIATES UR QL SCN: NORMAL
PCO2 BLDA: 38 MMHG (ref 35–45)
PCP UR QL SCN>25 NG/ML: NEGATIVE
PH SMN: 7.41 [PH] (ref 7.35–7.45)
PH UR STRIP: 7 [PH] (ref 5–8)
PLATELET # BLD AUTO: 282 K/UL (ref 150–350)
PMV BLD AUTO: 11.3 FL (ref 9.2–12.9)
PO2 BLDA: 51 MMHG (ref 40–60)
POC BE: -1 MMOL/L
POC IONIZED CALCIUM: 1.2 MMOL/L (ref 1.06–1.42)
POC SATURATED O2: 86 % (ref 95–100)
POC TCO2 (MEASURED): 23 MMOL/L (ref 23–29)
POC TCO2: 25 MMOL/L (ref 24–29)
POCT GLUCOSE: 164 MG/DL (ref 70–110)
POCT GLUCOSE: 240 MG/DL (ref 70–110)
POCT GLUCOSE: 295 MG/DL (ref 70–110)
POTASSIUM BLD-SCNC: 3.2 MMOL/L (ref 3.5–5.1)
POTASSIUM SERPL-SCNC: 3.6 MMOL/L (ref 3.5–5.1)
PROT SERPL-MCNC: 7.2 G/DL (ref 6–8.4)
PROT UR QL STRIP: ABNORMAL
RBC # BLD AUTO: 5.81 M/UL (ref 4.6–6.2)
RBC #/AREA URNS AUTO: 5 /HPF (ref 0–4)
SAMPLE: ABNORMAL
SAMPLE: ABNORMAL
SITE: ABNORMAL
SODIUM BLD-SCNC: 141 MMOL/L (ref 136–145)
SODIUM SERPL-SCNC: 140 MMOL/L (ref 136–145)
SP GR UR STRIP: 1.01 (ref 1–1.03)
SQUAMOUS #/AREA URNS AUTO: 0 /HPF
TOXICOLOGY INFORMATION: NORMAL
TROPONIN I SERPL DL<=0.01 NG/ML-MCNC: 0.02 NG/ML (ref 0–0.03)
URN SPEC COLLECT METH UR: ABNORMAL
WBC # BLD AUTO: 17.66 K/UL (ref 3.9–12.7)
WBC #/AREA URNS AUTO: 1 /HPF (ref 0–5)
YEAST UR QL AUTO: ABNORMAL

## 2019-08-19 PROCEDURE — 85025 COMPLETE CBC W/AUTO DIFF WBC: CPT

## 2019-08-19 PROCEDURE — 93005 ELECTROCARDIOGRAM TRACING: CPT

## 2019-08-19 PROCEDURE — 93010 EKG 12-LEAD: ICD-10-PCS | Mod: ,,, | Performed by: INTERNAL MEDICINE

## 2019-08-19 PROCEDURE — 96361 HYDRATE IV INFUSION ADD-ON: CPT

## 2019-08-19 PROCEDURE — 83735 ASSAY OF MAGNESIUM: CPT

## 2019-08-19 PROCEDURE — 63600175 PHARM REV CODE 636 W HCPCS: Performed by: PHYSICIAN ASSISTANT

## 2019-08-19 PROCEDURE — 96374 THER/PROPH/DIAG INJ IV PUSH: CPT

## 2019-08-19 PROCEDURE — 81001 URINALYSIS AUTO W/SCOPE: CPT

## 2019-08-19 PROCEDURE — G0378 HOSPITAL OBSERVATION PER HR: HCPCS

## 2019-08-19 PROCEDURE — 83605 ASSAY OF LACTIC ACID: CPT

## 2019-08-19 PROCEDURE — 63600175 PHARM REV CODE 636 W HCPCS: Performed by: EMERGENCY MEDICINE

## 2019-08-19 PROCEDURE — 99285 EMERGENCY DEPT VISIT HI MDM: CPT | Mod: 25

## 2019-08-19 PROCEDURE — 99219 PR INITIAL OBSERVATION CARE,LEVL II: CPT | Mod: ,,, | Performed by: PHYSICIAN ASSISTANT

## 2019-08-19 PROCEDURE — 80307 DRUG TEST PRSMV CHEM ANLYZR: CPT

## 2019-08-19 PROCEDURE — A4216 STERILE WATER/SALINE, 10 ML: HCPCS | Performed by: PHYSICIAN ASSISTANT

## 2019-08-19 PROCEDURE — 25000003 PHARM REV CODE 250: Performed by: PHYSICIAN ASSISTANT

## 2019-08-19 PROCEDURE — 80053 COMPREHEN METABOLIC PANEL: CPT

## 2019-08-19 PROCEDURE — 83690 ASSAY OF LIPASE: CPT

## 2019-08-19 PROCEDURE — 83605 ASSAY OF LACTIC ACID: CPT | Mod: 91

## 2019-08-19 PROCEDURE — 82962 GLUCOSE BLOOD TEST: CPT

## 2019-08-19 PROCEDURE — 96375 TX/PRO/DX INJ NEW DRUG ADDON: CPT

## 2019-08-19 PROCEDURE — 84484 ASSAY OF TROPONIN QUANT: CPT

## 2019-08-19 PROCEDURE — 93010 ELECTROCARDIOGRAM REPORT: CPT | Mod: ,,, | Performed by: INTERNAL MEDICINE

## 2019-08-19 PROCEDURE — 99285 EMERGENCY DEPT VISIT HI MDM: CPT | Mod: ,,, | Performed by: EMERGENCY MEDICINE

## 2019-08-19 PROCEDURE — 25500020 PHARM REV CODE 255: Performed by: EMERGENCY MEDICINE

## 2019-08-19 PROCEDURE — 99219 PR INITIAL OBSERVATION CARE,LEVL II: ICD-10-PCS | Mod: ,,, | Performed by: PHYSICIAN ASSISTANT

## 2019-08-19 PROCEDURE — 80047 BASIC METABLC PNL IONIZED CA: CPT | Mod: 59

## 2019-08-19 PROCEDURE — 99285 PR EMERGENCY DEPT VISIT,LEVEL V: ICD-10-PCS | Mod: ,,, | Performed by: EMERGENCY MEDICINE

## 2019-08-19 RX ORDER — ACETAMINOPHEN 325 MG/1
650 TABLET ORAL EVERY 6 HOURS PRN
Status: DISCONTINUED | OUTPATIENT
Start: 2019-08-19 | End: 2019-08-20 | Stop reason: HOSPADM

## 2019-08-19 RX ORDER — INSULIN ASPART 100 [IU]/ML
0-5 INJECTION, SOLUTION INTRAVENOUS; SUBCUTANEOUS
Status: DISCONTINUED | OUTPATIENT
Start: 2019-08-19 | End: 2019-08-20 | Stop reason: HOSPADM

## 2019-08-19 RX ORDER — IBUPROFEN 200 MG
16 TABLET ORAL
Status: DISCONTINUED | OUTPATIENT
Start: 2019-08-19 | End: 2019-08-20 | Stop reason: HOSPADM

## 2019-08-19 RX ORDER — LATANOPROST 50 UG/ML
1 SOLUTION/ DROPS OPHTHALMIC NIGHTLY
Status: DISCONTINUED | OUTPATIENT
Start: 2019-08-19 | End: 2019-08-20 | Stop reason: HOSPADM

## 2019-08-19 RX ORDER — IBUPROFEN 200 MG
24 TABLET ORAL
Status: DISCONTINUED | OUTPATIENT
Start: 2019-08-19 | End: 2019-08-20 | Stop reason: HOSPADM

## 2019-08-19 RX ORDER — DICYCLOMINE HYDROCHLORIDE 20 MG/1
20 TABLET ORAL
Status: DISCONTINUED | OUTPATIENT
Start: 2019-08-19 | End: 2019-08-20 | Stop reason: HOSPADM

## 2019-08-19 RX ORDER — AMLODIPINE BESYLATE 10 MG/1
10 TABLET ORAL DAILY
Status: DISCONTINUED | OUTPATIENT
Start: 2019-08-19 | End: 2019-08-20 | Stop reason: HOSPADM

## 2019-08-19 RX ORDER — ONDANSETRON 8 MG/1
8 TABLET, ORALLY DISINTEGRATING ORAL EVERY 8 HOURS PRN
Status: DISCONTINUED | OUTPATIENT
Start: 2019-08-19 | End: 2019-08-20 | Stop reason: HOSPADM

## 2019-08-19 RX ORDER — SODIUM CHLORIDE 0.9 % (FLUSH) 0.9 %
3 SYRINGE (ML) INJECTION EVERY 8 HOURS
Status: DISCONTINUED | OUTPATIENT
Start: 2019-08-19 | End: 2019-08-20 | Stop reason: HOSPADM

## 2019-08-19 RX ORDER — PHENOBARBITAL, HYOSCYAMINE SULFATE, ATROPINE SULFATE AND SCOPOLAMINE HYDROBROMIDE .0194; .1037; 16.2; .0065 MG/1; MG/1; MG/1; MG/1
1 TABLET ORAL 3 TIMES DAILY PRN
Status: DISCONTINUED | OUTPATIENT
Start: 2019-08-19 | End: 2019-08-19

## 2019-08-19 RX ORDER — GLUCAGON 1 MG
1 KIT INJECTION
Status: DISCONTINUED | OUTPATIENT
Start: 2019-08-19 | End: 2019-08-20 | Stop reason: HOSPADM

## 2019-08-19 RX ORDER — ONDANSETRON 2 MG/ML
8 INJECTION INTRAMUSCULAR; INTRAVENOUS
Status: COMPLETED | OUTPATIENT
Start: 2019-08-19 | End: 2019-08-19

## 2019-08-19 RX ORDER — HALOPERIDOL 5 MG/ML
5 INJECTION INTRAMUSCULAR
Status: COMPLETED | OUTPATIENT
Start: 2019-08-19 | End: 2019-08-19

## 2019-08-19 RX ORDER — MORPHINE SULFATE 2 MG/ML
6 INJECTION, SOLUTION INTRAMUSCULAR; INTRAVENOUS
Status: COMPLETED | OUTPATIENT
Start: 2019-08-19 | End: 2019-08-19

## 2019-08-19 RX ORDER — FAMOTIDINE 20 MG/1
20 TABLET, FILM COATED ORAL
Status: COMPLETED | OUTPATIENT
Start: 2019-08-19 | End: 2019-08-19

## 2019-08-19 RX ORDER — PANTOPRAZOLE SODIUM 40 MG/1
40 TABLET, DELAYED RELEASE ORAL 2 TIMES DAILY
Status: DISCONTINUED | OUTPATIENT
Start: 2019-08-19 | End: 2019-08-20 | Stop reason: HOSPADM

## 2019-08-19 RX ORDER — ONDANSETRON 2 MG/ML
4 INJECTION INTRAMUSCULAR; INTRAVENOUS EVERY 8 HOURS PRN
Status: DISCONTINUED | OUTPATIENT
Start: 2019-08-19 | End: 2019-08-20 | Stop reason: HOSPADM

## 2019-08-19 RX ORDER — SUCRALFATE 1 G/10ML
1 SUSPENSION ORAL
Status: COMPLETED | OUTPATIENT
Start: 2019-08-19 | End: 2019-08-19

## 2019-08-19 RX ADMIN — IOHEXOL 100 ML: 350 INJECTION, SOLUTION INTRAVENOUS at 09:08

## 2019-08-19 RX ADMIN — SODIUM CHLORIDE 1000 ML: 0.9 INJECTION, SOLUTION INTRAVENOUS at 08:08

## 2019-08-19 RX ADMIN — SUCRALFATE 1 G: 1 SUSPENSION ORAL at 12:08

## 2019-08-19 RX ADMIN — INSULIN ASPART 2 UNITS: 100 INJECTION, SOLUTION INTRAVENOUS; SUBCUTANEOUS at 01:08

## 2019-08-19 RX ADMIN — DICYCLOMINE HYDROCHLORIDE 20 MG: 20 TABLET ORAL at 03:08

## 2019-08-19 RX ADMIN — ONDANSETRON 8 MG: 2 INJECTION INTRAMUSCULAR; INTRAVENOUS at 08:08

## 2019-08-19 RX ADMIN — HALOPERIDOL LACTATE 5 MG: 5 INJECTION INTRAMUSCULAR at 10:08

## 2019-08-19 RX ADMIN — AMLODIPINE BESYLATE 10 MG: 10 TABLET ORAL at 12:08

## 2019-08-19 RX ADMIN — Medication 3 ML: at 02:08

## 2019-08-19 RX ADMIN — ALUMINUM HYDROXIDE, MAGNESIUM HYDROXIDE, AND SIMETHICONE 50 ML: 200; 200; 20 SUSPENSION ORAL at 12:08

## 2019-08-19 RX ADMIN — ALUMINUM HYDROXIDE, MAGNESIUM HYDROXIDE, AND SIMETHICONE 50 ML: 200; 200; 20 SUSPENSION ORAL at 11:08

## 2019-08-19 RX ADMIN — ONDANSETRON 8 MG: 8 TABLET, ORALLY DISINTEGRATING ORAL at 04:08

## 2019-08-19 RX ADMIN — MORPHINE SULFATE 6 MG: 2 INJECTION, SOLUTION INTRAMUSCULAR; INTRAVENOUS at 09:08

## 2019-08-19 RX ADMIN — LATANOPROST 1 DROP: 50 SOLUTION OPHTHALMIC at 08:08

## 2019-08-19 RX ADMIN — FAMOTIDINE 20 MG: 20 TABLET, FILM COATED ORAL at 12:08

## 2019-08-19 RX ADMIN — DICYCLOMINE HYDROCHLORIDE 20 MG: 20 TABLET ORAL at 08:08

## 2019-08-19 RX ADMIN — Medication 3 ML: at 08:08

## 2019-08-19 RX ADMIN — PANTOPRAZOLE SODIUM 40 MG: 40 TABLET, DELAYED RELEASE ORAL at 08:08

## 2019-08-19 RX ADMIN — ALUMINUM HYDROXIDE, MAGNESIUM HYDROXIDE, AND SIMETHICONE 50 ML: 200; 200; 20 SUSPENSION ORAL at 04:08

## 2019-08-19 NOTE — ASSESSMENT & PLAN NOTE
-  on admit  - Last A1c 7.1 on 8/7/2019  - Patient endorses noncompliance with home insulin  - Sugar free CLD, ADAT  - Low dose SSI while limited PO intake  - Monitor BGs and adjust insulin PRN

## 2019-08-19 NOTE — PLAN OF CARE
Problem: Adult Inpatient Plan of Care  Goal: Plan of Care Review  Outcome: Ongoing (interventions implemented as appropriate)  POC reviewed with patient. VSS. Pt c/o nausea, PRN meds given per orders. No emesis episodes during shift. Poor appetite. Pt remains free from falls and trauma. Call light in reach. WCTM.

## 2019-08-19 NOTE — HPI
"58 year old male with a PMHx of HTN and DM2 presenting to the ER for abdominal pain and N/V. Patient with fluctuations in HPI. Patient reports acute onset of abdominal pain this morning upon awakening that was "everywhere." He reports 10/10 pain at that time, now improved to 5/10. He reports associated nausea and nonbloody emesis. He reports persistent vomiting since this morning; he is unable to report how many episodes. Patient reports similar symptoms last week when he was hospitalized at [facility]. Patient reports he has not filled medications he was discharged with. Of note, patient reports marijuana use, tobacco abuse, and taking "leftover Vicodin." He denies CP, SOB, hematemesis, hematochezia, melena, dysuria/frequency, headache, dizziness, focal weakness, and fever/chills.        Hypertensive on admission. Labs show WBC 17, chronic CKD, , lipase 86, lactic acid 2.4->1.3. CT abdomen pelvis without acute intraabdominal process.  "

## 2019-08-19 NOTE — ASSESSMENT & PLAN NOTE
"Cannabis hyperemesis syndrome  [place] Grade D Esophagitis  - Hypertensive on admission in setting of emesis and noncompliance with home medications; patient is asymptomatic  - WBC 17 likely 2/2 emesis; remains afebrile  - Lipase 86  - Lactic acid 2.4->1.3  - Benign abdominal exam  - CT abdomen pelvis without acute intraabdominal process  - Utox persistently positive for THC and opioids; patient endorses THC use and taking "leftover Vicodin"; no opioid Rx in 2 years  - Ordered repeat tox screen  - Patient recently admitted to [facility] for similar complaints; CT abdomen pelvis without acute process; EGD performed with revealed [place] Grade D reflux esophagitis. Per note "Pt continues to report "10/10"abdominal pain  Although objectively  Several times I have peeked in on him he is sleeping."  - Patient endorses noncompliance with home medications, including medications he was discharged with on 8/10  - Long discussion regarding concern for opioid dependency and cannabis hyperemesis syndrome. Discussed avoidance of opioids during admission. Patient voiced understanding.   - Continue PPI BID   - CLD, ADAT  - Supportive care, antiemetics PRN, GI cocktail PRN, bentyl  "

## 2019-08-19 NOTE — ASSESSMENT & PLAN NOTE
- Asymptomatic hypertension on admission in setting of emesis and noncompliance with home medications  - Resume home norvasc  - Held enalapril and HCTZ in setting of emesis  - Continue to monitor

## 2019-08-19 NOTE — ED TRIAGE NOTES
Pt was discharged one week ago from [facility] with complaints of abd pain  Pt had abd pain last night and awakened this morning nausea and vomiting

## 2019-08-19 NOTE — SUBJECTIVE & OBJECTIVE
Past Medical History:   Diagnosis Date    Chronic back pain     Diabetes mellitus     Gallstones     Glaucoma (increased eye pressure)     Hepatitis C     Hypertension        Past Surgical History:   Procedure Laterality Date    CHOLECYSTECTOMY      COLONOSCOPY N/A 4/27/2016    Performed by [facility]    ESOPHAGOGASTRODUODENOSCOPY (EGD) N/A 8/9/2019    Performed by Mike Garcia [facility] at [facility]    POLYPECTOMY N/A 4/27/2016    Performed by Andre [facility]       Review of patient's allergies indicates:  No Known Allergies    No current facility-administered medications on file prior to encounter.      Current Outpatient Medications on File Prior to Encounter   Medication Sig    amlodipine (NORVASC) 10 MG tablet Take 10 mg by mouth once daily.    atropine-phenobarbital-scopolamine-hyoscyamine 16.2-0.1037 -0.0194 mg Tab Take 1 tablet by mouth 3 (three) times daily as needed (abdominal pain).    BD INSULIN SYRINGE ULTRA-FINE 0.5 mL 31 gauge x 5/16 Syrg INJECT 3 TIMES A DAY AS DIRECTED    calcium carbonate (TUMS) 200 mg calcium (500 mg) chewable tablet Take 2 tablets by mouth once daily.    enalapril (VASOTEC) 20 MG tablet Take 20 mg by mouth once daily.    folic acid/multivit-min/lutein (CENTRUM SILVER ORAL) Take 1 tablet by mouth once daily.    hydrochlorothiazide (MICROZIDE) 12.5 mg capsule Take 12.5 mg by mouth once daily.    insulin glargine (LANTUS) 100 unit/mL injection Inject 40 Units into the skin every morning.     latanoprost 0.005 % ophthalmic solution Place 1 drop into both eyes every evening.    ondansetron (ZOFRAN) 4 MG tablet Take 1 tablet (4 mg total) by mouth every 6 (six) hours as needed for Nausea.    ONETOUCH ULTRA TEST Strp TEST TWICE DAILY.    pantoprazole (PROTONIX) 40 MG tablet Take 1 tablet (40 mg total) by mouth 2 (two) times daily.    DULoxetine (CYMBALTA) 20 MG capsule Take 1 capsule (20 mg total) by mouth 2 (two) times  daily.    insulin lispro 100 unit/mL injection Sliding Scale starting at 2 units    promethazine (PHENERGAN) 25 MG tablet Take 1 tablet (25 mg total) by mouth every 6 (six) hours as needed for Nausea (vomiting).     Family History     Problem Relation (Age of Onset)    Diabetes Mother    Heart disease Mother    Kidney disease Mother        Tobacco Use    Smoking status: Current Every Day Smoker     Packs/day: 1.00     Types: Cigarettes    Smokeless tobacco: Never Used   Substance and Sexual Activity    Alcohol use: No    Drug use: No    Sexual activity: Yes     Partners: Female     Review of Systems   Constitutional: Negative for chills, diaphoresis, fatigue and fever.   HENT: Negative for congestion, hearing loss, sinus pressure, sore throat and trouble swallowing.    Respiratory: Negative for chest tightness, shortness of breath and wheezing.    Cardiovascular: Negative for chest pain, palpitations and leg swelling.   Gastrointestinal: Positive for abdominal pain, nausea and vomiting. Negative for abdominal distention, blood in stool and diarrhea.   Genitourinary: Negative for difficulty urinating, dysuria, flank pain and hematuria.   Musculoskeletal: Negative for back pain, gait problem and myalgias.   Skin: Negative for pallor and rash.   Neurological: Negative for dizziness, syncope, speech difficulty, weakness, numbness and headaches.   Psychiatric/Behavioral: Negative for agitation, behavioral problems, confusion and dysphoric mood. The patient is not nervous/anxious.      Objective:     Vital Signs (Most Recent):  Temp: 98.9 °F (37.2 °C) (08/19/19 1220)  Pulse: 80 (08/19/19 1220)  Resp: 16 (08/19/19 1220)  BP: (!) 150/98 (08/19/19 1220)  SpO2: 96 % (08/19/19 1220) Vital Signs (24h Range):  Temp:  [98.5 °F (36.9 °C)-99.5 °F (37.5 °C)] 98.9 °F (37.2 °C)  Pulse:  [] 80  Resp:  [16-24] 16  SpO2:  [95 %-99 %] 96 %  BP: (150-201)/() 150/98     Weight: 81.2 kg (179 lb)  Body mass index is 26.43  kg/m².    Physical Exam   Constitutional: He is oriented to person, place, and time. He appears well-developed and well-nourished. No distress.   Patient resting comfortably with eyes closed upon entering room   HENT:   Head: Normocephalic and atraumatic.   Eyes: Conjunctivae and EOM are normal. Right eye exhibits no discharge. Left eye exhibits no discharge. No scleral icterus.   Neck: Normal range of motion. Neck supple. No JVD present. No tracheal deviation present.   Cardiovascular: Normal rate, regular rhythm, normal heart sounds and intact distal pulses.   Pulmonary/Chest: Effort normal and breath sounds normal. No respiratory distress. He has no wheezes.   Abdominal: Soft. Bowel sounds are normal. He exhibits no distension. There is no tenderness.   Musculoskeletal: Normal range of motion. He exhibits no edema or tenderness.   Neurological: He is alert and oriented to person, place, and time. No cranial nerve deficit.   Skin: Skin is warm and dry. He is not diaphoretic. No erythema.   Psychiatric: He has a normal mood and affect. His behavior is normal.         CRANIAL NERVES     CN III, IV, VI   Extraocular motions are normal.        Significant Labs: All pertinent labs within the past 24 hours have been reviewed.    Significant Imaging: I have reviewed all pertinent imaging results/findings within the past 24 hours.

## 2019-08-19 NOTE — ED PROVIDER NOTES
Encounter Date: 8/19/2019       History     Chief Complaint   Patient presents with    Abdominal Pain     vomiting, diaphoretic, just dc'd sat     HPI   58-year-old male with a history of hypertension, diabetes, hepatitis-C presenting with sudden onset abdominal pain associated nausea and vomiting that began this morning at 5:30 a.m..  States that the abdominal pain is periumbilical, in the epigastrium associated with nausea and vomiting. He has been vomiting nonstop since 5:30 a.m., associated with 8/10 nonradiating abdominal pain. Denies any hematemesis, hematochezia, melena, hemoptysis, lightheadedness or dizziness.  He had similar symptoms last week, and was admitted to rule out bowel obstruction.  Last BM was yesterday, small amount and loose.  He denies any acute alcohol use, drug use or smoking.  Onset was sudden, associated with nominal pain, nonradiating, rated 8/10, associated with nausea and vomiting. Denies any chest pain, lightheadedness, dizziness, back pain, leg swelling.    Review of patient's allergies indicates:  No Known Allergies  Past Medical History:   Diagnosis Date    Chronic back pain     Diabetes mellitus     Gallstones     Glaucoma (increased eye pressure)     Hepatitis C     Hypertension      Past Surgical History:   Procedure Laterality Date    CHOLECYSTECTOMY      COLONOSCOPY N/A 4/27/2016    [facility]    ESOPHAGOGASTRODUODENOSCOPY (EGD) N/A 8/9/2019    Performed by Mike [facility]    POLYPECTOMY N/A 4/27/2016    Performed by [facility]     Family History   Problem Relation Age of Onset    Diabetes Mother     Kidney disease Mother     Heart disease Mother      Social History     Tobacco Use    Smoking status: Current Every Day Smoker     Packs/day: 1.00     Types: Cigarettes    Smokeless tobacco: Never Used   Substance Use Topics    Alcohol use: No    Drug use: No     Review of Systems    Constitutional: Positive for diaphoresis. Negative for chills and fever.   HENT: Negative for congestion and sore throat.    Eyes: Negative for visual disturbance.   Respiratory: Negative for chest tightness and shortness of breath.    Cardiovascular: Negative for chest pain and palpitations.   Gastrointestinal: Positive for abdominal pain, nausea and vomiting. Negative for abdominal distention, blood in stool, constipation and diarrhea.   Endocrine: Negative for polyphagia and polyuria.   Genitourinary: Negative for flank pain and testicular pain.   Musculoskeletal: Negative for arthralgias, back pain, neck pain and neck stiffness.   Skin: Negative for color change and wound.   Neurological: Negative for syncope, facial asymmetry, weakness, light-headedness, numbness and headaches.   Psychiatric/Behavioral: Negative for agitation and confusion.       Physical Exam     Initial Vitals [08/19/19 0835]   BP Pulse Resp Temp SpO2   (!) 179/116 88 (!) 24 98.9 °F (37.2 °C) 97 %      MAP       --         Physical Exam  Constitutional: Well-developed. Well-nourished. Minimal emotional distress.  HENT: OP clear and moist.  NECK: Supple. No cervical LAD.  CARDIAC: RRR. Normal S1/ S2. No murmurs, gallops or rubs. 2+ distal pulses.  PULM: Normal effort. Breath sounds clear - no wheezes, rales, rhonchi.  ABD: Soft, non-tender, non-distended, normal BS. Negative Whitney sign, no McBurney's point tenderness. No guarding, no rebound.  : No CVA tenderness.   RECTAL: deferred  MS: Full ROM. No edema.  NEURO: Alert. Moving all extremities purposefully.  SKIN: Warm and dry. No rash or lesions.  PSYCH: Normal judgment. Normal affect.    ED Course   Procedures  Labs Reviewed   CBC W/ AUTO DIFFERENTIAL - Abnormal; Notable for the following components:       Result Value    WBC 17.66 (*)     Gran # (ANC) 15.2 (*)     Immature Grans (Abs) 0.09 (*)     Gran% 86.1 (*)     Lymph% 9.3 (*)     Mono% 3.7 (*)     All other components within  normal limits   COMPREHENSIVE METABOLIC PANEL - Abnormal; Notable for the following components:    CO2 20 (*)     Glucose 293 (*)     eGFR if non  59.3 (*)     All other components within normal limits   LACTIC ACID, PLASMA - Abnormal; Notable for the following components:    Lactate (Lactic Acid) 2.4 (*)     All other components within normal limits   LIPASE - Abnormal; Notable for the following components:    Lipase 86 (*)     All other components within normal limits   URINALYSIS, REFLEX TO URINE CULTURE - Abnormal; Notable for the following components:    Protein, UA 3+ (*)     Glucose, UA 3+ (*)     Ketones, UA Trace (*)     Occult Blood UA 1+ (*)     All other components within normal limits    Narrative:     Preferred Collection Type->Urine, Clean Catch   URINALYSIS MICROSCOPIC - Abnormal; Notable for the following components:    RBC, UA 5 (*)     All other components within normal limits    Narrative:     Preferred Collection Type->Urine, Clean Catch   ISTAT PROCEDURE - Abnormal; Notable for the following components:    POC Glucose 284 (*)     POC Potassium 3.2 (*)     All other components within normal limits   POCT GLUCOSE - Abnormal; Notable for the following components:    POCT Glucose 295 (*)     All other components within normal limits   ISTAT PROCEDURE - Abnormal; Notable for the following components:    POC SATURATED O2 86 (*)     All other components within normal limits   MAGNESIUM   TROPONIN I   TOXICOLOGY SCREEN, URINE, RANDOM (COMPLIANCE)   TOXICOLOGY SCREEN, URINE, RANDOM (COMPLIANCE)    Narrative:     ADD ON TOX SCREEN PER DR VIKAS ALBARRAN  08/19/2019  12:19   Preferred Collection Type->Urine, Clean Catch     EKG Readings: (Independently Interpreted)   Initial Reading: No STEMI. Previous EKG: Compared with most recent EKG Rhythm: Normal Sinus Rhythm. Ectopy: No Ectopy. ST Segments: Normal ST Segments. T Waves: Normal.   Heart rate 77, normal sinus rhythm, no significant  change from previous ECG     ECG Results          EKG 12-lead (Final result)  Result time 08/19/19 17:27:43    Final result by Interface, Lab In [facility] (08/19/19 17:27:43)                 Narrative:    Test Reason : R10.9,    Vent. Rate : 077 BPM     Atrial Rate : 077 BPM     P-R Int : 158 ms          QRS Dur : 088 ms      QT Int : 378 ms       P-R-T Axes : 010 016 043 degrees     QTc Int : 427 ms    Normal sinus rhythm  Normal ECG  When compared with ECG of 08-AUG-2019 00:25,  No significant change was found  Confirmed by SON LOERA MD ([id number]) on 8/19/2019 5:27:32 PM    Referred By: System System           Confirmed By:SON LOERA MD                            Imaging Results          CT Abdomen Pelvis With Contrast (Final result)  Result time 08/19/19 10:55:43    Final result by Gumaro French MD (08/19/19 10:55:43)                 Impression:      1. No acute intra-abdominal abnormality.  2. 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  3. Cholecystectomy.  4. Stable mild thickening of the bilateral adrenal glands, could be normal variant or hypertrophy.  5. Small hiatal hernia.  6. Additional findings as above.    Electronically signed by resident: Luke Rice  Date:    08/19/2019  Time:    10:04    Electronically signed by: Gumaro French MD  Date:    08/19/2019  Time:    10:55             Narrative:    EXAMINATION:  CT ABDOMEN PELVIS WITH CONTRAST    CLINICAL HISTORY:  Abdominal distension    TECHNIQUE:  Routine axial CT images of the abdomen and pelvis were obtained after administration 100cc of IV Omnipaque 350.  Coronal and Sagittal reformatted images were also obtained.    COMPARISON:  08/06/2019    FINDINGS:  Heart: Normal in Size.  No pericardial effusion.    Lungs: Mild dependent atelectasis.  Calcified granuloma right lower lobe.  No focal consolidation or  significant pleural fluid.    Liver: Normal in size and attenuation, with no focal hepatic lesions.    Gallbladder: Cholecystectomy.    Bile ducts: Prominent common bile duct at 9-10 mm likely reflecting cholecystectomy status.    Pancreas: No mass or peripancreatic fat stranding.    Spleen: Normal.    Adrenals: Stable mild thickening of the bilateral adrenal glands.    GI Tract/ Mesentery: Small hiatal hernia.  No evidence of bowel obstruction or inflammation. Appendix is normal caliber without inflammatory changes.    Kidneys/ Ureters: Normal in size and location.  Small bilateral simple cysts.  Additional small subcentimeter renal hypodensities which are too small to characterize.  No hydronephrosis or nephrolithiasis. No ureteral dilatation.    Bladder: No evidence of wall thickening.    Reproductive organs: Bilateral testicular hydrocele.    Retroperitoneum: No significant adenopathy.    Peritoneal space: No ascites. No free air.    Abdominal wall: Surgical clips within the midline ventral abdominal wall.  Tiny fat containing umbilical hernia.    Vasculature: No significant atherosclerosis of the aorta. Small dissection flap the celiac artery with dilatation distal to this region measuring 1.3 cm, finding was likely present on study dated 2016.  Stable aneurysmal dilatation of the right common iliac artery proximal to the bifurcation measuring 2 cm.    Bones: No acute fracture. Age-appropriate degenerative changes.                                 Medical Decision Making:   History:   I obtained history from: someone other than patient.       <> Summary of History: Significant other  Old Medical Records: I decided to obtain old medical records.  Old Records Summarized: records from clinic visits and records from previous admission(s).  Initial Assessment:   58-year-old male with a history of hypertension, diabetes, hepatitis-C presenting with sudden onset abdominal pain associated nausea and vomiting that began  this morning at 5:30 a.m.  Differential Diagnosis:   Differential diagnosis includes but is not limited to:  Obstruction, perforation, obstipation, ACS, mesenteric ischemia, internal hernia, inguinal hernia, pancreatitis, cyclic vomiting syndrome, opiate withdrawal  Independently Interpreted Test(s):   I have ordered and independently interpreted X-rays - see prior notes.  I have ordered and independently interpreted EKG Reading(s) - see prior notes  Clinical Tests:   Lab Tests: Ordered and Reviewed  Radiological Study: Ordered and Reviewed  Medical Tests: Ordered and Reviewed  Sepsis Perfusion Assessment: I attest, a sepsis perfusion exam was performed within 6 hours of Septic Shock presentation, following fluid resuscitation.  ED Management:  ECG  Cardiac and telemetry monitoring  IV Zofran, IV morphine  IV, IV fluids  Labs, UA  CT of the abdomen/pelvis    Other:   I have discussed this case with another health care provider.       <> Summary of the Discussion: Hospital medicine  Emergent evaluation of patient presenting with acute onset abdominal pain associated with intractable nausea and vomiting. On arrival he is tachypneic, hypertensive, afebrile without tachycardia.  Physical exam findings remarkable for non peritoneal abdominal exam, however appears to have pain without proportion.  No inguinal hernia, testicular exam without acute findings.  Persistent nausea and vomiting, ECG obtained with no signs of ischemia or STEMI on my read.  He is hypertensive however without rales, doubt acute pulmonary edema. IV line placed, labs were drawn, patient given IV Zofran, IV morphine for pain, and placed on cardiac and telemetry monitoring.  On re-evaluation continues to have abdominal pain persistent nausea and vomiting. CT scan of the abdomen/pelvis obtained without acute intra-abdominal process.  Labs positive for significant leukocytosis of 17,000, and lactic acidosis. Patient has had a history of urinary tox  positive for THC and opioids, and endorses THC use and using leftover Vicodin.  He recently had an EGD performed with grade D reflux esophagitis.  Nonbloody emesis, doubt acute varices or Kelin-Salcido tear.  However if he continues to vomit retch, may be concern for [place] versus Kelin-Salcido.  Patient was given antiemetic, GI cocktail, supportive care with PPI.  Doubt ACS, PE, aortic dissection, or area clean aneurysm based on exam, history and imaging.  Patient does have a history of tobacco abuse, declined nicotine patch, educated on tobacco cessation follow-up.  Patient agreeable with observation plan, given inability to tolerate p.o. and ongoing intractable nausea and vomiting.    Complexity:  High - level 5                    Clinical Impression:       ICD-10-CM ICD-9-CM   1. Intractable vomiting with nausea, unspecified vomiting type R11.2 536.2   2. Abdominal pain R10.9 789.00   3. Esophagitis K20.9 530.10   4. Type 2 diabetes mellitus with hyperglycemia, with long-term current use of insulin E11.65 250.00    Z79.4 790.29     V58.67   5. Cannabis hyperemesis syndrome concurrent with and due to cannabis abuse F12.188 536.2     305.20   6. Intractable vomiting with nausea R11.2 536.2         Disposition:   Disposition: Placed in Observation  Condition: Fair         Hieu Cohn DO  Dept of Emergency Medicine   [facility]  Spectralink: [id number]                 Hieu Cohn DO  08/21/19 0647

## 2019-08-19 NOTE — ED NOTES
Pt identifiers Emery Barragan were checked and are correct  LOC: The patient is awake, alert, aware of environment with an appropriate affect. Oriented x4, speaking appropriately  APPEARANCE: Pt resting comfortably, in no acute distress, pt is clean and well groomed, clothing properly fastened  SKIN: Skin warm, dry and intact, normal skin turgor, moist mucus membranes  RESPIRATORY: Airway is open and patent, respirations are spontaneous, even and unlabored, normal effort and rate Breath sounds clear miriam to all lung fields on auscultation  CARDIAC: Normal rate and rhythm, no peripheral edema noted, capillary refill < 3 seconds, bilateral radial pulses 2+  ABDOMEN: slightly firm to tuoch  Bowel sounds faintly present to right lower quad abd   NEUROLOGIC: PERRL, facial expression is symmetrical, patient moving all extremities spontaneously, normal sensation in all extremities when touched with a finger.  Follows all commands appropriately  MUSCULOSKELETAL: No obvious deformities.

## 2019-08-19 NOTE — H&P
"[facility] Hospital Medicine  History & Physical    Patient Name: Emery Barragan  MRN: [id number]  Admission Date: 8/19/2019  Attending Physician: Val Govea MD   Primary Care Provider: [facility]    [facility] Medicine Team: [facility] Anamika Mcmillan PA-C     Patient information was obtained from patient, past medical records and ER records.     Subjective:     Principal Problem:Intractable vomiting with nausea    Chief Complaint:   Chief Complaint   Patient presents with    Abdominal Pain     vomiting, diaphoretic, just dc'd sat        HPI: 58 year old male with a PMHx of HTN and DM2 presenting to the ER for abdominal pain and N/V. Patient with fluctuations in HPI. Patient reports acute onset of abdominal pain this morning upon awakening that was "everywhere." He reports 10/10 pain at that time, now improved to 5/10. He reports associated nausea and nonbloody emesis. He reports persistent vomiting since this morning; he is unable to report how many episodes. Patient reports similar symptoms last week when he was hospitalized at [facility]. Patient reports he has not filled medications he was discharged with. Of note, patient reports marijuana use, tobacco abuse, and taking "leftover Vicodin." He denies CP, SOB, hematemesis, hematochezia, melena, dysuria/frequency, headache, dizziness, focal weakness, and fever/chills.        Hypertensive on admission. Labs show WBC 17, chronic CKD, , lipase 86, lactic acid 2.4->1.3. CT abdomen pelvis without acute intraabdominal process.    Past Medical History:   Diagnosis Date    Chronic back pain     Diabetes mellitus     Gallstones     Glaucoma (increased eye pressure)     Hepatitis C     Hypertension        Past Surgical History:   Procedure Laterality Date    CHOLECYSTECTOMY      COLONOSCOPY N/A 4/27/2016    Performed by Andre [facility] at [facility]    ESOPHAGOGASTRODUODENOSCOPY (EGD) N/A 8/9/2019    " Performed by Mike Garcia MD at [facility] ENDO    POLYPECTOMY N/A 4/27/2016    Performed by Andre Sanchez MD at [facility] ENDO       Review of patient's allergies indicates:  No Known Allergies    No current facility-administered medications on file prior to encounter.      Current Outpatient Medications on File Prior to Encounter   Medication Sig    amlodipine (NORVASC) 10 MG tablet Take 10 mg by mouth once daily.    atropine-phenobarbital-scopolamine-hyoscyamine 16.2-0.1037 -0.0194 mg Tab Take 1 tablet by mouth 3 (three) times daily as needed (abdominal pain).    BD INSULIN SYRINGE ULTRA-FINE 0.5 mL 31 gauge x 5/16 Syrg INJECT 3 TIMES A DAY AS DIRECTED    calcium carbonate (TUMS) 200 mg calcium (500 mg) chewable tablet Take 2 tablets by mouth once daily.    enalapril (VASOTEC) 20 MG tablet Take 20 mg by mouth once daily.    folic acid/multivit-min/lutein (CENTRUM SILVER ORAL) Take 1 tablet by mouth once daily.    hydrochlorothiazide (MICROZIDE) 12.5 mg capsule Take 12.5 mg by mouth once daily.    insulin glargine (LANTUS) 100 unit/mL injection Inject 40 Units into the skin every morning.     latanoprost 0.005 % ophthalmic solution Place 1 drop into both eyes every evening.    ondansetron (ZOFRAN) 4 MG tablet Take 1 tablet (4 mg total) by mouth every 6 (six) hours as needed for Nausea.    ONETOUCH ULTRA TEST Strp TEST TWICE DAILY.    pantoprazole (PROTONIX) 40 MG tablet Take 1 tablet (40 mg total) by mouth 2 (two) times daily.    DULoxetine (CYMBALTA) 20 MG capsule Take 1 capsule (20 mg total) by mouth 2 (two) times daily.    insulin lispro 100 unit/mL injection Sliding Scale starting at 2 units    promethazine (PHENERGAN) 25 MG tablet Take 1 tablet (25 mg total) by mouth every 6 (six) hours as needed for Nausea (vomiting).     Family History     Problem Relation (Age of Onset)    Diabetes Mother    Heart disease Mother    Kidney disease Mother        Tobacco Use    Smoking status: Current Every  Day Smoker     Packs/day: 1.00     Types: Cigarettes    Smokeless tobacco: Never Used   Substance and Sexual Activity    Alcohol use: No    Drug use: No    Sexual activity: Yes     Partners: Female     Review of Systems   Constitutional: Negative for chills, diaphoresis, fatigue and fever.   HENT: Negative for congestion, hearing loss, sinus pressure, sore throat and trouble swallowing.    Respiratory: Negative for chest tightness, shortness of breath and wheezing.    Cardiovascular: Negative for chest pain, palpitations and leg swelling.   Gastrointestinal: Positive for abdominal pain, nausea and vomiting. Negative for abdominal distention, blood in stool and diarrhea.   Genitourinary: Negative for difficulty urinating, dysuria, flank pain and hematuria.   Musculoskeletal: Negative for back pain, gait problem and myalgias.   Skin: Negative for pallor and rash.   Neurological: Negative for dizziness, syncope, speech difficulty, weakness, numbness and headaches.   Psychiatric/Behavioral: Negative for agitation, behavioral problems, confusion and dysphoric mood. The patient is not nervous/anxious.      Objective:     Vital Signs (Most Recent):  Temp: 98.9 °F (37.2 °C) (08/19/19 1220)  Pulse: 80 (08/19/19 1220)  Resp: 16 (08/19/19 1220)  BP: (!) 150/98 (08/19/19 1220)  SpO2: 96 % (08/19/19 1220) Vital Signs (24h Range):  Temp:  [98.5 °F (36.9 °C)-99.5 °F (37.5 °C)] 98.9 °F (37.2 °C)  Pulse:  [] 80  Resp:  [16-24] 16  SpO2:  [95 %-99 %] 96 %  BP: (150-201)/() 150/98     Weight: 81.2 kg (179 lb)  Body mass index is 26.43 kg/m².    Physical Exam   Constitutional: He is oriented to person, place, and time. He appears well-developed and well-nourished. No distress.   Patient resting comfortably with eyes closed upon entering room   HENT:   Head: Normocephalic and atraumatic.   Eyes: Conjunctivae and EOM are normal. Right eye exhibits no discharge. Left eye exhibits no discharge. No scleral icterus.   Neck:  "Normal range of motion. Neck supple. No JVD present. No tracheal deviation present.   Cardiovascular: Normal rate, regular rhythm, normal heart sounds and intact distal pulses.   Pulmonary/Chest: Effort normal and breath sounds normal. No respiratory distress. He has no wheezes.   Abdominal: Soft. Bowel sounds are normal. He exhibits no distension. There is no tenderness.   Musculoskeletal: Normal range of motion. He exhibits no edema or tenderness.   Neurological: He is alert and oriented to person, place, and time. No cranial nerve deficit.   Skin: Skin is warm and dry. He is not diaphoretic. No erythema.   Psychiatric: He has a normal mood and affect. His behavior is normal.         CRANIAL NERVES     CN III, IV, VI   Extraocular motions are normal.        Significant Labs: All pertinent labs within the past 24 hours have been reviewed.    Significant Imaging: I have reviewed all pertinent imaging results/findings within the past 24 hours.    Assessment/Plan:     * Intractable vomiting with nausea  Cannabis hyperemesis syndrome  LA Grade D Esophagitis  - Hypertensive on admission in setting of emesis and noncompliance with home medications; patient is asymptomatic  - WBC 17 likely 2/2 emesis; remains afebrile  - Lipase 86  - Lactic acid 2.4->1.3  - Benign abdominal exam  - CT abdomen pelvis without acute intraabdominal process  - Utox persistently positive for THC and opioids; patient endorses THC use and taking "leftover Vicodin"; no opioid Rx in 2 years  - Ordered repeat tox screen  - Patient recently admitted to [facility] for similar complaints; CT abdomen pelvis without acute process; EGD performed with revealed LA Grade D reflux esophagitis. Per note "Pt continues to report "10/10"abdominal pain  Although objectively  Several times I have peeked in on him he is sleeping."  - Patient endorses noncompliance with home medications, including medications he was discharged with on 8/10  - Long discussion regarding " concern for opioid dependency and cannabis hyperemesis syndrome. Discussed avoidance of opioids during admission. Patient voiced understanding.   - Continue PPI BID   - CLD, ADAT  - Supportive care, antiemetics PRN, GI cocktail PRN, bentyl    Essential hypertension  - Asymptomatic hypertension on admission in setting of emesis and noncompliance with home medications  - Resume home norvasc  - Held enalapril and HCTZ in setting of emesis  - Continue to monitor    Type 2 diabetes mellitus, with long-term current use of insulin  -  on admit  - Last A1c 7.1 on 8/7/2019  - Patient endorses noncompliance with home insulin  - Sugar free CLD, ADAT  - Low dose SSI while limited PO intake  - Monitor BGs and adjust insulin PRN    Tobacco abuse  - Declined nicotine patch  - Educated on cessation      VTE Risk Mitigation (From admission, onward)        Ordered     Place RAZIA hose  Until discontinued      08/19/19 1210     Place sequential compression device  Until discontinued      08/19/19 1210     IP VTE HIGH RISK PATIENT  Once      08/19/19 1210             Anamika Mcmillan PA-C  Department of Hospital Medicine   [facility]

## 2019-08-20 ENCOUNTER — TELEPHONE (OUTPATIENT)
Dept: PHARMACY | Facility: CLINIC | Age: 59
End: 2019-08-20

## 2019-08-20 VITALS
SYSTOLIC BLOOD PRESSURE: 152 MMHG | DIASTOLIC BLOOD PRESSURE: 100 MMHG | RESPIRATION RATE: 16 BRPM | HEART RATE: 83 BPM | TEMPERATURE: 100 F | HEIGHT: 69 IN | OXYGEN SATURATION: 95 % | WEIGHT: 179 LBS | BODY MASS INDEX: 26.51 KG/M2

## 2019-08-20 LAB
ANION GAP SERPL CALC-SCNC: 11 MMOL/L (ref 8–16)
BASOPHILS # BLD AUTO: 0.03 K/UL (ref 0–0.2)
BASOPHILS NFR BLD: 0.2 % (ref 0–1.9)
BUN SERPL-MCNC: 18 MG/DL (ref 6–20)
CALCIUM SERPL-MCNC: 9.6 MG/DL (ref 8.7–10.5)
CHLORIDE SERPL-SCNC: 105 MMOL/L (ref 95–110)
CO2 SERPL-SCNC: 25 MMOL/L (ref 23–29)
CREAT SERPL-MCNC: 1.2 MG/DL (ref 0.5–1.4)
DIFFERENTIAL METHOD: ABNORMAL
EOSINOPHIL # BLD AUTO: 0 K/UL (ref 0–0.5)
EOSINOPHIL NFR BLD: 0.2 % (ref 0–8)
ERYTHROCYTE [DISTWIDTH] IN BLOOD BY AUTOMATED COUNT: 13.2 % (ref 11.5–14.5)
EST. GFR  (AFRICAN AMERICAN): >60 ML/MIN/1.73 M^2
EST. GFR  (NON AFRICAN AMERICAN): >60 ML/MIN/1.73 M^2
GLUCOSE SERPL-MCNC: 198 MG/DL (ref 70–110)
HCT VFR BLD AUTO: 47.4 % (ref 40–54)
HGB BLD-MCNC: 15.4 G/DL (ref 14–18)
IMM GRANULOCYTES # BLD AUTO: 0.06 K/UL (ref 0–0.04)
IMM GRANULOCYTES NFR BLD AUTO: 0.4 % (ref 0–0.5)
LYMPHOCYTES # BLD AUTO: 2.4 K/UL (ref 1–4.8)
LYMPHOCYTES NFR BLD: 17.3 % (ref 18–48)
MAGNESIUM SERPL-MCNC: 1.9 MG/DL (ref 1.6–2.6)
MCH RBC QN AUTO: 28.4 PG (ref 27–31)
MCHC RBC AUTO-ENTMCNC: 32.5 G/DL (ref 32–36)
MCV RBC AUTO: 88 FL (ref 82–98)
MONOCYTES # BLD AUTO: 0.9 K/UL (ref 0.3–1)
MONOCYTES NFR BLD: 6.7 % (ref 4–15)
NEUTROPHILS # BLD AUTO: 10.5 K/UL (ref 1.8–7.7)
NEUTROPHILS NFR BLD: 75.2 % (ref 38–73)
NRBC BLD-RTO: 0 /100 WBC
PHOSPHATE SERPL-MCNC: 2.6 MG/DL (ref 2.7–4.5)
PLATELET # BLD AUTO: 254 K/UL (ref 150–350)
PMV BLD AUTO: 11.6 FL (ref 9.2–12.9)
POCT GLUCOSE: 191 MG/DL (ref 70–110)
POCT GLUCOSE: 209 MG/DL (ref 70–110)
POTASSIUM SERPL-SCNC: 3.7 MMOL/L (ref 3.5–5.1)
RBC # BLD AUTO: 5.42 M/UL (ref 4.6–6.2)
SODIUM SERPL-SCNC: 141 MMOL/L (ref 136–145)
WBC # BLD AUTO: 14.02 K/UL (ref 3.9–12.7)

## 2019-08-20 PROCEDURE — 99217 PR OBSERVATION CARE DISCHARGE: ICD-10-PCS | Mod: ,,, | Performed by: PHYSICIAN ASSISTANT

## 2019-08-20 PROCEDURE — 99217 PR OBSERVATION CARE DISCHARGE: CPT | Mod: ,,, | Performed by: PHYSICIAN ASSISTANT

## 2019-08-20 PROCEDURE — 25000003 PHARM REV CODE 250: Performed by: PHYSICIAN ASSISTANT

## 2019-08-20 PROCEDURE — 80048 BASIC METABOLIC PNL TOTAL CA: CPT

## 2019-08-20 PROCEDURE — 82962 GLUCOSE BLOOD TEST: CPT

## 2019-08-20 PROCEDURE — 83735 ASSAY OF MAGNESIUM: CPT

## 2019-08-20 PROCEDURE — 85025 COMPLETE CBC W/AUTO DIFF WBC: CPT

## 2019-08-20 PROCEDURE — 84100 ASSAY OF PHOSPHORUS: CPT

## 2019-08-20 PROCEDURE — G0378 HOSPITAL OBSERVATION PER HR: HCPCS

## 2019-08-20 PROCEDURE — 36415 COLL VENOUS BLD VENIPUNCTURE: CPT

## 2019-08-20 RX ADMIN — PANTOPRAZOLE SODIUM 40 MG: 40 TABLET, DELAYED RELEASE ORAL at 08:08

## 2019-08-20 RX ADMIN — DICYCLOMINE HYDROCHLORIDE 20 MG: 20 TABLET ORAL at 06:08

## 2019-08-20 RX ADMIN — ALUMINUM HYDROXIDE, MAGNESIUM HYDROXIDE, AND SIMETHICONE 50 ML: 200; 200; 20 SUSPENSION ORAL at 09:08

## 2019-08-20 RX ADMIN — INSULIN ASPART 2 UNITS: 100 INJECTION, SOLUTION INTRAVENOUS; SUBCUTANEOUS at 08:08

## 2019-08-20 RX ADMIN — AMLODIPINE BESYLATE 10 MG: 10 TABLET ORAL at 08:08

## 2019-08-20 NOTE — HOSPITAL COURSE
Mr. Barragan was admitted to observation for intractable vomiting with nausea and abdominal pain. UDS positive for THC, opioid, and barbiturate (recently rx medication containing phenobarbital for abdominal pain). Leukocytosis improving from 17--> 14. Likely reactionary from vomiting. Remained afebrile. Nausea, vomiting, and pain improved with supportive care and GI cocktail. Tolerating liquid diet without issues. Patient non-compliant with Protonix, educated on importance of taking every day. Patient discharged on home medications. Instructed to follow up with PCP in 1 week. Patient would benefit from pain management, will defer to PCP for further referral. Patient verbalized understanding. All questions answered.

## 2019-08-20 NOTE — PLAN OF CARE
08/20/19 1409   Post-Acute Status   Post-Acute Authorization Other   Other Status No Post-Acute Service Needs     No SW needs identified at this time.    Malena Morales LMSW  [facility]  [id number]

## 2019-08-20 NOTE — ASSESSMENT & PLAN NOTE
- Asymptomatic hypertension on admission in setting of emesis and noncompliance with home medications  - Resume home norvasc  - Held enalapril and HCTZ in setting of emesis  - Continue to monitor  - Ok to continue at discharge

## 2019-08-20 NOTE — DISCHARGE SUMMARY
"[facility] Hospital Medicine  Discharge Summary      Patient Name: Emery Barragan  MRN: [id number]  Admission Date: 8/19/2019  Hospital Length of Stay: 0 days  Discharge Date and Time:  08/20/2019 2:56 PM  Attending Physician: No att. providers found   Discharging Provider: Kelin Crowley PA-C  Primary Care Provider: [facility]  Hospital Medicine Team: [facility] Kelin Crowley PA-C    HPI:   58 year old male with a PMHx of HTN and DM2 presenting to the ER for abdominal pain and N/V. Patient with fluctuations in HPI. Patient reports acute onset of abdominal pain this morning upon awakening that was "everywhere." He reports 10/10 pain at that time, now improved to 5/10. He reports associated nausea and nonbloody emesis. He reports persistent vomiting since this morning; he is unable to report how many episodes. Patient reports similar symptoms last week when he was hospitalized at [facility]. Patient reports he has not filled medications he was discharged with. Of note, patient reports marijuana use, tobacco abuse, and taking "leftover Vicodin." He denies CP, SOB, hematemesis, hematochezia, melena, dysuria/frequency, headache, dizziness, focal weakness, and fever/chills.        Hypertensive on admission. Labs show WBC 17, chronic CKD, , lipase 86, lactic acid 2.4->1.3. CT abdomen pelvis without acute intraabdominal process.    * No surgery found *      Hospital Course:   Mr. Barragan was admitted to observation for intractable vomiting with nausea and abdominal pain. UDS positive for THC, opioid, and barbiturate (recently rx medication containing phenobarbital for abdominal pain). Leukocytosis improving from 17--> 14. Likely reactionary from vomiting. Remained afebrile. Nausea, vomiting, and pain improved with supportive care and GI cocktail. Tolerating liquid diet without issues. Patient non-compliant with Protonix, educated on importance of taking every day. Patient " "discharged on home medications. Instructed to follow up with PCP in 1 week. Patient would benefit from pain management, will defer to PCP for further referral. Patient verbalized understanding. All questions answered.      Consults:     * Intractable vomiting with nausea  Cannabis hyperemesis syndrome  LA Grade D Esophagitis  - Hypertensive on admission in setting of emesis and noncompliance with home medications; patient is asymptomatic  - WBC 17 likely 2/2 emesis; remains afebrile--> improved to 14  - Lipase 86  - Lactic acid 2.4->1.3  - Benign abdominal exam  - CT abdomen pelvis without acute intraabdominal process  - Utox persistently positive for THC and opioids; patient endorses THC use and taking "leftover Vicodin"; no opioid Rx in 2 years  - Ordered repeat tox screen  - Patient recently admitted to [facility] [place] operated by [facility] for similar complaints; CT abdomen pelvis without acute process; EGD performed with revealed LA Grade D reflux esophagitis. Per note "Pt continues to report "10/10"abdominal pain  Although objectively  Several times I have peeked in on him he is sleeping."  - Patient endorses noncompliance with home medications, including medications he was discharged with on 8/10  - Long discussion regarding concern for opioid dependency and cannabis hyperemesis syndrome. Discussed avoidance of opioids during admission. Patient voiced understanding.   - Continue PPI BID   - CLD, ADAT  - Supportive care, antiemetics PRN, GI cocktail PRN, bentyl  - Follow up with PCP in 1 week  - Would benefit from pain management, will defer to PCP    Tobacco abuse  - Declined nicotine patch  - Educated on cessation    Essential hypertension  - Asymptomatic hypertension on admission in setting of emesis and noncompliance with home medications  - Resume home norvasc  - Held enalapril and HCTZ in setting of emesis  - Continue to monitor  - Ok to continue at discharge    Type 2 diabetes mellitus, with long-term current use of insulin  - BG " 293 on admit  - Last A1c 7.1 on 8/7/2019  - Patient endorses noncompliance with home insulin  - Sugar free CLD, ADAT  - Low dose SSI while limited PO intake  - Monitor BGs and adjust insulin PRN  - Follow up with PCP in 1 week      Final Active Diagnoses:    Diagnosis Date Noted POA    PRINCIPAL PROBLEM:  Intractable vomiting with nausea [R11.2] 08/06/2019 Yes    Cannabis hyperemesis syndrome concurrent with and due to cannabis abuse [F12.188] 08/19/2019 Yes    Esophagitis, [place] grade D [K20.8] 08/09/2019 Yes    Tobacco abuse [Z72.0] 08/11/2018 Yes    Type 2 diabetes mellitus, with long-term current use of insulin [E11.9, Z79.4] 04/25/2016 Not Applicable    Essential hypertension [I10] 04/25/2016 Yes      Problems Resolved During this Admission:       Discharged Condition: good    Disposition: Home or Self Care    Follow Up:  Follow-up Information     [facility] In 1 week.    Contact information:  [address] [place], [place] [id number] [phone]                 Patient Instructions:      Diet diabetic     Diet Cardiac     Notify your health care provider if you experience any of the following:  temperature >100.4     Notify your health care provider if you experience any of the following:  persistent nausea and vomiting or diarrhea     Notify your health care provider if you experience any of the following:  increased confusion or weakness     Activity as tolerated       Significant Diagnostic Studies: Labs:   CMP   Recent Labs   Lab 08/19/19  0846 08/20/19  0517    141   K 3.6 3.7    105   CO2 20* 25   * 198*   BUN 18 18   CREATININE 1.3 1.2   CALCIUM 9.6 9.6   PROT 7.2  --    ALBUMIN 3.7  --    BILITOT 0.3  --    ALKPHOS 86  --    AST 24  --    ALT 33  --    ANIONGAP 15 11   ESTGFRAFRICA >60.0 >60.0   EGFRNONAA 59.3* >60.0   , CBC   Recent Labs   Lab 08/19/19  0846  08/20/19  0517   WBC 17.66*  --  14.02*   HGB 16.7  --  15.4   HCT 49.5   < > 47.4     " --  254    < > = values in this interval not displayed.    and A1C:   Recent Labs   Lab 08/07/19  0515   HGBA1C 7.1*       Pending Diagnostic Studies:     None         Medications:  Reconciled Home Medications:      Medication List      CONTINUE taking these medications    amLODIPine 10 MG tablet  Commonly known as:  NORVASC  Take 10 mg by mouth once daily.     atropine-phenobarbital-scopolamine-hyoscyamine 16.2-0.1037 -0.0194 mg Tab  Take 1 tablet by mouth 3 (three) times daily as needed (abdominal pain).     BD INSULIN SYRINGE ULTRA-FINE 0.5 mL 31 gauge x 5/16" Syrg  Generic drug:  insulin syringe-needle U-100  INJECT 3 TIMES A DAY AS DIRECTED     calcium carbonate 200 mg calcium (500 mg) chewable tablet  Commonly known as:  TUMS  Take 2 tablets by mouth once daily.     CENTRUM SILVER ORAL  Take 1 tablet by mouth once daily.     DULoxetine 20 MG capsule  Commonly known as:  CYMBALTA  Take 1 capsule (20 mg total) by mouth 2 (two) times daily.     enalapril 20 MG tablet  Commonly known as:  VASOTEC  Take 20 mg by mouth once daily.     hydroCHLOROthiazide 12.5 mg capsule  Commonly known as:  MICROZIDE  Take 12.5 mg by mouth once daily.     insulin glargine 100 unit/mL injection  Commonly known as:  LANTUS  Inject 40 Units into the skin every morning.     insulin lispro 100 unit/mL injection  Sliding Scale starting at 2 units     latanoprost 0.005 % ophthalmic solution  Place 1 drop into both eyes every evening.     ondansetron 4 MG tablet  Commonly known as:  ZOFRAN  Take 1 tablet (4 mg total) by mouth every 6 (six) hours as needed for Nausea.     ONETOUCH ULTRA TEST Strp  Generic drug:  blood sugar diagnostic  TEST TWICE DAILY.     pantoprazole 40 MG tablet  Commonly known as:  PROTONIX  Take 1 tablet (40 mg total) by mouth 2 (two) times daily.     promethazine 25 MG tablet  Commonly known as:  PHENERGAN  Take 1 tablet (25 mg total) by mouth every 6 (six) hours as needed for Nausea (vomiting).            Indwelling " Lines/Drains at time of discharge:   Lines/Drains/Airways          None          Time spent on the discharge of patient: 36 minutes  Patient was seen and examined on the date of discharge and determined to be suitable for discharge.         Kelin Crowley PA-C  Department of Hospital Medicine  [facility]

## 2019-08-20 NOTE — PLAN OF CARE
Problem: Adult Inpatient Plan of Care  Goal: Plan of Care Review  Outcome: Ongoing (interventions implemented as appropriate)  Safety maintained. NADN. C/o abd discomfort, PRN med given as ordered with relief reported. Denies nausea/vomiting. Glucose monitoring remain in place. Clear liquid diet. Encouraged to call for assistance. Will continue to monitor.

## 2019-08-20 NOTE — ASSESSMENT & PLAN NOTE
-  on admit  - Last A1c 7.1 on 8/7/2019  - Patient endorses noncompliance with home insulin  - Sugar free CLD, ADAT  - Low dose SSI while limited PO intake  - Monitor BGs and adjust insulin PRN  - Follow up with PCP in 1 week

## 2019-08-20 NOTE — TELEPHONE ENCOUNTER
I spoke with patient and explained that he would not qualify for the Pharmacy [employer] Assistance program due to having [place] Medicaid. I will mail my card for future assistance if needed.

## 2019-08-20 NOTE — PLAN OF CARE
PT D/C TO HOME BEFORE COMPLETION OF D/C ASSESSMENT        08/20/19 9390   Discharge Assessment   Assessment Type Discharge Planning Assessment   Assessment information obtained from? Medical Record

## 2019-08-20 NOTE — PLAN OF CARE
08/20/19 1312   Final Note   Assessment Type Final Discharge Note   Anticipated Discharge Disposition Home

## 2019-08-20 NOTE — ASSESSMENT & PLAN NOTE
"Cannabis hyperemesis syndrome  LA Grade D Esophagitis  - Hypertensive on admission in setting of emesis and noncompliance with home medications; patient is asymptomatic  - WBC 17 likely 2/2 emesis; remains afebrile--> improved to 14  - Lipase 86  - Lactic acid 2.4->1.3  - Benign abdominal exam  - CT abdomen pelvis without acute intraabdominal process  - Utox persistently positive for THC and opioids; patient endorses THC use and taking "leftover Vicodin"; no opioid Rx in 2 years  - Ordered repeat tox screen  - Patient recently admitted to [facility] for similar complaints; CT abdomen pelvis without acute process; EGD performed with revealed LA Grade D reflux esophagitis. Per note "Pt continues to report "10/10"abdominal pain  Although objectively  Several times I have peeked in on him he is sleeping."  - Patient endorses noncompliance with home medications, including medications he was discharged with on 8/10  - Long discussion regarding concern for opioid dependency and cannabis hyperemesis syndrome. Discussed avoidance of opioids during admission. Patient voiced understanding.   - Continue PPI BID   - CLD, ADAT  - Supportive care, antiemetics PRN, GI cocktail PRN, bentyl  - Follow up with PCP in 1 week  - Would benefit from pain management, will defer to PCP  "

## 2019-08-20 NOTE — PLAN OF CARE
Reviewed pt avs discharge papers. No questions noted. Pt has a follow up with [facility] in 1 week. Instructed to return to emergency room if symptoms worsen. Pt discharged home to self and family care.

## 2019-08-27 NOTE — PHYSICIAN QUERY
PT Name: Emery Barragan  MR #: [id number]     Physician Query Form - Documentation Clarification      CDS: Zhane FOWLER,RN        Contact information:[phone]    This form is a permanent document in the medical record.     Query Date: August 27, 2019    By submitting this query, we are merely seeking further clarification of documentation. Please utilize your independent clinical judgment when addressing the question(s) below.    The Medical record reflects the following:    Supporting Clinical Findings Location in Medical Record   His weight has been stable and in the recent past had an upper endoscopy that showed a gastritis and he is on a proton pump inhibitor for this.    Chronic Gastritis              8/7/19 Gastroenterology Consults         8/22/19 Physician Query Response   Impression:    LA Grade D reflux esophagitis.  Medium-sized hiatal hernia.  Gastritis. Biopsied.  Normal examined duodenum.    FINAL PATHOLOGIC DIAGNOSIS  Stomach, biopsy:  Gastric fundic and antral mucosa with inactive chronic gastritis and focal intestinal metaplasia (complete type). Immunostain for H pylori is negative for organisms. Negative for dysplasia or malignancy.        8/9/19 Upper GI Endoscopy                8/14/19 Final Pathology Report                                                                                   Doctor, Please specify diagnosis or diagnoses associated with above clinical findings.    Please specify the type of Chronic Gastritis:  Provider Use Only     [    ] Chronic Atrophic Gastritis without Bleeding     [   X ] Chronic Superficial Gastritis without Bleeding     [    ] Other ( Please Specify)________________________________                                                                                                                    [  ] Clinically Undetermined

## 2021-06-10 ENCOUNTER — HOSPITAL ENCOUNTER (EMERGENCY)
Facility: OTHER | Age: 61
Discharge: HOME | End: 2021-06-10
Attending: EMERGENCY MEDICINE
Payer: MEDICARE

## 2021-06-10 VITALS
TEMPERATURE: 98 F | SYSTOLIC BLOOD PRESSURE: 145 MMHG | DIASTOLIC BLOOD PRESSURE: 98 MMHG | HEIGHT: 69 IN | WEIGHT: 198 LBS | RESPIRATION RATE: 17 BRPM | BODY MASS INDEX: 29.33 KG/M2 | OXYGEN SATURATION: 98 % | HEART RATE: 82 BPM

## 2021-06-10 DIAGNOSIS — I10 ESSENTIAL HYPERTENSION: ICD-10-CM

## 2021-06-10 DIAGNOSIS — K64.9 HEMORRHOIDS, UNSPECIFIED HEMORRHOID TYPE: Primary | ICD-10-CM

## 2021-06-10 DIAGNOSIS — R19.7 DIARRHEA, UNSPECIFIED TYPE: ICD-10-CM

## 2021-06-10 DIAGNOSIS — K60.2 ANAL FISSURE: ICD-10-CM

## 2021-06-10 DIAGNOSIS — M54.31 SCIATICA OF RIGHT SIDE: ICD-10-CM

## 2021-06-10 LAB
ALBUMIN SERPL BCP-MCNC: 3.8 G/DL (ref 3.5–5.2)
ALP SERPL-CCNC: 80 U/L (ref 55–135)
ALT SERPL W/O P-5'-P-CCNC: 22 U/L (ref 10–44)
ANION GAP SERPL CALC-SCNC: 9 MMOL/L (ref 8–16)
AST SERPL-CCNC: 20 U/L (ref 10–40)
BASOPHILS # BLD AUTO: 0.04 K/UL (ref 0–0.2)
BASOPHILS NFR BLD: 0.4 % (ref 0–1.9)
BILIRUB SERPL-MCNC: 0.7 MG/DL (ref 0.1–1)
BUN SERPL-MCNC: 23 MG/DL (ref 8–23)
CALCIUM SERPL-MCNC: 10.3 MG/DL (ref 8.7–10.5)
CHLORIDE SERPL-SCNC: 107 MMOL/L (ref 95–110)
CO2 SERPL-SCNC: 23 MMOL/L (ref 23–29)
CREAT SERPL-MCNC: 1.9 MG/DL (ref 0.5–1.4)
DIFFERENTIAL METHOD: ABNORMAL
EOSINOPHIL # BLD AUTO: 0 K/UL (ref 0–0.5)
EOSINOPHIL NFR BLD: 0.4 % (ref 0–8)
ERYTHROCYTE [DISTWIDTH] IN BLOOD BY AUTOMATED COUNT: 13.2 % (ref 11.5–14.5)
EST. GFR  (AFRICAN AMERICAN): 43 ML/MIN/1.73 M^2
EST. GFR  (NON AFRICAN AMERICAN): 37 ML/MIN/1.73 M^2
GLUCOSE SERPL-MCNC: 200 MG/DL (ref 70–110)
HCT VFR BLD AUTO: 54.6 % (ref 40–54)
HGB BLD-MCNC: 18.6 G/DL (ref 14–18)
HIV 1+2 AB+HIV1 P24 AG SERPL QL IA: NEGATIVE
IMM GRANULOCYTES # BLD AUTO: 0.03 K/UL (ref 0–0.04)
IMM GRANULOCYTES NFR BLD AUTO: 0.3 % (ref 0–0.5)
LYMPHOCYTES # BLD AUTO: 2.7 K/UL (ref 1–4.8)
LYMPHOCYTES NFR BLD: 25.5 % (ref 18–48)
MCH RBC QN AUTO: 28.6 PG (ref 27–31)
MCHC RBC AUTO-ENTMCNC: 34.1 G/DL (ref 32–36)
MCV RBC AUTO: 84 FL (ref 82–98)
MONOCYTES # BLD AUTO: 0.8 K/UL (ref 0.3–1)
MONOCYTES NFR BLD: 7.3 % (ref 4–15)
NEUTROPHILS # BLD AUTO: 6.9 K/UL (ref 1.8–7.7)
NEUTROPHILS NFR BLD: 66.1 % (ref 38–73)
NRBC BLD-RTO: 0 /100 WBC
PLATELET # BLD AUTO: 164 K/UL (ref 150–450)
PMV BLD AUTO: 12.1 FL (ref 9.2–12.9)
POCT GLUCOSE: 214 MG/DL (ref 70–110)
POTASSIUM SERPL-SCNC: 3.4 MMOL/L (ref 3.5–5.1)
PROT SERPL-MCNC: 7.2 G/DL (ref 6–8.4)
RBC # BLD AUTO: 6.5 M/UL (ref 4.6–6.2)
SODIUM SERPL-SCNC: 139 MMOL/L (ref 136–145)
WBC # BLD AUTO: 10.39 K/UL (ref 3.9–12.7)

## 2021-06-10 PROCEDURE — 82962 GLUCOSE BLOOD TEST: CPT

## 2021-06-10 PROCEDURE — 63600175 PHARM REV CODE 636 W HCPCS: Performed by: EMERGENCY MEDICINE

## 2021-06-10 PROCEDURE — 96374 THER/PROPH/DIAG INJ IV PUSH: CPT

## 2021-06-10 PROCEDURE — 99284 EMERGENCY DEPT VISIT MOD MDM: CPT | Mod: 25

## 2021-06-10 PROCEDURE — 85025 COMPLETE CBC W/AUTO DIFF WBC: CPT | Performed by: PHYSICIAN ASSISTANT

## 2021-06-10 PROCEDURE — 80053 COMPREHEN METABOLIC PANEL: CPT | Performed by: PHYSICIAN ASSISTANT

## 2021-06-10 PROCEDURE — 96372 THER/PROPH/DIAG INJ SC/IM: CPT | Mod: 59

## 2021-06-10 PROCEDURE — 86703 HIV-1/HIV-2 1 RESULT ANTBDY: CPT | Performed by: EMERGENCY MEDICINE

## 2021-06-10 RX ORDER — KETOROLAC TROMETHAMINE 30 MG/ML
30 INJECTION, SOLUTION INTRAMUSCULAR; INTRAVENOUS
Status: DISCONTINUED | OUTPATIENT
Start: 2021-06-10 | End: 2021-06-10

## 2021-06-10 RX ORDER — IBUPROFEN 600 MG/1
600 TABLET ORAL EVERY 6 HOURS PRN
Qty: 9 TABLET | Refills: 0 | Status: SHIPPED | OUTPATIENT
Start: 2021-06-10 | End: 2022-04-05

## 2021-06-10 RX ORDER — KETOROLAC TROMETHAMINE 30 MG/ML
15 INJECTION, SOLUTION INTRAMUSCULAR; INTRAVENOUS
Status: COMPLETED | OUTPATIENT
Start: 2021-06-10 | End: 2021-06-10

## 2021-06-10 RX ORDER — ORPHENADRINE CITRATE 100 MG/1
100 TABLET, EXTENDED RELEASE ORAL DAILY PRN
Qty: 5 TABLET | Refills: 0 | Status: SHIPPED | OUTPATIENT
Start: 2021-06-10 | End: 2021-06-15

## 2021-06-10 RX ORDER — DEXAMETHASONE SODIUM PHOSPHATE 4 MG/ML
8 INJECTION, SOLUTION INTRA-ARTICULAR; INTRALESIONAL; INTRAMUSCULAR; INTRAVENOUS; SOFT TISSUE
Status: COMPLETED | OUTPATIENT
Start: 2021-06-10 | End: 2021-06-10

## 2021-06-10 RX ADMIN — KETOROLAC TROMETHAMINE 15 MG: 30 INJECTION, SOLUTION INTRAMUSCULAR; INTRAVENOUS at 11:06

## 2021-06-10 RX ADMIN — DEXAMETHASONE SODIUM PHOSPHATE 8 MG: 4 INJECTION INTRA-ARTICULAR; INTRALESIONAL; INTRAMUSCULAR; INTRAVENOUS; SOFT TISSUE at 11:06

## 2022-04-05 ENCOUNTER — HOSPITAL ENCOUNTER (OUTPATIENT)
Facility: OTHER | Age: 62
Discharge: HOME | End: 2022-04-06
Attending: EMERGENCY MEDICINE | Admitting: INTERNAL MEDICINE
Payer: MEDICARE

## 2022-04-05 DIAGNOSIS — I72.8 CELIAC ARTERY ANEURYSM: ICD-10-CM

## 2022-04-05 DIAGNOSIS — R07.9 CHEST PAIN: ICD-10-CM

## 2022-04-05 DIAGNOSIS — R11.0 NAUSEA: Primary | ICD-10-CM

## 2022-04-05 DIAGNOSIS — I10 HYPERTENSION, UNSPECIFIED TYPE: ICD-10-CM

## 2022-04-05 DIAGNOSIS — R11.10 VOMITING: ICD-10-CM

## 2022-04-05 PROBLEM — N18.30 CKD (CHRONIC KIDNEY DISEASE) STAGE 3, GFR 30-59 ML/MIN: Status: ACTIVE | Noted: 2022-04-05

## 2022-04-05 LAB
ALBUMIN SERPL BCP-MCNC: 4 G/DL (ref 3.5–5.2)
ALP SERPL-CCNC: 67 U/L (ref 55–135)
ALT SERPL W/O P-5'-P-CCNC: 25 U/L (ref 10–44)
ANION GAP SERPL CALC-SCNC: 15 MMOL/L (ref 8–16)
AST SERPL-CCNC: 22 U/L (ref 10–40)
BASOPHILS # BLD AUTO: 0.04 K/UL (ref 0–0.2)
BASOPHILS NFR BLD: 0.2 % (ref 0–1.9)
BILIRUB SERPL-MCNC: 0.4 MG/DL (ref 0.1–1)
BUN SERPL-MCNC: 25 MG/DL (ref 8–23)
CALCIUM SERPL-MCNC: 10.5 MG/DL (ref 8.7–10.5)
CHLORIDE SERPL-SCNC: 110 MMOL/L (ref 95–110)
CO2 SERPL-SCNC: 20 MMOL/L (ref 23–29)
CREAT SERPL-MCNC: 1.6 MG/DL (ref 0.5–1.4)
CTP QC/QA: YES
DIFFERENTIAL METHOD: ABNORMAL
EOSINOPHIL # BLD AUTO: 0.1 K/UL (ref 0–0.5)
EOSINOPHIL NFR BLD: 0.5 % (ref 0–8)
ERYTHROCYTE [DISTWIDTH] IN BLOOD BY AUTOMATED COUNT: 13 % (ref 11.5–14.5)
EST. GFR  (AFRICAN AMERICAN): 52 ML/MIN/1.73 M^2
EST. GFR  (NON AFRICAN AMERICAN): 45 ML/MIN/1.73 M^2
GLUCOSE SERPL-MCNC: 163 MG/DL (ref 70–110)
HCT VFR BLD AUTO: 49.3 % (ref 40–54)
HGB BLD-MCNC: 17 G/DL (ref 14–18)
HIV 1+2 AB+HIV1 P24 AG SERPL QL IA: NEGATIVE
IMM GRANULOCYTES # BLD AUTO: 0.07 K/UL (ref 0–0.04)
IMM GRANULOCYTES NFR BLD AUTO: 0.4 % (ref 0–0.5)
LACTATE SERPL-SCNC: 1.9 MMOL/L (ref 0.5–2.2)
LYMPHOCYTES # BLD AUTO: 3.8 K/UL (ref 1–4.8)
LYMPHOCYTES NFR BLD: 19.6 % (ref 18–48)
MCH RBC QN AUTO: 29.6 PG (ref 27–31)
MCHC RBC AUTO-ENTMCNC: 34.5 G/DL (ref 32–36)
MCV RBC AUTO: 86 FL (ref 82–98)
MONOCYTES # BLD AUTO: 1.4 K/UL (ref 0.3–1)
MONOCYTES NFR BLD: 7.1 % (ref 4–15)
NEUTROPHILS # BLD AUTO: 14.2 K/UL (ref 1.8–7.7)
NEUTROPHILS NFR BLD: 72.2 % (ref 38–73)
NRBC BLD-RTO: 0 /100 WBC
PLATELET # BLD AUTO: 157 K/UL (ref 150–450)
PMV BLD AUTO: 11.2 FL (ref 9.2–12.9)
POCT GLUCOSE: 158 MG/DL (ref 70–110)
POCT GLUCOSE: 162 MG/DL (ref 70–110)
POCT GLUCOSE: 91 MG/DL (ref 70–110)
POTASSIUM SERPL-SCNC: 3.2 MMOL/L (ref 3.5–5.1)
PROT SERPL-MCNC: 6.9 G/DL (ref 6–8.4)
RBC # BLD AUTO: 5.75 M/UL (ref 4.6–6.2)
SARS-COV-2 RDRP RESP QL NAA+PROBE: NEGATIVE
SODIUM SERPL-SCNC: 145 MMOL/L (ref 136–145)
TROPONIN I SERPL DL<=0.01 NG/ML-MCNC: <0.006 NG/ML (ref 0–0.03)
WBC # BLD AUTO: 19.61 K/UL (ref 3.9–12.7)

## 2022-04-05 PROCEDURE — 99220 PR INITIAL OBSERVATION CARE,LEVL III: CPT | Mod: ,,, | Performed by: PHYSICIAN ASSISTANT

## 2022-04-05 PROCEDURE — 96376 TX/PRO/DX INJ SAME DRUG ADON: CPT

## 2022-04-05 PROCEDURE — 96372 THER/PROPH/DIAG INJ SC/IM: CPT | Mod: 59 | Performed by: PHYSICIAN ASSISTANT

## 2022-04-05 PROCEDURE — 99291 CRITICAL CARE FIRST HOUR: CPT | Mod: 25,CS

## 2022-04-05 PROCEDURE — 96375 TX/PRO/DX INJ NEW DRUG ADDON: CPT

## 2022-04-05 PROCEDURE — 96366 THER/PROPH/DIAG IV INF ADDON: CPT

## 2022-04-05 PROCEDURE — C9399 UNCLASSIFIED DRUGS OR BIOLOG: HCPCS | Performed by: PHYSICIAN ASSISTANT

## 2022-04-05 PROCEDURE — 80053 COMPREHEN METABOLIC PANEL: CPT | Performed by: EMERGENCY MEDICINE

## 2022-04-05 PROCEDURE — 82962 GLUCOSE BLOOD TEST: CPT | Mod: 91

## 2022-04-05 PROCEDURE — 87389 HIV-1 AG W/HIV-1&-2 AB AG IA: CPT | Performed by: EMERGENCY MEDICINE

## 2022-04-05 PROCEDURE — 85025 COMPLETE CBC W/AUTO DIFF WBC: CPT | Performed by: EMERGENCY MEDICINE

## 2022-04-05 PROCEDURE — 93005 ELECTROCARDIOGRAM TRACING: CPT

## 2022-04-05 PROCEDURE — 96365 THER/PROPH/DIAG IV INF INIT: CPT

## 2022-04-05 PROCEDURE — 99203 PR OFFICE/OUTPT VISIT, NEW, LEVL III, 30-44 MIN: ICD-10-PCS | Mod: ,,, | Performed by: SPECIALIST

## 2022-04-05 PROCEDURE — G0378 HOSPITAL OBSERVATION PER HR: HCPCS

## 2022-04-05 PROCEDURE — 25000003 PHARM REV CODE 250: Performed by: PHYSICIAN ASSISTANT

## 2022-04-05 PROCEDURE — 99220 PR INITIAL OBSERVATION CARE,LEVL III: ICD-10-PCS | Mod: ,,, | Performed by: PHYSICIAN ASSISTANT

## 2022-04-05 PROCEDURE — 63600175 PHARM REV CODE 636 W HCPCS: Performed by: EMERGENCY MEDICINE

## 2022-04-05 PROCEDURE — 83605 ASSAY OF LACTIC ACID: CPT | Performed by: EMERGENCY MEDICINE

## 2022-04-05 PROCEDURE — 99203 OFFICE O/P NEW LOW 30 MIN: CPT | Mod: ,,, | Performed by: SPECIALIST

## 2022-04-05 PROCEDURE — 25500020 PHARM REV CODE 255: Performed by: EMERGENCY MEDICINE

## 2022-04-05 PROCEDURE — 84484 ASSAY OF TROPONIN QUANT: CPT | Performed by: EMERGENCY MEDICINE

## 2022-04-05 PROCEDURE — 96361 HYDRATE IV INFUSION ADD-ON: CPT

## 2022-04-05 PROCEDURE — 93010 EKG 12-LEAD: ICD-10-PCS | Mod: ,,, | Performed by: INTERNAL MEDICINE

## 2022-04-05 PROCEDURE — U0002 COVID-19 LAB TEST NON-CDC: HCPCS | Performed by: EMERGENCY MEDICINE

## 2022-04-05 PROCEDURE — 25000003 PHARM REV CODE 250: Performed by: EMERGENCY MEDICINE

## 2022-04-05 PROCEDURE — 63600175 PHARM REV CODE 636 W HCPCS: Performed by: PHYSICIAN ASSISTANT

## 2022-04-05 PROCEDURE — 96372 THER/PROPH/DIAG INJ SC/IM: CPT | Performed by: EMERGENCY MEDICINE

## 2022-04-05 PROCEDURE — 93010 ELECTROCARDIOGRAM REPORT: CPT | Mod: ,,, | Performed by: INTERNAL MEDICINE

## 2022-04-05 RX ORDER — HALOPERIDOL 5 MG/ML
2 INJECTION INTRAMUSCULAR
Status: COMPLETED | OUTPATIENT
Start: 2022-04-05 | End: 2022-04-05

## 2022-04-05 RX ORDER — TALC
6 POWDER (GRAM) TOPICAL NIGHTLY PRN
Status: DISCONTINUED | OUTPATIENT
Start: 2022-04-05 | End: 2022-04-06 | Stop reason: HOSPADM

## 2022-04-05 RX ORDER — SODIUM CHLORIDE 0.9 % (FLUSH) 0.9 %
10 SYRINGE (ML) INJECTION
Status: DISCONTINUED | OUTPATIENT
Start: 2022-04-05 | End: 2022-04-06 | Stop reason: HOSPADM

## 2022-04-05 RX ORDER — ACETAMINOPHEN 325 MG/1
650 TABLET ORAL EVERY 4 HOURS PRN
Status: DISCONTINUED | OUTPATIENT
Start: 2022-04-05 | End: 2022-04-06 | Stop reason: HOSPADM

## 2022-04-05 RX ORDER — NALOXONE HCL 0.4 MG/ML
0.02 VIAL (ML) INJECTION
Status: DISCONTINUED | OUTPATIENT
Start: 2022-04-05 | End: 2022-04-06 | Stop reason: HOSPADM

## 2022-04-05 RX ORDER — HYDROMORPHONE HYDROCHLORIDE 1 MG/ML
0.5 INJECTION, SOLUTION INTRAMUSCULAR; INTRAVENOUS; SUBCUTANEOUS EVERY 6 HOURS PRN
Status: DISCONTINUED | OUTPATIENT
Start: 2022-04-05 | End: 2022-04-06

## 2022-04-05 RX ORDER — POLYETHYLENE GLYCOL 3350 17 G/17G
17 POWDER, FOR SOLUTION ORAL DAILY PRN
Status: DISCONTINUED | OUTPATIENT
Start: 2022-04-05 | End: 2022-04-06 | Stop reason: HOSPADM

## 2022-04-05 RX ORDER — AMLODIPINE BESYLATE 5 MG/1
10 TABLET ORAL DAILY
Status: DISCONTINUED | OUTPATIENT
Start: 2022-04-05 | End: 2022-04-06 | Stop reason: HOSPADM

## 2022-04-05 RX ORDER — POTASSIUM CHLORIDE 20 MEQ/1
20 TABLET, EXTENDED RELEASE ORAL
Status: COMPLETED | OUTPATIENT
Start: 2022-04-05 | End: 2022-04-05

## 2022-04-05 RX ORDER — PANTOPRAZOLE SODIUM 40 MG/1
40 TABLET, DELAYED RELEASE ORAL DAILY
Status: DISCONTINUED | OUTPATIENT
Start: 2022-04-05 | End: 2022-04-06 | Stop reason: HOSPADM

## 2022-04-05 RX ORDER — HYDROMORPHONE HYDROCHLORIDE 1 MG/ML
1 INJECTION, SOLUTION INTRAMUSCULAR; INTRAVENOUS; SUBCUTANEOUS
Status: COMPLETED | OUTPATIENT
Start: 2022-04-05 | End: 2022-04-05

## 2022-04-05 RX ORDER — AMLODIPINE BESYLATE 5 MG/1
10 TABLET ORAL DAILY
Status: DISCONTINUED | OUTPATIENT
Start: 2022-04-06 | End: 2022-04-05

## 2022-04-05 RX ORDER — ONDANSETRON 2 MG/ML
4 INJECTION INTRAMUSCULAR; INTRAVENOUS EVERY 6 HOURS PRN
Status: DISCONTINUED | OUTPATIENT
Start: 2022-04-05 | End: 2022-04-06 | Stop reason: HOSPADM

## 2022-04-05 RX ORDER — SODIUM CHLORIDE 9 MG/ML
1000 INJECTION, SOLUTION INTRAVENOUS
Status: COMPLETED | OUTPATIENT
Start: 2022-04-05 | End: 2022-04-05

## 2022-04-05 RX ORDER — INSULIN ASPART 100 [IU]/ML
0-5 INJECTION, SOLUTION INTRAVENOUS; SUBCUTANEOUS
Status: DISCONTINUED | OUTPATIENT
Start: 2022-04-05 | End: 2022-04-06 | Stop reason: HOSPADM

## 2022-04-05 RX ORDER — OMEPRAZOLE 40 MG/1
CAPSULE, DELAYED RELEASE ORAL
COMMUNITY
Start: 2022-03-19

## 2022-04-05 RX ORDER — CARVEDILOL 12.5 MG/1
25 TABLET ORAL 2 TIMES DAILY
Status: DISCONTINUED | OUTPATIENT
Start: 2022-04-05 | End: 2022-04-06 | Stop reason: HOSPADM

## 2022-04-05 RX ORDER — GABAPENTIN 600 MG/1
600 TABLET ORAL
COMMUNITY
End: 2022-04-05

## 2022-04-05 RX ORDER — HYDROCODONE BITARTRATE AND ACETAMINOPHEN 5; 325 MG/1; MG/1
1 TABLET ORAL EVERY 6 HOURS PRN
Status: DISCONTINUED | OUTPATIENT
Start: 2022-04-05 | End: 2022-04-06 | Stop reason: HOSPADM

## 2022-04-05 RX ORDER — INSULIN ASPART 100 [IU]/ML
1-10 INJECTION, SOLUTION INTRAVENOUS; SUBCUTANEOUS
Status: DISCONTINUED | OUTPATIENT
Start: 2022-04-05 | End: 2022-04-05

## 2022-04-05 RX ORDER — CARVEDILOL 25 MG/1
25 TABLET ORAL 2 TIMES DAILY
COMMUNITY
Start: 2022-03-03

## 2022-04-05 RX ORDER — ONDANSETRON 2 MG/ML
4 INJECTION INTRAMUSCULAR; INTRAVENOUS ONCE
Status: COMPLETED | OUTPATIENT
Start: 2022-04-05 | End: 2022-04-05

## 2022-04-05 RX ORDER — INSULIN ASPART 100 [IU]/ML
5-10 INJECTION, SOLUTION INTRAVENOUS; SUBCUTANEOUS
COMMUNITY
Start: 2022-03-03

## 2022-04-05 RX ORDER — LOSARTAN POTASSIUM AND HYDROCHLOROTHIAZIDE 25; 100 MG/1; MG/1
1 TABLET ORAL EVERY MORNING
COMMUNITY
Start: 2022-03-20

## 2022-04-05 RX ADMIN — SODIUM CHLORIDE 1000 ML: 0.9 INJECTION, SOLUTION INTRAVENOUS at 07:04

## 2022-04-05 RX ADMIN — HALOPERIDOL LACTATE 2 MG: 5 INJECTION, SOLUTION INTRAMUSCULAR at 07:04

## 2022-04-05 RX ADMIN — HYDROCODONE BITARTRATE AND ACETAMINOPHEN 1 TABLET: 5; 325 TABLET ORAL at 02:04

## 2022-04-05 RX ADMIN — SODIUM CHLORIDE 1000 ML: 0.9 INJECTION, SOLUTION INTRAVENOUS at 12:04

## 2022-04-05 RX ADMIN — HYDROMORPHONE HYDROCHLORIDE 1 MG: 1 INJECTION, SOLUTION INTRAMUSCULAR; INTRAVENOUS; SUBCUTANEOUS at 10:04

## 2022-04-05 RX ADMIN — HYDROCODONE BITARTRATE AND ACETAMINOPHEN 1 TABLET: 5; 325 TABLET ORAL at 09:04

## 2022-04-05 RX ADMIN — AMLODIPINE BESYLATE 10 MG: 5 TABLET ORAL at 05:04

## 2022-04-05 RX ADMIN — SODIUM CHLORIDE 1000 ML: 0.9 INJECTION, SOLUTION INTRAVENOUS at 10:04

## 2022-04-05 RX ADMIN — HYDROMORPHONE HYDROCHLORIDE 0.5 MG: 1 INJECTION, SOLUTION INTRAMUSCULAR; INTRAVENOUS; SUBCUTANEOUS at 05:04

## 2022-04-05 RX ADMIN — POTASSIUM CHLORIDE 20 MEQ: 1500 TABLET, EXTENDED RELEASE ORAL at 11:04

## 2022-04-05 RX ADMIN — ONDANSETRON 4 MG: 2 INJECTION INTRAMUSCULAR; INTRAVENOUS at 10:04

## 2022-04-05 RX ADMIN — PIPERACILLIN AND TAZOBACTAM 4.5 G: 4; .5 INJECTION, POWDER, LYOPHILIZED, FOR SOLUTION INTRAVENOUS; PARENTERAL at 10:04

## 2022-04-05 RX ADMIN — HYDROMORPHONE HYDROCHLORIDE 0.5 MG: 1 INJECTION, SOLUTION INTRAMUSCULAR; INTRAVENOUS; SUBCUTANEOUS at 11:04

## 2022-04-05 RX ADMIN — PANTOPRAZOLE SODIUM 40 MG: 40 TABLET, DELAYED RELEASE ORAL at 02:04

## 2022-04-05 RX ADMIN — INSULIN DETEMIR 10 UNITS: 100 INJECTION, SOLUTION SUBCUTANEOUS at 09:04

## 2022-04-05 RX ADMIN — CARVEDILOL 25 MG: 12.5 TABLET, FILM COATED ORAL at 09:04

## 2022-04-05 RX ADMIN — HYDROMORPHONE HYDROCHLORIDE 1 MG: 1 INJECTION, SOLUTION INTRAMUSCULAR; INTRAVENOUS; SUBCUTANEOUS at 09:04

## 2022-04-05 RX ADMIN — IOHEXOL 100 ML: 350 INJECTION, SOLUTION INTRAVENOUS at 09:04

## 2022-04-05 NOTE — PHARMACY MED REC
"  Admission Medication History     The home medication history was taken by Mary Anne Mckeon CPHT    Patient wasn't able to verify medications he asked that I call his pharmacy. I spoke with Dasha at [facility]     You may go to "Admission" then "Reconcile Home Medications" tabs to review and/or act upon these items.      The home medication list has been updated by the Pharmacy department.    Please read ALL comments highlighted in yellow.    Please address this information as you see fit.     Feel free to contact us if you have any questions or require assistance.      Mary Anne Mckeon CPHT  EXT: [id number]          .          "

## 2022-04-05 NOTE — ED NOTES
Patient continues to holler out in pain at this time, informed patient he will be getting a CT scan and then the provider will treat accordingly.

## 2022-04-05 NOTE — SUBJECTIVE & OBJECTIVE
Past Medical History:   Diagnosis Date    Chronic back pain     Diabetes mellitus     Gallstones     Glaucoma (increased eye pressure)     Hepatitis C     Hypertension        Past Surgical History:   Procedure Laterality Date    CHOLECYSTECTOMY      COLONOSCOPY N/A 2016    Procedure: COLONOSCOPY;  Surgeon: Andre Sanchez MD;  Location: [facility];  Service: Endoscopy;  Laterality: N/A;    ESOPHAGOGASTRODUODENOSCOPY N/A 2019    Procedure: ESOPHAGOGASTRODUODENOSCOPY (EGD);  Surgeon: Mike Garcia MD;  Location: [facility];  Service: Endoscopy;  Laterality: N/A;       Review of patient's allergies indicates:  No Known Allergies    No current facility-administered medications on file prior to encounter.     Current Outpatient Medications on File Prior to Encounter   Medication Sig    amlodipine (NORVASC) 10 MG tablet Take 10 mg by mouth once daily.    carvediloL (COREG) 25 MG tablet Take 25 mg by mouth 2 (two) times daily.    insulin glargine (LANTUS) 100 unit/mL injection Inject 40 Units into the skin every morning.     losartan-hydrochlorothiazide 100-25 mg (HYZAAR) 100-25 mg per tablet Take 1 tablet by mouth every morning.    NOVOLOG U-100 INSULIN ASPART 100 unit/mL injection Inject 5-10 Units into the skin 3 (three) times daily with meals.    omeprazole (PRILOSEC) 40 MG capsule SMARTSI Capsule(s) By Mouth Every Evening    [DISCONTINUED] enalapril (VASOTEC) 20 MG tablet Take 20 mg by mouth once daily.    [DISCONTINUED] atropine-phenobarbital-scopolamine-hyoscyamine 16.2-0.1037 -0.0194 mg Tab Take 1 tablet by mouth 3 (three) times daily as needed (abdominal pain).    [DISCONTINUED] BD INSULIN SYRINGE ULTRA-FINE 0.5 mL 31 gauge x 5/16 Syrg INJECT 3 TIMES A DAY AS DIRECTED    [DISCONTINUED] calcium carbonate (TUMS) 200 mg calcium (500 mg) chewable tablet Take 2 tablets by mouth once daily.    [DISCONTINUED] DULoxetine (CYMBALTA) 20 MG capsule Take 1 capsule (20 mg total) by mouth 2 (two) times daily.     [DISCONTINUED] folic acid/multivit-min/lutein (CENTRUM SILVER ORAL) Take 1 tablet by mouth once daily.    [DISCONTINUED] gabapentin (NEURONTIN) 600 MG tablet Take 600 mg by mouth.    [DISCONTINUED] hydrochlorothiazide (MICROZIDE) 12.5 mg capsule Take 12.5 mg by mouth once daily.    [DISCONTINUED] ibuprofen (ADVIL,MOTRIN) 600 MG tablet Take 1 tablet (600 mg total) by mouth every 6 (six) hours as needed for Pain.    [DISCONTINUED] insulin lispro 100 unit/mL injection Sliding Scale starting at 2 units    [DISCONTINUED] latanoprost 0.005 % ophthalmic solution Place 1 drop into both eyes every evening.    [DISCONTINUED] ondansetron (ZOFRAN) 4 MG tablet Take 1 tablet (4 mg total) by mouth every 6 (six) hours as needed for Nausea.    [DISCONTINUED] ONETOUCH ULTRA TEST Strp TEST TWICE DAILY.    [DISCONTINUED] pantoprazole (PROTONIX) 40 MG tablet Take 1 tablet (40 mg total) by mouth 2 (two) times daily.    [DISCONTINUED] promethazine (PHENERGAN) 25 MG tablet Take 1 tablet (25 mg total) by mouth every 6 (six) hours as needed for Nausea (vomiting).     Family History       Problem Relation (Age of Onset)    Diabetes Mother    Heart disease Mother    Kidney disease Mother          Tobacco Use    Smoking status: Current Every Day Smoker     Packs/day: 1.00     Types: Cigarettes    Smokeless tobacco: Never Used   Substance and Sexual Activity    Alcohol use: No    Drug use: No    Sexual activity: Yes     Partners: Female     Review of Systems   Constitutional:  Negative for activity change, chills, diaphoresis, fatigue and fever.   HENT:  Negative for congestion, dental problem, ear pain, rhinorrhea, sore throat, trouble swallowing and voice change.    Eyes:  Negative for visual disturbance.   Respiratory:  Negative for cough, chest tightness, shortness of breath and wheezing.    Cardiovascular:  Negative for chest pain, palpitations and leg swelling.   Gastrointestinal:  Positive for abdominal pain (resolved), nausea  (resolved) and vomiting (resolved). Negative for abdominal distention, blood in stool, constipation and diarrhea.   Genitourinary:  Negative for difficulty urinating, dysuria, flank pain, frequency, hematuria and urgency.   Musculoskeletal:  Negative for arthralgias, back pain, gait problem, joint swelling, myalgias and neck pain.   Skin:  Negative for color change, rash and wound.   Neurological:  Negative for dizziness, syncope, speech difficulty, weakness, light-headedness, numbness and headaches.   Hematological:  Does not bruise/bleed easily.   Psychiatric/Behavioral:  Negative for confusion.    Objective:     Vital Signs (Most Recent):  Temp: 98.3 °F (36.8 °C) (04/05/22 1048)  Pulse: 60 (04/05/22 1332)  Resp: 20 (04/05/22 1039)  BP: (!) 146/87 (04/05/22 1332)  SpO2: 99 % (04/05/22 1332)   Vital Signs (24h Range):  Temp:  [98 °F (36.7 °C)-98.3 °F (36.8 °C)] 98.3 °F (36.8 °C)  Pulse:  [60-95] 60  Resp:  [18-20] 20  SpO2:  [94 %-100 %] 99 %  BP: (131-185)/() 146/87     Weight: 95.3 kg (210 lb)  Body mass index is 31.01 kg/m².    Physical Exam  Vitals and nursing note reviewed.   Constitutional:       General: He is not in acute distress.     Appearance: Normal appearance. He is not ill-appearing.   HENT:      Head: Normocephalic and atraumatic.      Mouth/Throat:      Mouth: Mucous membranes are moist.      Pharynx: Oropharynx is clear.   Eyes:      Extraocular Movements: Extraocular movements intact.      Conjunctiva/sclera: Conjunctivae normal.      Pupils: Pupils are equal, round, and reactive to light.   Cardiovascular:      Rate and Rhythm: Normal rate and regular rhythm.      Pulses: Normal pulses.      Heart sounds: Normal heart sounds. No murmur heard.  Pulmonary:      Effort: Pulmonary effort is normal. No respiratory distress.      Breath sounds: Normal breath sounds. No wheezing or rales.   Abdominal:      General: Bowel sounds are normal. There is no distension.      Palpations: Abdomen is soft.       Tenderness: There is no abdominal tenderness.   Musculoskeletal:         General: Normal range of motion.      Cervical back: Normal range of motion and neck supple.      Right lower leg: No edema.      Left lower leg: No edema.   Skin:     General: Skin is warm and dry.   Neurological:      General: No focal deficit present.      Mental Status: He is alert and oriented to person, place, and time. Mental status is at baseline.   Psychiatric:         Behavior: Behavior normal.         Thought Content: Thought content normal.         CRANIAL NERVES     CN III, IV, VI   Pupils are equal, round, and reactive to light.     Significant Labs: All pertinent labs within the past 24 hours have been reviewed.  CBC:   Recent Labs   Lab 04/05/22  0734   WBC 19.61*   HGB 17.0   HCT 49.3        CMP:   Recent Labs   Lab 04/05/22  0734      K 3.2*      CO2 20*   *   BUN 25*   CREATININE 1.6*   CALCIUM 10.5   PROT 6.9   ALBUMIN 4.0   BILITOT 0.4   ALKPHOS 67   AST 22   ALT 25   ANIONGAP 15   EGFRNONAA 45*         Significant Imaging: I have reviewed all pertinent imaging results/findings within the past 24 hours.

## 2022-04-05 NOTE — ED NOTES
Pt resting in bed at this time, no signs of acute distress noted. Pt alert and oriented, VSS. Pt requesting pain medicine and rates pain at a 6/10 at this time.

## 2022-04-05 NOTE — ED NOTES
Pt resting quietly on stretcher with eyes closed; opens eyes to verbal stimuli.  Pt remains on continuous cardiac and pulse ox monitoring with non-invasive blood pressure to cycle every 30 minutes. VS stable; NSR noted. Pt reporting total relief in pain since receiving medication; no acute distress or discomfort reported or observed.  Pt denies restroom needs at this time; urinal remains at bedside. Pt is able to reposition self on stretcher. Bed locked in lowest position; side rails up and locked x 2; call light, bedside table, and personal belongings within reach. Room assessed for safety measures and cleanliness; no action needed at this time; wife remains at bedside. Plan of care discussed; pt awaiting update from provider.  Pt instructed to alert nurse for assistance and before attempting to get out of bed; verbalizes understanding. Pt denies needs or complaints at this time; will continue to monitor.

## 2022-04-05 NOTE — HPI
"Mr. Barragan is a 61-year-old male with a past medical history of cholecystectomy, type 2 diabetes, hypertension, tobacco abuse, remote small bowel obstruction, who presented to the ED at [facility] complaints of abdominal pain with accompanying nausea vomiting.  The symptoms started early this morning with excruciating generalized abdominal pain, although patient notes he has low pain tolerance and .  Multiple episodes of emesis productive of yellow stomach acid, nonbloody/nonbilious.  His last bowel movement was yesterday evening and it was normal formed and brown.  Patient denies chest pain, shortness of breath, fever, chills.  In the ED, he received antiemetics, analgesics, and IV fluids.  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  Known celiac artery dissection flap is not well seen on this study due to phase of contrast."  At the time of my evaluation, patient's symptoms had fully resolved, he stated "I'm good, no pain, no nausea".  He is being admitted to Hospital Medicine observation for General surgery consult and CTA in the morning to confirm celiac artery aneurysm is stable.  "

## 2022-04-05 NOTE — ED TRIAGE NOTES
Pt with severe abdominal pain that started this morning, pt with constant vomiting. Pt is unable to keep fluids down. Pt is alert and oriented, uncomfortable, moaning in pain. MD at bedside evaluating patient. Will continue to monitor.    [FreeTextEntry1] : Patient examined and evaluated at this time.\par Continue local wound care and offloading.\par Will auth for [facility].\par Patient to follow up in 1 week.\par Spent 30 minutes for patient care and medical decision making.\par

## 2022-04-05 NOTE — ED PROVIDER NOTES
"I, Brianna Isaac, scribed for, and in the presence of, Marielos Cunningham MD. I performed the scribed service and the documentation accurately describes the services I performed. I attest to the accuracy of the note.     CHIEF COMPLAINT  Chief Complaint   Patient presents with    Abdominal Pain     Pt c.o abd pain with vomiting onset this AM.  Pt states he has had this in past dx "with some kind of obstruction"  AAO x 3  skin w.d mucus membrane m/p.  LBM last night states normal.  Pt moaning loudly in triage attempting to get vitals.  Pt actively vomiting in triage.        HPI  Emery Barragan is a 61 y.o. male with PMHx of SBO and gallbladder surgery who presents with severe abdominal discomfort that began this morning. He states that it began when he was attempting to go to the bathroom and he began experiencing nausea and vomiting. He reports that he believes he is throwing up stool. He states that his last bowel movement was yesterday. He denies chest pain or shortness of breath. The pain is severe. Nothing alleviates or exacerbates the pain. He has not taken any medications thus far.       This is the extent of the patient's complaints at this time.       PAST MEDICAL HISTORY  Past Medical History:   Diagnosis Date    Chronic back pain     Diabetes mellitus     Gallstones     Glaucoma (increased eye pressure)     Hepatitis C     Hypertension        CURRENT MEDICATIONS      Current Facility-Administered Medications:     melatonin tablet 6 mg, 6 mg, Oral, Nightly PRN, Marielos Cunningham MD    sodium chloride 0.9% flush 10 mL, 10 mL, Intravenous, PRN, Marielos Cunningham MD    Current Outpatient Medications:     amlodipine (NORVASC) 10 MG tablet, Take 10 mg by mouth once daily., Disp: , Rfl:     carvediloL (COREG) 25 MG tablet, Take 25 mg by mouth 2 (two) times daily., Disp: , Rfl:     enalapril (VASOTEC) 20 MG tablet, Take 20 mg by mouth once daily., Disp: , Rfl:     losartan-hydrochlorothiazide 100-25 mg " (HYZAAR) 100-25 mg per tablet, Take 1 tablet by mouth every morning., Disp: , Rfl:     NOVOLOG U-100 INSULIN ASPART 100 unit/mL injection, Inject 5-10 Units into the skin 3 (three) times daily with meals., Disp: , Rfl:     omeprazole (PRILOSEC) 40 MG capsule, SMARTSI Capsule(s) By Mouth Every Evening, Disp: , Rfl:     insulin glargine (LANTUS) 100 unit/mL injection, Inject 40 Units into the skin every morning. , Disp: , Rfl:     ALLERGIES    Review of patient's allergies indicates:  No Known Allergies    SURGICAL HISTORY    Past Surgical History:   Procedure Laterality Date    CHOLECYSTECTOMY      COLONOSCOPY N/A 2016    Procedure: COLONOSCOPY;  Surgeon: Andre Sanchez MD;  Location: [facility];  Service: Endoscopy;  Laterality: N/A;    ESOPHAGOGASTRODUODENOSCOPY N/A 2019    Procedure: ESOPHAGOGASTRODUODENOSCOPY (EGD);  Surgeon: Mike Garcia MD;  Location: [facility];  Service: Endoscopy;  Laterality: N/A;       SOCIAL HISTORY    Social History     Socioeconomic History    Marital status:    Tobacco Use    Smoking status: Current Every Day Smoker     Packs/day: 1.00     Types: Cigarettes    Smokeless tobacco: Never Used   Substance and Sexual Activity    Alcohol use: No    Drug use: No    Sexual activity: Yes     Partners: Female       FAMILY HISTORY    Family History   Problem Relation Age of Onset    Diabetes Mother     Kidney disease Mother     Heart disease Mother        REVIEW OF SYSTEMS    Constitutional:  Appears uncomfortable. Moaning in pain.    Eyes:  No redness, pain, or discharge.   HENT:  No ear pain, no sudden onset headache, no throat pain.  Respiratory:  No wheezing, cough, or shortness of breath.   Cardiovascular:  No chest pain or palpitations.  GI: Abdominal pain. Nausea. Vomiting.  : No dysuria or discharge.  Musculoskeletal:  No injury; full range of motion.   Skin:  No rash, abscess, or laceration.  Psychiatric: No suicidal ideations, homicidal  "ideations, auditory or visual hallucinations  Neurologic:  No focal weakness or sensory changes.   All Systems otherwise negative except as noted in the Review of Systems and History of Present Illness.    PHYSICAL EXAM    VITAL SIGNS: BP (!) 146/87   Pulse 60   Temp 98.3 °F (36.8 °C)   Resp 20   Ht 5' 9" (1.753 m)   Wt 95.3 kg (210 lb)   SpO2 99%   BMI 31.01 kg/m²    Constitutional: ACutely distressed and ill appearing, eyes closed while talking   HENT:  Dry mucous membranes. Normocephalic, atraumatic.    Eyes:  PERRL, EOMI, conjunctiva normal.  Neck: Normal range of motion, no tenderness, supple.  Respiratory:  Nonlabored breathing with normal breath sounds; no respiratory distress.  Cardiovascular:  RRR with no pulse deficit.  GI:  Diffusely tender to even light palpation  Musculoskeletal: Normal ROM, no tenderness, injury, edema.  Integument:  Warm, dry skin without infection or injury.  Neurologic:  Normal motor, sensation with no focal deficit.  Psychiatric:  Affect normal, Judgment normal, Mood normal. No SI, HI and not gravely disabled.    LABS  Pertinent labs reviewed. (See chart for details)   Labs Reviewed   COMPREHENSIVE METABOLIC PANEL - Abnormal; Notable for the following components:       Result Value    Potassium 3.2 (*)     CO2 20 (*)     Glucose 163 (*)     BUN 25 (*)     Creatinine 1.6 (*)     eGFR if  52 (*)     eGFR if non  45 (*)     All other components within normal limits   CBC W/ AUTO DIFFERENTIAL - Abnormal; Notable for the following components:    WBC 19.61 (*)     Gran # (ANC) 14.2 (*)     Immature Grans (Abs) 0.07 (*)     Mono # 1.4 (*)     All other components within normal limits   POCT GLUCOSE - Abnormal; Notable for the following components:    POCT Glucose 162 (*)     All other components within normal limits   LACTIC ACID, PLASMA   TROPONIN I   HIV 1 / 2 ANTIBODY    Narrative:     Release to patient->Immediate   SARS-COV-2 RDRP GENE   POCT " GLUCOSE MONITORING CONTINUOUS         EKG    Results for orders placed or performed during the hospital encounter of 04/05/22   EKG 12-lead    Collection Time: 04/05/22  7:31 AM    Narrative    Test Reason : R11.10,    Vent. Rate : 052 BPM     Atrial Rate : 052 BPM     P-R Int : 176 ms          QRS Dur : 096 ms      QT Int : 450 ms       P-R-T Axes : -01 033 023 degrees     QTc Int : 418 ms    Sinus bradycardia  Otherwise normal ECG      Referred By: [email or web address]   SELF           Confirmed By:      Sinus bradycardia. Rate of 52. Normal axis. Normal intervals. No ST/T wave changes.     EKG interpreted by ED MD     Sinus bradycardia rate of 52, normal axis and intervals and no acute ST changes    RADIOLOGY    CT Abdomen Pelvis With Contrast   Final Result      1. No acute abnormality of the abdomen or pelvis.   2. Prostatomegaly.   3. Nonspecific nodular thickening of the bilateral adrenal glands, similar to prior.   4. 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  Known celiac artery dissection flap is not well seen on this study due to phase of contrast.         Electronically signed by: Daniel Horta   Date:    04/05/2022   Time:    09:28           I agree with radiologist interpretation    PROCEDURES    Procedures    Medications   sodium chloride 0.9% flush 10 mL (has no administration in time range)   melatonin tablet 6 mg (has no administration in time range)   sodium chloride 0.9% bolus 1,000 mL (0 mLs Intravenous Stopped 4/5/22 1013)   haloperidol lactate injection 2 mg (2 mg Intramuscular Given 4/5/22 0739)   iohexoL (OMNIPAQUE 350) injection 100 mL (100 mLs Intravenous Given 4/5/22 0901)   HYDROmorphone injection 1 mg (1 mg Intravenous Given 4/5/22 0908)   piperacillin-tazobactam 4.5 g in dextrose 5 % 100 mL IVPB (ready to mix system) (0 g Intravenous Stopped 4/5/22 1223)   potassium chloride SA CR tablet 20 mEq (20 mEq Oral Given 4/5/22 1117)   0.9%  NaCl infusion (1,000 mLs Intravenous New Bag  4/5/22 1223)   sodium chloride 0.9% bolus 1,000 mL (0 mLs Intravenous Stopped 4/5/22 1220)   ondansetron injection 4 mg (4 mg Intravenous Given 4/5/22 1039)   HYDROmorphone injection 1 mg (1 mg Intravenous Given 4/5/22 1039)       ED COURSE & MEDICAL DECISION MAKING      Pertinent & Imaging studies reviewed. (See chart for details)    Differential Diagnosis: SBO, intussuseption, gastroenteritis, renal failure      ED Course as of 04/05/22 1338   Tue Apr 05, 2022   0852 CO2(!): 20 [MG]   0852 Glucose(!): 163 [MG]   0857 WBC(!): 19.61 [MG]   1104 11:04 AM  Admit to Dr. Billy with consult to general surgery for celiac artery aneurysm.  [MUSTAPHA]   1108 Spoke to Dr Pena' nurse to notify of consult, he is in the OR [MG]   1121 Dr Pena requests call to cardiology for timing for CT angiogram [MG]   1127 Sopke to Dr De Santiago who reviewed the CT, agrees with keeping patient here and getting CT angiogram tomorrow to reassess. Patient looks much more comfortable [MG]      ED Course User Index  [MUSTAPHA] Brianna Isaac  [MG] Marielos Cunningham MD         Discontinued Medications    ATROPINE-PHENOBARBITAL-SCOPOLAMINE-HYOSCYAMINE 16.2-0.1037 -0.0194 MG TAB    Take 1 tablet by mouth 3 (three) times daily as needed (abdominal pain).    BD INSULIN SYRINGE ULTRA-FINE 0.5 ML 31 GAUGE X 5/16 SYRG    INJECT 3 TIMES A DAY AS DIRECTED    CALCIUM CARBONATE (TUMS) 200 MG CALCIUM (500 MG) CHEWABLE TABLET    Take 2 tablets by mouth once daily.    DULOXETINE (CYMBALTA) 20 MG CAPSULE    Take 1 capsule (20 mg total) by mouth 2 (two) times daily.    FOLIC ACID/MULTIVIT-MIN/LUTEIN (CENTRUM SILVER ORAL)    Take 1 tablet by mouth once daily.    GABAPENTIN (NEURONTIN) 600 MG TABLET    Take 600 mg by mouth.    HYDROCHLOROTHIAZIDE (MICROZIDE) 12.5 MG CAPSULE    Take 12.5 mg by mouth once daily.    IBUPROFEN (ADVIL,MOTRIN) 600 MG TABLET    Take 1 tablet (600 mg total) by mouth every 6 (six) hours as needed for Pain.    INSULIN LISPRO 100 UNIT/ML INJECTION     Sliding Scale starting at 2 units    LATANOPROST 0.005 % OPHTHALMIC SOLUTION    Place 1 drop into both eyes every evening.    ONDANSETRON (ZOFRAN) 4 MG TABLET    Take 1 tablet (4 mg total) by mouth every 6 (six) hours as needed for Nausea.    ONETOUCH ULTRA TEST STRP    TEST TWICE DAILY.    PANTOPRAZOLE (PROTONIX) 40 MG TABLET    Take 1 tablet (40 mg total) by mouth 2 (two) times daily.    PROMETHAZINE (PHENERGAN) 25 MG TABLET    Take 1 tablet (25 mg total) by mouth every 6 (six) hours as needed for Nausea (vomiting).       New Prescriptions    No medications on file         OVERALL IMPRESSION:       This is a very ill-appearing 61-year-old male presenting emergency department with repeated vomiting, bilious emesis that he described as feculent.  CT scan relatively unremarkable though commenting on stable aneurysm, and unable to completely visualize for celiac artery dissection.  Patient has stable iliac artery aneurysm, and has had so since 2014 when he was last image vascularly with a CT angiogram.  I called and spoke with Dr. Pena, as well as Dr. De Santiago regarding this.  Patient's lactic is normal, was unable to do a CT angiogram given recent contrast and patient's symptoms have certainly resolved.  He does have a leukocytosis of 19, was covered empirically with IV Zosyn given IV fluids antiemetics and analgesics.  Will be admitted for observation and get repeat CT angiogram in the morning.    Consults to vascular surgery and cardiology regarding aneurysm      FINAL DIAGNOSIS  1. Nausea    2. Vomiting    3. Celiac artery aneurysm    4. Hypertension, unspecified type        DISPOSITION  Patient admitted in serious condition.    Critical care time spent with this patient (not including billable procedures) was 45 minutes.    Marielos Cunningham MD  Emergency Medicine  [facility]  4/5/2022 7:34 AM    I have reviewed the notes, assessments, and/or procedures performed by Brianna castillo and agree with  documentation of this patient    Please pardon typos or dictation errors, as this note was transcribed using [employer] direct dictation software.           Marielos Cunningham MD  04/05/22 9910       Marielos Cunningham MD  04/05/22 8824

## 2022-04-05 NOTE — H&P
"[facility] Medicine  History & Physical    Patient Name: Emery Barragan  MRN: [id number]  Patient Class: OP- Observation  Admission Date: 4/5/2022  Attending Physician: Melida Billy MD   Primary Care Provider: [facility]         Patient information was obtained from patient, past medical records and ER records.     Subjective:     Principal Problem:Celiac artery aneurysm    Chief Complaint:   Chief Complaint   Patient presents with    Abdominal Pain     Pt c.o abd pain with vomiting onset this AM.  Pt states he has had this in past dx "with some kind of obstruction"  AAO x 3  skin w.d mucus membrane m/p.  LBM last night states normal.  Pt moaning loudly in triage attempting to get vitals.  Pt actively vomiting in triage.         HPI: Mr. Barragan is a 61-year-old male with a past medical history of cholecystectomy, type 2 diabetes, hypertension, tobacco abuse, remote small bowel obstruction, who presented to the ED at [facility] complaints of abdominal pain with accompanying nausea vomiting.  The symptoms started early this morning with excruciating generalized abdominal pain, although patient notes he has low pain tolerance and .  Multiple episodes of emesis productive of yellow stomach acid, nonbloody/nonbilious.  His last bowel movement was yesterday evening and it was normal formed and brown.  Patient denies chest pain, shortness of breath, fever, chills.  In the ED, he received antiemetics, analgesics, and IV fluids.  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  Known celiac artery dissection flap is not well seen on this study due to phase of contrast."  At the time of my evaluation, patient's symptoms had fully resolved, he stated "I'm good, no pain, no nausea".  He is being admitted to Hospital Medicine observation for General surgery consult and CTA in the morning to confirm " celiac artery aneurysm is stable.      Past Medical History:   Diagnosis Date    Chronic back pain     Diabetes mellitus     Gallstones     Glaucoma (increased eye pressure)     Hepatitis C     Hypertension        Past Surgical History:   Procedure Laterality Date    CHOLECYSTECTOMY      COLONOSCOPY N/A 2016    Procedure: COLONOSCOPY;  Surgeon: Andre Sanchez MD;  Location: [facility];  Service: Endoscopy;  Laterality: N/A;    ESOPHAGOGASTRODUODENOSCOPY N/A 2019    Procedure: ESOPHAGOGASTRODUODENOSCOPY (EGD);  Surgeon: Mike Garcia MD;  Location: [facility];  Service: Endoscopy;  Laterality: N/A;       Review of patient's allergies indicates:  No Known Allergies    No current facility-administered medications on file prior to encounter.     Current Outpatient Medications on File Prior to Encounter   Medication Sig    amlodipine (NORVASC) 10 MG tablet Take 10 mg by mouth once daily.    carvediloL (COREG) 25 MG tablet Take 25 mg by mouth 2 (two) times daily.    insulin glargine (LANTUS) 100 unit/mL injection Inject 40 Units into the skin every morning.     losartan-hydrochlorothiazide 100-25 mg (HYZAAR) 100-25 mg per tablet Take 1 tablet by mouth every morning.    NOVOLOG U-100 INSULIN ASPART 100 unit/mL injection Inject 5-10 Units into the skin 3 (three) times daily with meals.    omeprazole (PRILOSEC) 40 MG capsule SMARTSI Capsule(s) By Mouth Every Evening    [DISCONTINUED] enalapril (VASOTEC) 20 MG tablet Take 20 mg by mouth once daily.    [DISCONTINUED] atropine-phenobarbital-scopolamine-hyoscyamine 16.2-0.1037 -0.0194 mg Tab Take 1 tablet by mouth 3 (three) times daily as needed (abdominal pain).    [DISCONTINUED] BD INSULIN SYRINGE ULTRA-FINE 0.5 mL 31 gauge x 5/16 Syrg INJECT 3 TIMES A DAY AS DIRECTED    [DISCONTINUED] calcium carbonate (TUMS) 200 mg calcium (500 mg) chewable tablet Take 2 tablets by mouth once daily.    [DISCONTINUED] DULoxetine (CYMBALTA) 20 MG capsule  Take 1 capsule (20 mg total) by mouth 2 (two) times daily.    [DISCONTINUED] folic acid/multivit-min/lutein (CENTRUM SILVER ORAL) Take 1 tablet by mouth once daily.    [DISCONTINUED] gabapentin (NEURONTIN) 600 MG tablet Take 600 mg by mouth.    [DISCONTINUED] hydrochlorothiazide (MICROZIDE) 12.5 mg capsule Take 12.5 mg by mouth once daily.    [DISCONTINUED] ibuprofen (ADVIL,MOTRIN) 600 MG tablet Take 1 tablet (600 mg total) by mouth every 6 (six) hours as needed for Pain.    [DISCONTINUED] insulin lispro 100 unit/mL injection Sliding Scale starting at 2 units    [DISCONTINUED] latanoprost 0.005 % ophthalmic solution Place 1 drop into both eyes every evening.    [DISCONTINUED] ondansetron (ZOFRAN) 4 MG tablet Take 1 tablet (4 mg total) by mouth every 6 (six) hours as needed for Nausea.    [DISCONTINUED] ONETOUCH ULTRA TEST Strp TEST TWICE DAILY.    [DISCONTINUED] pantoprazole (PROTONIX) 40 MG tablet Take 1 tablet (40 mg total) by mouth 2 (two) times daily.    [DISCONTINUED] promethazine (PHENERGAN) 25 MG tablet Take 1 tablet (25 mg total) by mouth every 6 (six) hours as needed for Nausea (vomiting).     Family History       Problem Relation (Age of Onset)    Diabetes Mother    Heart disease Mother    Kidney disease Mother          Tobacco Use    Smoking status: Current Every Day Smoker     Packs/day: 1.00     Types: Cigarettes    Smokeless tobacco: Never Used   Substance and Sexual Activity    Alcohol use: No    Drug use: No    Sexual activity: Yes     Partners: Female     Review of Systems   Constitutional:  Negative for activity change, chills, diaphoresis, fatigue and fever.   HENT:  Negative for congestion, dental problem, ear pain, rhinorrhea, sore throat, trouble swallowing and voice change.    Eyes:  Negative for visual disturbance.   Respiratory:  Negative for cough, chest tightness, shortness of breath and wheezing.    Cardiovascular:  Negative for chest pain, palpitations and leg swelling.    Gastrointestinal:  Positive for abdominal pain (resolved), nausea (resolved) and vomiting (resolved). Negative for abdominal distention, blood in stool, constipation and diarrhea.   Genitourinary:  Negative for difficulty urinating, dysuria, flank pain, frequency, hematuria and urgency.   Musculoskeletal:  Negative for arthralgias, back pain, gait problem, joint swelling, myalgias and neck pain.   Skin:  Negative for color change, rash and wound.   Neurological:  Negative for dizziness, syncope, speech difficulty, weakness, light-headedness, numbness and headaches.   Hematological:  Does not bruise/bleed easily.   Psychiatric/Behavioral:  Negative for confusion.    Objective:     Vital Signs (Most Recent):  Temp: 98.3 °F (36.8 °C) (04/05/22 1048)  Pulse: 60 (04/05/22 1332)  Resp: 20 (04/05/22 1039)  BP: (!) 146/87 (04/05/22 1332)  SpO2: 99 % (04/05/22 1332)   Vital Signs (24h Range):  Temp:  [98 °F (36.7 °C)-98.3 °F (36.8 °C)] 98.3 °F (36.8 °C)  Pulse:  [60-95] 60  Resp:  [18-20] 20  SpO2:  [94 %-100 %] 99 %  BP: (131-185)/() 146/87     Weight: 95.3 kg (210 lb)  Body mass index is 31.01 kg/m².    Physical Exam  Vitals and nursing note reviewed.   Constitutional:       General: He is not in acute distress.     Appearance: Normal appearance. He is not ill-appearing.   HENT:      Head: Normocephalic and atraumatic.      Mouth/Throat:      Mouth: Mucous membranes are moist.      Pharynx: Oropharynx is clear.   Eyes:      Extraocular Movements: Extraocular movements intact.      Conjunctiva/sclera: Conjunctivae normal.      Pupils: Pupils are equal, round, and reactive to light.   Cardiovascular:      Rate and Rhythm: Normal rate and regular rhythm.      Pulses: Normal pulses.      Heart sounds: Normal heart sounds. No murmur heard.  Pulmonary:      Effort: Pulmonary effort is normal. No respiratory distress.      Breath sounds: Normal breath sounds. No wheezing or rales.   Abdominal:      General: Bowel sounds  "are normal. There is no distension.      Palpations: Abdomen is soft.      Tenderness: There is no abdominal tenderness.   Musculoskeletal:         General: Normal range of motion.      Cervical back: Normal range of motion and neck supple.      Right lower leg: No edema.      Left lower leg: No edema.   Skin:     General: Skin is warm and dry.   Neurological:      General: No focal deficit present.      Mental Status: He is alert and oriented to person, place, and time. Mental status is at baseline.   Psychiatric:         Behavior: Behavior normal.         Thought Content: Thought content normal.         CRANIAL NERVES     CN III, IV, VI   Pupils are equal, round, and reactive to light.     Significant Labs: All pertinent labs within the past 24 hours have been reviewed.  CBC:   Recent Labs   Lab 04/05/22  0734   WBC 19.61*   HGB 17.0   HCT 49.3        CMP:   Recent Labs   Lab 04/05/22  0734      K 3.2*      CO2 20*   *   BUN 25*   CREATININE 1.6*   CALCIUM 10.5   PROT 6.9   ALBUMIN 4.0   BILITOT 0.4   ALKPHOS 67   AST 22   ALT 25   ANIONGAP 15   EGFRNONAA 45*         Significant Imaging: I have reviewed all pertinent imaging results/findings within the past 24 hours.    Assessment/Plan:     Celiac artery aneurysm  - CT abdomen pelvis with "Stable aneurysmal dilation of the right common iliac artery.  Known celiac artery dissection flap is not well seen on this study due to phase of contrast. "  - general surgery consulted  - plan is CTA tomorrow for better visualization of celiac artery aneurysm to ensure it is stable    Abdominal pain  Nausea/vomiting  - acute onset this morning, now resolved  - CT abdomen pelvis with no acute process; etiology unclear, unlikely related to aneurysm  - leukocytosis likely reactive, trend  - clear liquid diet, advanced as tolerated    CKD3  - No labs on file since 2021, but baseline creatine appears to be 1.5-1.9  - current creatinine 1.6, however BUN " mildly elevated at 25 likely due to mild hypovolemia from vomiting  - s/p 3L normal saline in the ED and now tolerating p.o.  - monitor    Hypertension  - continue home amlodipine, carvedilol  - hold home losartan/HCTZ given mild hypovolemia as above, can likely resume tomorrow    Type 2 diabetes mellitus  - home regimen:  Insulin glargine 40 units daily, NovoLog 1-5 units t.i.d. with meals  - hospital regimen:  Insulin Levemir 10 units b.i.d., moderate dose sliding scale insulin t.i.d. with meals  - POCT glucose a.c./HS      VTE Risk Mitigation (From admission, onward)         Ordered     Reason for No Pharmacological VTE Prophylaxis  Once        Question:  Reasons:  Answer:  Risk of Bleeding    04/05/22 1410     IP VTE HIGH RISK PATIENT  Once         04/05/22 1410     Place sequential compression device  Until discontinued         04/05/22 1410     Place sequential compression device  Until discontinued         04/05/22 1104                   Malu Hankins PA-C  Department of Hospital Medicine   [facility] - Emergency Dept

## 2022-04-06 VITALS
WEIGHT: 210 LBS | HEIGHT: 69 IN | SYSTOLIC BLOOD PRESSURE: 159 MMHG | TEMPERATURE: 98 F | DIASTOLIC BLOOD PRESSURE: 109 MMHG | BODY MASS INDEX: 31.1 KG/M2 | OXYGEN SATURATION: 93 % | RESPIRATION RATE: 18 BRPM | HEART RATE: 56 BPM

## 2022-04-06 LAB
ANION GAP SERPL CALC-SCNC: 12 MMOL/L (ref 8–16)
BASOPHILS # BLD AUTO: 0.02 K/UL (ref 0–0.2)
BASOPHILS NFR BLD: 0.2 % (ref 0–1.9)
BUN SERPL-MCNC: 18 MG/DL (ref 8–23)
CALCIUM SERPL-MCNC: 9.2 MG/DL (ref 8.7–10.5)
CHLORIDE SERPL-SCNC: 108 MMOL/L (ref 95–110)
CO2 SERPL-SCNC: 25 MMOL/L (ref 23–29)
CREAT SERPL-MCNC: 1.4 MG/DL (ref 0.5–1.4)
DIFFERENTIAL METHOD: ABNORMAL
EOSINOPHIL # BLD AUTO: 0.1 K/UL (ref 0–0.5)
EOSINOPHIL NFR BLD: 0.5 % (ref 0–8)
ERYTHROCYTE [DISTWIDTH] IN BLOOD BY AUTOMATED COUNT: 13 % (ref 11.5–14.5)
EST. GFR  (AFRICAN AMERICAN): >60 ML/MIN/1.73 M^2
EST. GFR  (NON AFRICAN AMERICAN): 53 ML/MIN/1.73 M^2
GLUCOSE SERPL-MCNC: 79 MG/DL (ref 70–110)
HCT VFR BLD AUTO: 47.8 % (ref 40–54)
HGB BLD-MCNC: 16.5 G/DL (ref 14–18)
IMM GRANULOCYTES # BLD AUTO: 0.05 K/UL (ref 0–0.04)
IMM GRANULOCYTES NFR BLD AUTO: 0.4 % (ref 0–0.5)
LYMPHOCYTES # BLD AUTO: 2.9 K/UL (ref 1–4.8)
LYMPHOCYTES NFR BLD: 25 % (ref 18–48)
MAGNESIUM SERPL-MCNC: 1.4 MG/DL (ref 1.6–2.6)
MCH RBC QN AUTO: 29.6 PG (ref 27–31)
MCHC RBC AUTO-ENTMCNC: 34.5 G/DL (ref 32–36)
MCV RBC AUTO: 86 FL (ref 82–98)
MONOCYTES # BLD AUTO: 1 K/UL (ref 0.3–1)
MONOCYTES NFR BLD: 8.8 % (ref 4–15)
NEUTROPHILS # BLD AUTO: 7.6 K/UL (ref 1.8–7.7)
NEUTROPHILS NFR BLD: 65.1 % (ref 38–73)
NRBC BLD-RTO: 0 /100 WBC
PHOSPHATE SERPL-MCNC: 2.6 MG/DL (ref 2.7–4.5)
PLATELET # BLD AUTO: 147 K/UL (ref 150–450)
PMV BLD AUTO: 12 FL (ref 9.2–12.9)
POCT GLUCOSE: 102 MG/DL (ref 70–110)
POCT GLUCOSE: 151 MG/DL (ref 70–110)
POCT GLUCOSE: 155 MG/DL (ref 70–110)
POTASSIUM SERPL-SCNC: 3.2 MMOL/L (ref 3.5–5.1)
RBC # BLD AUTO: 5.57 M/UL (ref 4.6–6.2)
SODIUM SERPL-SCNC: 145 MMOL/L (ref 136–145)
WBC # BLD AUTO: 11.71 K/UL (ref 3.9–12.7)

## 2022-04-06 PROCEDURE — 63600175 PHARM REV CODE 636 W HCPCS: Performed by: PHYSICIAN ASSISTANT

## 2022-04-06 PROCEDURE — 25500020 PHARM REV CODE 255: Performed by: INTERNAL MEDICINE

## 2022-04-06 PROCEDURE — 96367 TX/PROPH/DG ADDL SEQ IV INF: CPT

## 2022-04-06 PROCEDURE — 80048 BASIC METABOLIC PNL TOTAL CA: CPT | Performed by: PHYSICIAN ASSISTANT

## 2022-04-06 PROCEDURE — 85025 COMPLETE CBC W/AUTO DIFF WBC: CPT | Performed by: PHYSICIAN ASSISTANT

## 2022-04-06 PROCEDURE — G0378 HOSPITAL OBSERVATION PER HR: HCPCS

## 2022-04-06 PROCEDURE — 96376 TX/PRO/DX INJ SAME DRUG ADON: CPT | Mod: 59

## 2022-04-06 PROCEDURE — 99217 PR OBSERVATION CARE DISCHARGE: CPT | Mod: ,,, | Performed by: PHYSICIAN ASSISTANT

## 2022-04-06 PROCEDURE — 36415 COLL VENOUS BLD VENIPUNCTURE: CPT | Performed by: PHYSICIAN ASSISTANT

## 2022-04-06 PROCEDURE — 83735 ASSAY OF MAGNESIUM: CPT | Performed by: PHYSICIAN ASSISTANT

## 2022-04-06 PROCEDURE — A4216 STERILE WATER/SALINE, 10 ML: HCPCS | Performed by: PHYSICIAN ASSISTANT

## 2022-04-06 PROCEDURE — 99217 PR OBSERVATION CARE DISCHARGE: ICD-10-PCS | Mod: ,,, | Performed by: PHYSICIAN ASSISTANT

## 2022-04-06 PROCEDURE — 25000003 PHARM REV CODE 250: Performed by: PHYSICIAN ASSISTANT

## 2022-04-06 PROCEDURE — 96366 THER/PROPH/DIAG IV INF ADDON: CPT

## 2022-04-06 PROCEDURE — 84100 ASSAY OF PHOSPHORUS: CPT | Performed by: PHYSICIAN ASSISTANT

## 2022-04-06 RX ORDER — LOSARTAN POTASSIUM 50 MG/1
100 TABLET ORAL DAILY
Status: DISCONTINUED | OUTPATIENT
Start: 2022-04-06 | End: 2022-04-06 | Stop reason: HOSPADM

## 2022-04-06 RX ORDER — MAGNESIUM SULFATE HEPTAHYDRATE 40 MG/ML
2 INJECTION, SOLUTION INTRAVENOUS ONCE
Status: COMPLETED | OUTPATIENT
Start: 2022-04-06 | End: 2022-04-06

## 2022-04-06 RX ORDER — SODIUM,POTASSIUM PHOSPHATES 280-250MG
1 POWDER IN PACKET (EA) ORAL
Status: DISCONTINUED | OUTPATIENT
Start: 2022-04-06 | End: 2022-04-06 | Stop reason: HOSPADM

## 2022-04-06 RX ORDER — POTASSIUM CHLORIDE 20 MEQ/1
40 TABLET, EXTENDED RELEASE ORAL ONCE
Status: COMPLETED | OUTPATIENT
Start: 2022-04-06 | End: 2022-04-06

## 2022-04-06 RX ORDER — TRAMADOL HYDROCHLORIDE 50 MG/1
50 TABLET ORAL EVERY 6 HOURS PRN
Qty: 15 TABLET | Refills: 0 | Status: SHIPPED | OUTPATIENT
Start: 2022-04-06 | End: 2022-04-28

## 2022-04-06 RX ADMIN — PANTOPRAZOLE SODIUM 40 MG: 40 TABLET, DELAYED RELEASE ORAL at 08:04

## 2022-04-06 RX ADMIN — AMLODIPINE BESYLATE 10 MG: 5 TABLET ORAL at 08:04

## 2022-04-06 RX ADMIN — IOHEXOL 100 ML: 350 INJECTION, SOLUTION INTRAVENOUS at 11:04

## 2022-04-06 RX ADMIN — MAGNESIUM SULFATE HEPTAHYDRATE 2 G: 40 INJECTION, SOLUTION INTRAVENOUS at 09:04

## 2022-04-06 RX ADMIN — LOSARTAN POTASSIUM 100 MG: 50 TABLET, FILM COATED ORAL at 02:04

## 2022-04-06 RX ADMIN — POTASSIUM CHLORIDE 40 MEQ: 1500 TABLET, EXTENDED RELEASE ORAL at 08:04

## 2022-04-06 RX ADMIN — HYDROCODONE BITARTRATE AND ACETAMINOPHEN 1 TABLET: 5; 325 TABLET ORAL at 04:04

## 2022-04-06 RX ADMIN — HYDROCODONE BITARTRATE AND ACETAMINOPHEN 1 TABLET: 5; 325 TABLET ORAL at 11:04

## 2022-04-06 RX ADMIN — CARVEDILOL 25 MG: 12.5 TABLET, FILM COATED ORAL at 08:04

## 2022-04-06 RX ADMIN — HYDROMORPHONE HYDROCHLORIDE 0.5 MG: 1 INJECTION, SOLUTION INTRAMUSCULAR; INTRAVENOUS; SUBCUTANEOUS at 07:04

## 2022-04-06 RX ADMIN — POTASSIUM & SODIUM PHOSPHATES POWDER PACK 280-160-250 MG 1 PACKET: 280-160-250 PACK at 01:04

## 2022-04-06 RX ADMIN — Medication 10 ML: at 09:04

## 2022-04-06 RX ADMIN — HYDROMORPHONE HYDROCHLORIDE 0.5 MG: 1 INJECTION, SOLUTION INTRAMUSCULAR; INTRAVENOUS; SUBCUTANEOUS at 01:04

## 2022-04-06 RX ADMIN — ONDANSETRON 4 MG: 2 INJECTION INTRAMUSCULAR; INTRAVENOUS at 07:04

## 2022-04-06 NOTE — PLAN OF CARE
POC reviewed w/ pt and purposeful rounding complete. Meds administered per MAR. PRN pain meds administered for c/o abd pain w/ relief obtained. VSS on RA. Pt AAOx4 and ambulatory. Pt denies needs at this time; will continue to monitor.

## 2022-04-06 NOTE — CONSULTS
[facility] Surg ([place])  Consult Note      Date of Consultation:4/5/2022    Reason for Consult:  Abdominal pain  SUBJECTIVE:     History of Present Illness:  P [age]-year-old male he was sudden onset of severe abdominal pain.  A this was colicky in nature and associated with nausea and vomiting.  The patient denies fever, chills, diarrhea, constipation.  Patient has had similar episodes in the past.  Previous history of cholecystectomy, non insulin-dependent diabetes mellitus, hypertension, tobacco use, hepatitis C treated with Harvoni.  The patient has a known aneurysm of the iliac artery and has a previous dissection of the celiac artery.  Since admission the patient's pain has resolved and he is no longer nauseated or vomiting.      Past Medical History:   Diagnosis Date    Chronic back pain     Diabetes mellitus     Gallstones     Glaucoma (increased eye pressure)     Hepatitis C     Hypertension      Past Surgical History:   Procedure Laterality Date    CHOLECYSTECTOMY      COLONOSCOPY N/A 4/27/2016    Procedure: COLONOSCOPY;  Surgeon: Andre Sanchez MD;  Location: [facility];  Service: Endoscopy;  Laterality: N/A;    ESOPHAGOGASTRODUODENOSCOPY N/A 8/9/2019    Procedure: ESOPHAGOGASTRODUODENOSCOPY (EGD);  Surgeon: Mike Garcia MD;  Location: [facility];  Service: Endoscopy;  Laterality: N/A;     Family History   Problem Relation Age of Onset    Diabetes Mother     Kidney disease Mother     Heart disease Mother      Social History     Tobacco Use    Smoking status: Current Every Day Smoker     Packs/day: 1.00     Types: Cigarettes    Smokeless tobacco: Never Used   Substance Use Topics    Alcohol use: No    Drug use: No       Review of patient's allergies indicates:  No Known Allergies    Review of Systems:  Constitutional: No fever or chills, no night sweats, diaphoresis  Respiratory: No cough or shortness of breath with no hemoptysis, no sputum production  Cardiovascular: No chest pain  or palpitations, no dyspnea on exertion, no orthopnea  Gastrointestinal:  See HPI  Neurological: No confusion or weakness, no syncope, no dizziness    Current Facility-Administered Medications   Medication    acetaminophen tablet 650 mg    amLODIPine tablet 10 mg    carvediloL tablet 25 mg    HYDROcodone-acetaminophen 5-325 mg per tablet 1 tablet    HYDROmorphone injection 0.5 mg    insulin aspart U-100 pen 0-5 Units    insulin detemir U-100 pen 10 Units    melatonin tablet 6 mg    naloxone 0.4 mg/mL injection 0.02 mg    ondansetron injection 4 mg    pantoprazole EC tablet 40 mg    polyethylene glycol packet 17 g    sodium chloride 0.9% flush 10 mL    sodium chloride 0.9% flush 10 mL       OBJECTIVE:     Physical Exam:  General appearance: Well developed, well nourished, no acute distress, not in extremis  Eyes:  Conjunctivae/corneas clear.  Extraocular movements intact, anicteric.  Lungs: Normal respiratory effort,   clear to auscultation bilaterally   Heart: Regular rate and rhythm, S1, S2 normal, no murmur, rub or aaron.  Abdomen: Soft, non-tender non-distended; bowel sounds normal; no masses,  no organomegaly  Extremities: No cyanosis or clubbing. No edema.  No tenderness  Skin: Skin color, texture, turgor normal. No rashes or lesions  Neurologic: Normal strength and tone. No focal numbness or weakness       Laboratory:  Recent Labs   Lab 04/05/22  0734   WBC 19.61*   RBC 5.75   HGB 17.0   HCT 49.3      MCV 86   MCH 29.6   MCHC 34.5     BMP:   Recent Labs   Lab 04/05/22  0734   *      K 3.2*      CO2 20*   BUN 25*   CREATININE 1.6*   CALCIUM 10.5     Lab Results   Component Value Date    CALCIUM 10.5 04/05/2022    PHOS 2.6 (L) 08/20/2019     BNP  No results for input(s): BNP, BNPTRIAGEBLO in the last 168 hours.No results found for: URICACIDNo results found for: IRON, TIBC, FERRITIN, SATURATEDIRO  Lab Results   Component Value Date    CALCIUM 10.5 04/05/2022    PHOS 2.6  (L) 08/20/2019       Diagnostic Results:  CT Abdomen Pelvis With Contrast   Final Result      1. No acute abnormality of the abdomen or pelvis.   2. Prostatomegaly.   3. Nonspecific nodular thickening of the bilateral adrenal glands, similar to prior.   4. 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  Known celiac artery dissection flap is not well seen on this study due to phase of contrast.         Electronically signed by: Daniel Horta   Date:    04/05/2022   Time:    09:28          Impression:  Abdominal pain, history of celiac artery dissection and right iliac artery aneurysm.  Both vascular lesions are stable.  Symptoms have resolved since admission    Plan:  Will follow with serial exam.  CT angio in a.m. to further evaluate celiac artery    Thank you for the opportunity of seeing Emery Barragan in consultation

## 2022-04-06 NOTE — PLAN OF CARE
04/06/22 1438   Final Note   Assessment Type Final Discharge Note   Anticipated Discharge Disposition Home   What phone number can be called within the next 1-3 days to see how you are doing after discharge? [phone]   Hospital Resources/Appts/Education Provided Appointments scheduled and added to AVS   Post-Acute Status   Discharge Delays (!) Waiting for Provider to Speak to Patient     Patient understands DC plan today; f/u with vascular-amb. Referrals placed by Malu BEAN.     Patient requests alternative pain medication other than tylenol; A message has been sent to PA to address with patient.        No CM services needed

## 2022-04-06 NOTE — NURSING
Patient is alert and oriented with significant other at bedside. Patient has tolerated yogurt and is drinking fluids. No complaints of pain at this time. Plan of care reviewed with patient and discharge instructions explained. IV discontinued. Patient will

## 2022-04-06 NOTE — PLAN OF CARE
[facility] - Med Surg ([place])  Initial Discharge Assessment       Primary Care Provider: [facility]    Admission Diagnosis: Nausea [R11.0]  Vomiting [R11.10]  Celiac artery aneurysm [I72.8]  Chest pain [R07.9]  Hypertension, unspecified type [I10]    Admission Date: 4/5/2022  Expected Discharge Date: 4/6/2022    Discharge Barriers Identified: (P) None    Payor: [employer] MANAGED MEDICARE / Plan: [employer] SNP (SPECIAL NEEDS PLAN) / Product Type: Medicare Advantage /     Extended Emergency Contact Information  Primary Emergency Contact: MikaelaMarika   [facility]  Home Phone: [phone]  Relation: Spouse    Discharge Plan A: (P) Home with family         [id number] - [place], [place] - [address]  [address] [facility] [address]  Phone: [phone] Fax: [phone]      Initial Assessment (most recent)       Adult Discharge Assessment - 04/06/22 1429          Discharge Assessment    Assessment Type Discharge Planning Assessment (P)      Confirmed/corrected address, phone number and insurance Yes (P)      Confirmed Demographics Correct on Facesheet (P)      Source of Information patient (P)      When was your last doctors appointment? -- (P)    PCP: Dr. Dorman at [facility] on [address] (last seen a few months ago)    Communicated ALETA with patient/caregiver Yes (P)      Reason For Admission admitted for n/v; celiac atery aneursym (P)      Lives With spouse (P)      Do you expect to return to your current living situation? Yes (P)      Do you have help at home or someone to help you manage your care at home? Yes (P)      Who are your caregiver(s) and their phone number(s)? wife- See MR (P)      Prior to hospitilization cognitive status: Alert/Oriented (P)      Current cognitive status: Alert/Oriented (P)      Walking or Climbing Stairs Difficulty none (P)      Dressing/Bathing Difficulty none (P)      Patient currently being followed by outpatient case  management? No (P)      Do you currently have service(s) that help you manage your care at home? No (P)      Do you take prescription medications? Yes (P)      Do you have prescription coverage? Yes (P)      Coverage [employer] (P)      Do you have any problems affording any of your prescribed medications? No (P)      Is the patient taking medications as prescribed? yes (P)      Who is going to help you get home at discharge? spouse Marika (P)      How do you get to doctors appointments? family or friend will provide (P)      Are you on dialysis? No (P)      Do you take coumadin? No (P)      Discharge Plan A Home with family (P)      DME Needed Upon Discharge  none (P)      Discharge Plan discussed with: Patient (P)      Discharge Barriers Identified None (P)      SDOH -- (P)    SDOH complete       Relationship/Environment    Name(s) of Who Lives With Patient spouse (P)                         Patient understands DC plan today; f/u with vascular-amb. Referrals placed by Malu BEAN.    Patient requests alternative pain medication other than tylenol; A message has been sent to PA to address with patient.       No CM services needed

## 2022-04-06 NOTE — DISCHARGE SUMMARY
"[facility] Medicine  Discharge Summary      Patient Name: Emery Barragan  MRN: [id number]  Patient Class: OP- Observation  Admission Date: 4/5/2022  Hospital Length of Stay: 0 days  Discharge Date and Time:  04/06/2022 4:40 PM  Attending Physician: Melida Billy MD   Discharging Provider: Malu Hankins PA-C  Primary Care Provider: [facility]      HPI:   Mr. Barragan is a 61-year-old male with a past medical history of cholecystectomy, type 2 diabetes, hypertension, tobacco abuse, remote small bowel obstruction, who presented to the ED at [facility] complaints of abdominal pain with accompanying nausea vomiting.  The symptoms started early this morning with excruciating generalized abdominal pain, although patient notes he has low pain tolerance and .  Multiple episodes of emesis productive of yellow stomach acid, nonbloody/nonbilious.  His last bowel movement was yesterday evening and it was normal formed and brown.  Patient denies chest pain, shortness of breath, fever, chills.  In the ED, he received antiemetics, analgesics, and IV fluids.  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  Known celiac artery dissection flap is not well seen on this study due to phase of contrast."  At the time of my evaluation, patient's symptoms had fully resolved, he stated "I'm good, no pain, no nausea".  He is being admitted to Hospital Medicine observation for General surgery consult and CTA in the morning to confirm celiac artery aneurysm is stable.            Hospital Course:      Celiac artery aneurysm  Right iliac artery aneurysm  - CT abdomen pelvis: "Stable aneurysmal dilation of the right common iliac artery.  Known celiac artery dissection flap is not well seen on this study due to phase of contrast. "  - CTA abdomen pelvis:  "celiac artery remains abnormal with aneurysmal dilatation " "measuring up to 1.7 cm, slightly increased compared to remote prior exam....There is stable right common iliac aneurysm measuring up to 2.1 cm.  There is a dissection of the right external iliac artery similar to prior exams. "  - general surgery consulted, discussed day of discharge, no need for urgent intervention as aneurysms are stable from last exam in 2014  - stressed to patient importance following up with vascular surgery for continued monitoring/intervention if/when indicated; ambulatory referral vascular surgery placed     Abdominal pain  Nausea/vomiting  - CT abdomen pelvis with no acute process;  - pain was unlikely related to aneurysm (which is stable as above), etiology not totally clear.   leukocytosis likely reactive and resolved  - patient still reported some intermittent "sharp" pains but significantly improved and exam benign (soft, no TTP); nausea and vomiting resolved and tolerating diet day of discharge without issue  - return precautions given     CKD3  Hypomagnesemia   Hypophosphatemia  Hypokalemia  - No labs on file since 2021, but baseline creatine appears to be 1.5-1.9  - creatinine stable and at baseline day of discharge  - electrolytes derangements due to GI losses/decreased oral intake, repleted and now tolerating po     Hypertension  - continue home amlodipine, carvedilol  - initially held home losartan/HCTZ given mild hypovolemia as above, but patient was adequately volume resuscitated and blood pressure trending up, resumed     Type 2 diabetes mellitus   - resume home regimen:  Insulin glargine 40 units daily, NovoLog 1-5 units t.i.d. with meals       Patient seen and examined by me on discharge day.  Questions were sought and answered to patient's satisfaction.  Patient (and family) aware and in agreement with discharge plan.    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    Specialty: Vascular Surgery  Why: A referral has been placed for you to see vascular surgery regarding the aneurysms in your abdomen.  They have been present for years and are stable, but it is VERY important you establish care with vascular surgery as you may need to have intervention to keep them stable.  They should call you to schedule appointment, but please contact them if you do not hear from them.  Contact information:  Kimi Herrera  [facility] [address]  [phone]                     Patient Instructions:      Ambulatory referral/consult to Vascular Surgery   Standing Status: Future   Referral Priority: Routine Referral Type: Consultation   Referral Reason: Specialty Services Required   Requested Specialty: Vascular Surgery   Number of Visits Requested: 1     Diet diabetic     Diet Cardiac     Notify your health care provider if you experience any of the following:  temperature >100.4     Notify your health care provider if you experience any of the following:  persistent nausea and vomiting or diarrhea     Notify your health care provider if you experience any of the following:  severe uncontrolled pain     Activity as tolerated       Significant Diagnostic Studies: Labs:   CMP   Recent Labs   Lab 04/05/22  0734 04/06/22  0437    145   K 3.2* 3.2*    108   CO2 20* 25   * 79   BUN 25* 18   CREATININE 1.6* 1.4   CALCIUM 10.5 9.2   PROT 6.9  --    ALBUMIN  4.0  --    BILITOT 0.4  --    ALKPHOS 67  --    AST 22  --    ALT 25  --    ANIONGAP 15 12   ESTGFRAFRICA 52* >60   EGFRNONAA 45* 53*   , CBC   Recent Labs   Lab 22  0734 22  0437   WBC 19.61* 11.71   HGB 17.0 16.5   HCT 49.3 47.8    147*    and All labs within the past 24 hours have been reviewed    Pending Diagnostic Studies:     None         Medications:  Reconciled Home Medications:      Medication List      START taking these medications    traMADoL 50 mg tablet  Commonly known as: ULTRAM  Take 1 tablet (50 mg total) by mouth every 6 (six) hours as needed for Pain.        CONTINUE taking these medications    amLODIPine 10 MG tablet  Commonly known as: NORVASC  Take 10 mg by mouth once daily.     carvediloL 25 MG tablet  Commonly known as: COREG  Take 25 mg by mouth 2 (two) times daily.     insulin glargine 100 unit/mL injection  Commonly known as: Lantus  Inject 40 Units into the skin every morning.     losartan-hydrochlorothiazide 100-25 mg 100-25 mg per tablet  Commonly known as: HYZAAR  Take 1 tablet by mouth every morning.     NovoLOG U-100 Insulin aspart 100 unit/mL injection  Generic drug: insulin aspart U-100  Inject 5-10 Units into the skin 3 (three) times daily with meals.     omeprazole 40 MG capsule  Commonly known as: PRILOSEC  SMARTSI Capsule(s) By Mouth Every Evening        STOP taking these medications    enalapril 20 MG tablet  Commonly known as: VASOTEC            Indwelling Lines/Drains at time of discharge:   Lines/Drains/Airways     None                 Time spent on the discharge of patient: 45 minutes         Malu Hankins PA-C  Department of Hospital Medicine  [facility]

## 2022-04-07 ENCOUNTER — TELEPHONE (OUTPATIENT)
Dept: ADMINISTRATIVE | Facility: OTHER | Age: 62
End: 2022-04-07
Payer: MEDICARE

## 2022-04-07 NOTE — TELEPHONE ENCOUNTER
Left voice message for patient to return call to schedule appointment from referral to Vascular Surgery.  Karime SOMMERS [phone]

## 2022-04-28 ENCOUNTER — INITIAL CONSULT (OUTPATIENT)
Dept: VASCULAR SURGERY | Facility: CLINIC | Age: 62
End: 2022-04-28
Attending: SURGERY
Payer: MEDICARE

## 2022-04-28 VITALS
SYSTOLIC BLOOD PRESSURE: 133 MMHG | WEIGHT: 185.19 LBS | HEART RATE: 53 BPM | BODY MASS INDEX: 27.43 KG/M2 | TEMPERATURE: 99 F | DIASTOLIC BLOOD PRESSURE: 94 MMHG | HEIGHT: 69 IN

## 2022-04-28 DIAGNOSIS — I72.8 CELIAC ARTERY ANEURYSM: ICD-10-CM

## 2022-04-28 PROCEDURE — 99999 PR PBB SHADOW E&M-EST. PATIENT-LVL III: CPT | Mod: PBBFAC,,, | Performed by: SURGERY

## 2022-04-28 PROCEDURE — 99204 PR OFFICE/OUTPT VISIT, NEW, LEVL IV, 45-59 MIN: ICD-10-PCS | Mod: S$GLB,,, | Performed by: SURGERY

## 2022-04-28 PROCEDURE — 99999 PR PBB SHADOW E&M-EST. PATIENT-LVL III: ICD-10-PCS | Mod: PBBFAC,,, | Performed by: SURGERY

## 2022-04-28 PROCEDURE — 99204 OFFICE O/P NEW MOD 45 MIN: CPT | Mod: S$GLB,,, | Performed by: SURGERY

## 2022-04-28 NOTE — PROGRESS NOTES
Emery Barragan  04/28/2022    HPI:  Patient is a 61 y.o. male with a  past medical history of cholecystectomy, type 2 diabetes, hypertension, tobacco abuse, remote small bowel obstruction, who is here for evaluation of celiac artery aneurysm.  He presented to the ED at [facility] complaints of abdominal pain with accompanying nausea vomiting.  CT abdomen pelvis was performed which revealed Aneurysmal dilatation of the celiac artery measuring up to 1.7 cm.  He is aware of the aneurysm.  It dates back as early as 2014 where CTA showed that he had a celiac artery aneurysm measuring 1.5 cm. He was seen at [facility] in the emergency department general surgery was consulted and stated that no acute intervention needed to be done.  He now presents vascular surgery for possible intervention or follow-up for surveillance.    no MI/stroke  Tobacco use: yes, 1ppd    Past Medical History:   Diagnosis Date    Chronic back pain     Diabetes mellitus     Gallstones     Glaucoma (increased eye pressure)     Hepatitis C     Hypertension      Past Surgical History:   Procedure Laterality Date    CHOLECYSTECTOMY      COLONOSCOPY N/A 4/27/2016    Procedure: COLONOSCOPY;  Surgeon: Andre Sanchez MD;  Location: [facility];  Service: Endoscopy;  Laterality: N/A;    ESOPHAGOGASTRODUODENOSCOPY N/A 8/9/2019    Procedure: ESOPHAGOGASTRODUODENOSCOPY (EGD);  Surgeon: Mike Garcia MD;  Location: [facility];  Service: Endoscopy;  Laterality: N/A;     Family History   Problem Relation Age of Onset    Diabetes Mother     Kidney disease Mother     Heart disease Mother      Social History     Socioeconomic History    Marital status:    Tobacco Use    Smoking status: Current Every Day Smoker     Packs/day: 1.00     Types: Cigarettes    Smokeless tobacco: Never Used   Substance and Sexual Activity    Alcohol use: No    Drug use: No    Sexual activity: Yes     Partners: Female     Social Determinants of Health      Financial Resource Strain: Low Risk     Difficulty of Paying Living Expenses: Not hard at all   Food Insecurity: Unknown    Worried About Running Out of Food in the Last Year: Never true   Transportation Needs: No Transportation Needs    Lack of Transportation (Medical): No    Lack of Transportation (Non-Medical): No   Physical Activity: Insufficiently Active    Days of Exercise per Week: 7 days    Minutes of Exercise per Session: 20 min   Stress: No Stress Concern Present    Feeling of Stress : Not at all   Social Connections: Socially Integrated    Frequency of Communication with Friends and Family: More than three times a week    Frequency of Social Gatherings with Friends and Family: More than three times a week    Attends Anabaptism Services: 1 to 4 times per year    Active Member of Clubs or Organizations: Yes    Attends Club or Organization Meetings: More than 4 times per year    Marital Status:    Housing Stability: Low Risk     Unable to Pay for Housing in the Last Year: No    Number of Places Lived in the Last Year: 1    Unstable Housing in the Last Year: No       Current Outpatient Medications:     amlodipine (NORVASC) 10 MG tablet, Take 10 mg by mouth once daily., Disp: , Rfl:     carvediloL (COREG) 25 MG tablet, Take 25 mg by mouth 2 (two) times daily., Disp: , Rfl:     insulin glargine (LANTUS) 100 unit/mL injection, Inject 40 Units into the skin every morning. , Disp: , Rfl:     losartan-hydrochlorothiazide 100-25 mg (HYZAAR) 100-25 mg per tablet, Take 1 tablet by mouth every morning., Disp: , Rfl:     NOVOLOG U-100 INSULIN ASPART 100 unit/mL injection, Inject 5-10 Units into the skin 3 (three) times daily with meals., Disp: , Rfl:     omeprazole (PRILOSEC) 40 MG capsule, SMARTSI Capsule(s) By Mouth Every Evening, Disp: , Rfl:     traMADoL (ULTRAM) 50 mg tablet, Take 1 tablet (50 mg total) by mouth every 6 (six) hours as needed for Pain., Disp: 15 tablet, Rfl:  0    REVIEW OF SYSTEMS:  General: negative; ENT: negative; Allergy and Immunology: negative; Hematological and Lymphatic: Negative; Endocrine: negative; Respiratory: no cough, shortness of breath, or wheezing; Cardiovascular: no chest pain or dyspnea on exertion; Gastrointestinal: no abdominal pain/back, change in bowel habits, or bloody stools; Genito-Urinary: no dysuria, trouble voiding, or hematuria; Musculoskeletal: negative  Neurological: no TIA or stroke symptoms; Psychiatric: no nervousness, anxiety or depression.    PHYSICAL EXAM:                General appearance:  Alert, well-appearing, and in no distress.  Oriented to person, place, and time   Neurological: Normal speech, no focal findings noted; CN II - XII grossly intact           Musculoskeletal: Digits/nail without cyanosis/clubbing.  Normal muscle strength/tone.                 Neck: Supple, no significant adenopathy; thyroid is not enlarged                  No carotid bruit can be auscultated                Chest:  Clear to auscultation, no wheezes, rales or rhonchi, symmetric air entry     No use of accessory muscles             Cardiac: Normal rate and regular rhythm, S1 and S2 normal; PMI non-displaced          Abdomen: Soft, nontender, nondistended, no masses or organomegaly     No rebound tenderness noted; bowel sounds normal     Pulsatile aortic mass is no palpable.     No groin adenopathy      Extremities:   2+ femoral pulses bilaterally     Bilateral pedal pulses palpable.     No pre-tibial edema     No ulcerations    LAB RESULTS:  Lab Results   Component Value Date    K 3.2 (L) 04/06/2022    K 3.2 (L) 04/05/2022    K 3.4 (L) 06/10/2021    CREATININE 1.4 04/06/2022    CREATININE 1.6 (H) 04/05/2022    CREATININE 1.9 (H) 06/10/2021     Lab Results   Component Value Date    WBC 11.71 04/06/2022    WBC 19.61 (H) 04/05/2022    WBC 10.39 06/10/2021    HCT 47.8 04/06/2022    HCT 49.3 04/05/2022    HCT 54.6 (H) 06/10/2021     (L) 04/06/2022      04/05/2022     06/10/2021     Lab Results   Component Value Date    HGBA1C 7.1 (H) 08/07/2019    HGBA1C 6.7 (H) 08/11/2018    HGBA1C 7.8 (H) 04/25/2016     IMAGING:  EXAMINATION:  CTA ABDOMEN AND PELVIS     CLINICAL HISTORY:  celiac artery aneurysm;     TECHNIQUE:  Noncontrast images of the abdomen and pelvis were obtained.  Subsequently 100 cc Omnipaque 350 intravenous contrast was utilized and CT a of the abdomen and pelvis was obtained.     COMPARISON:  Multiple prior studies, the most recent April 5, 2022.  More remote studies also reviewed.     FINDINGS:  Lung bases demonstrate no significant abnormality.     Liver demonstrates no mass. The gallbladder has been removed.  The common duct is unremarkable. There is no splenic enlargement.     The stomach is within normal limits. The pancreas demonstrates no mass.     There is diffuse bilateral adrenal gland thickening similar to prior exams.     Kidneys concentrate contrast satisfactorily without hydronephrosis or mass. There are bilateral renal hypodensities which likely reflects cysts.     There is no periaortic lymphadenopathy.  There is no pelvic lymphadenopathy.     The bladder contains high density, likely relating to contrast administered yesterday. The prostate is enlarged.     There is no bowel distension or inflammatory change around the bowel.     The aorta demonstrates no aneurysm.     The celiac artery remains abnormal with aneurysmal dilatation measuring up to 1.7 cm, slightly increased compared to remote prior exam.  Questionable short-segment dissection appears less conspicuous as compared to prior.     Superior mesenteric artery and renal arteries are unremarkable.     There is stable right common iliac aneurysm measuring up to 2.1 cm.  There is a dissection of the right external iliac artery similar to prior exams.     The osseous structures are unremarkable with age-appropriate degenerative  changes.     Impression:     Aneurysmal dilatation of the celiac artery measuring up to 1.7 cm.  Questionable short segment dissection appears less conspicuous as compared to prior studies.     Right iliac artery aneurysm measuring 2.1 cm.     Known dissection of right external iliac artery.    IMP/PLAN:     61 y.o. male with the above past medical history presenting with a known 1.7 cm celiac artery aneurysm which has only grown 0.2cm from his exam in 2014. Given the stable nature of his aneurysm, no acute intervention needs to be performed at this time.    - follow up with vascular surgery in 2 years with non contrast CT abdomen    Navdeep Tripathi MD  [facility]      STAFF ADDENDUM    I have reviewed the relevant data and the resident's assessment and agree with the findings and plan as outlined above.    Clement Estrada MD  Vascular & Endovascular Surgery

## 2022-05-16 LAB
BASOPHILS # BLD AUTO: 0.02 K/UL (ref 0–0.2)
BASOPHILS NFR BLD: 0.2 % (ref 0–1.9)
DIFFERENTIAL METHOD: ABNORMAL
EOSINOPHIL # BLD AUTO: 0.1 K/UL (ref 0–0.5)
EOSINOPHIL NFR BLD: 0.7 % (ref 0–8)
ERYTHROCYTE [DISTWIDTH] IN BLOOD BY AUTOMATED COUNT: 12.9 % (ref 11.5–14.5)
HCT VFR BLD AUTO: 50.4 % (ref 40–54)
HGB BLD-MCNC: 17.3 G/DL (ref 14–18)
IMM GRANULOCYTES # BLD AUTO: 0.04 K/UL (ref 0–0.04)
IMM GRANULOCYTES NFR BLD AUTO: 0.3 % (ref 0–0.5)
LYMPHOCYTES # BLD AUTO: 1.6 K/UL (ref 1–4.8)
LYMPHOCYTES NFR BLD: 12.6 % (ref 18–48)
MCH RBC QN AUTO: 29.5 PG (ref 27–31)
MCHC RBC AUTO-ENTMCNC: 34.3 G/DL (ref 32–36)
MCV RBC AUTO: 86 FL (ref 82–98)
MONOCYTES # BLD AUTO: 0.8 K/UL (ref 0.3–1)
MONOCYTES NFR BLD: 6.4 % (ref 4–15)
NEUTROPHILS # BLD AUTO: 10.2 K/UL (ref 1.8–7.7)
NEUTROPHILS NFR BLD: 79.8 % (ref 38–73)
NRBC BLD-RTO: 0 /100 WBC
PLATELET # BLD AUTO: 179 K/UL (ref 150–450)
PMV BLD AUTO: 11.8 FL (ref 9.2–12.9)
RBC # BLD AUTO: 5.86 M/UL (ref 4.6–6.2)
WBC # BLD AUTO: 12.81 K/UL (ref 3.9–12.7)

## 2022-05-16 PROCEDURE — 83690 ASSAY OF LIPASE: CPT | Performed by: EMERGENCY MEDICINE

## 2022-05-16 PROCEDURE — 93005 ELECTROCARDIOGRAM TRACING: CPT

## 2022-05-16 PROCEDURE — 96376 TX/PRO/DX INJ SAME DRUG ADON: CPT

## 2022-05-16 PROCEDURE — 84484 ASSAY OF TROPONIN QUANT: CPT | Performed by: EMERGENCY MEDICINE

## 2022-05-16 PROCEDURE — 85025 COMPLETE CBC W/AUTO DIFF WBC: CPT | Performed by: EMERGENCY MEDICINE

## 2022-05-16 PROCEDURE — 99285 EMERGENCY DEPT VISIT HI MDM: CPT | Mod: 25,CS

## 2022-05-16 PROCEDURE — 93010 EKG 12-LEAD: ICD-10-PCS | Mod: ,,, | Performed by: INTERNAL MEDICINE

## 2022-05-16 PROCEDURE — 93010 ELECTROCARDIOGRAM REPORT: CPT | Mod: ,,, | Performed by: INTERNAL MEDICINE

## 2022-05-16 PROCEDURE — 80053 COMPREHEN METABOLIC PANEL: CPT | Performed by: EMERGENCY MEDICINE

## 2022-05-17 ENCOUNTER — HOSPITAL ENCOUNTER (OUTPATIENT)
Facility: OTHER | Age: 62
Discharge: HOME | End: 2022-05-19
Attending: EMERGENCY MEDICINE | Admitting: INTERNAL MEDICINE
Payer: MEDICARE

## 2022-05-17 DIAGNOSIS — R10.31 ACUTE BILATERAL LOWER ABDOMINAL PAIN: ICD-10-CM

## 2022-05-17 DIAGNOSIS — N17.9 AKI (ACUTE KIDNEY INJURY): Primary | ICD-10-CM

## 2022-05-17 DIAGNOSIS — K85.00 IDIOPATHIC ACUTE PANCREATITIS, UNSPECIFIED COMPLICATION STATUS: ICD-10-CM

## 2022-05-17 DIAGNOSIS — Z79.4 TYPE 2 DIABETES MELLITUS WITH HYPERGLYCEMIA, WITH LONG-TERM CURRENT USE OF INSULIN: ICD-10-CM

## 2022-05-17 DIAGNOSIS — R10.32 ACUTE BILATERAL LOWER ABDOMINAL PAIN: ICD-10-CM

## 2022-05-17 DIAGNOSIS — R10.9 ABDOMINAL PAIN: ICD-10-CM

## 2022-05-17 DIAGNOSIS — R07.9 CHEST PAIN: ICD-10-CM

## 2022-05-17 DIAGNOSIS — R11.2 NAUSEA AND VOMITING IN ADULT: ICD-10-CM

## 2022-05-17 DIAGNOSIS — R11.2 INTRACTABLE VOMITING WITH NAUSEA: ICD-10-CM

## 2022-05-17 DIAGNOSIS — E11.65 TYPE 2 DIABETES MELLITUS WITH HYPERGLYCEMIA, WITH LONG-TERM CURRENT USE OF INSULIN: ICD-10-CM

## 2022-05-17 PROBLEM — J18.9 PNEUMONIA OF BOTH LOWER LOBES DUE TO INFECTIOUS ORGANISM: Status: RESOLVED | Noted: 2018-08-11 | Resolved: 2022-05-17

## 2022-05-17 PROBLEM — R10.30 LOWER ABDOMINAL PAIN: Status: ACTIVE | Noted: 2022-05-17

## 2022-05-17 LAB
ALBUMIN SERPL BCP-MCNC: 3.2 G/DL (ref 3.5–5.2)
ALBUMIN SERPL BCP-MCNC: 4 G/DL (ref 3.5–5.2)
ALP SERPL-CCNC: 58 U/L (ref 55–135)
ALP SERPL-CCNC: 66 U/L (ref 55–135)
ALT SERPL W/O P-5'-P-CCNC: 18 U/L (ref 10–44)
ALT SERPL W/O P-5'-P-CCNC: 19 U/L (ref 10–44)
AMPHET+METHAMPHET UR QL: NEGATIVE
ANION GAP SERPL CALC-SCNC: 11 MMOL/L (ref 8–16)
ANION GAP SERPL CALC-SCNC: 14 MMOL/L (ref 8–16)
AST SERPL-CCNC: 21 U/L (ref 10–40)
AST SERPL-CCNC: 21 U/L (ref 10–40)
BACTERIA #/AREA URNS HPF: ABNORMAL /HPF
BARBITURATES UR QL SCN>200 NG/ML: NEGATIVE
BASOPHILS # BLD AUTO: 0.02 K/UL (ref 0–0.2)
BASOPHILS NFR BLD: 0.2 % (ref 0–1.9)
BENZODIAZ UR QL SCN>200 NG/ML: NEGATIVE
BILIRUB SERPL-MCNC: 0.4 MG/DL (ref 0.1–1)
BILIRUB SERPL-MCNC: 0.4 MG/DL (ref 0.1–1)
BILIRUB UR QL STRIP: NEGATIVE
BUN SERPL-MCNC: 31 MG/DL (ref 8–23)
BUN SERPL-MCNC: 35 MG/DL (ref 8–23)
BZE UR QL SCN: NEGATIVE
CALCIUM SERPL-MCNC: 10.3 MG/DL (ref 8.7–10.5)
CALCIUM SERPL-MCNC: 8.7 MG/DL (ref 8.7–10.5)
CANNABINOIDS UR QL SCN: ABNORMAL
CHLORIDE SERPL-SCNC: 102 MMOL/L (ref 95–110)
CHLORIDE SERPL-SCNC: 107 MMOL/L (ref 95–110)
CLARITY UR: CLEAR
CO2 SERPL-SCNC: 21 MMOL/L (ref 23–29)
CO2 SERPL-SCNC: 26 MMOL/L (ref 23–29)
COLOR UR: YELLOW
CREAT SERPL-MCNC: 1.9 MG/DL (ref 0.5–1.4)
CREAT SERPL-MCNC: 2.5 MG/DL (ref 0.5–1.4)
CREAT UR-MCNC: 132.4 MG/DL (ref 23–375)
CTP QC/QA: YES
DIFFERENTIAL METHOD: ABNORMAL
EOSINOPHIL # BLD AUTO: 0.1 K/UL (ref 0–0.5)
EOSINOPHIL NFR BLD: 0.5 % (ref 0–8)
ERYTHROCYTE [DISTWIDTH] IN BLOOD BY AUTOMATED COUNT: 13 % (ref 11.5–14.5)
EST. GFR  (AFRICAN AMERICAN): 30 ML/MIN/1.73 M^2
EST. GFR  (AFRICAN AMERICAN): 42 ML/MIN/1.73 M^2
EST. GFR  (NON AFRICAN AMERICAN): 26 ML/MIN/1.73 M^2
EST. GFR  (NON AFRICAN AMERICAN): 37 ML/MIN/1.73 M^2
ESTIMATED AVG GLUCOSE: 160 MG/DL (ref 68–131)
ETHANOL UR-MCNC: <10 MG/DL
GLUCOSE SERPL-MCNC: 127 MG/DL (ref 70–110)
GLUCOSE SERPL-MCNC: 91 MG/DL (ref 70–110)
GLUCOSE UR QL STRIP: NEGATIVE
HBA1C MFR BLD: 7.2 % (ref 4–5.6)
HCT VFR BLD AUTO: 46.8 % (ref 40–54)
HGB BLD-MCNC: 16.2 G/DL (ref 14–18)
HGB UR QL STRIP: NEGATIVE
HYALINE CASTS #/AREA URNS LPF: 5 /LPF
IMM GRANULOCYTES # BLD AUTO: 0.03 K/UL (ref 0–0.04)
IMM GRANULOCYTES NFR BLD AUTO: 0.3 % (ref 0–0.5)
KETONES UR QL STRIP: ABNORMAL
LACTATE SERPL-SCNC: 0.8 MMOL/L (ref 0.5–2.2)
LEUKOCYTE ESTERASE UR QL STRIP: NEGATIVE
LIPASE SERPL-CCNC: 295 U/L (ref 4–60)
LYMPHOCYTES # BLD AUTO: 0.8 K/UL (ref 1–4.8)
LYMPHOCYTES NFR BLD: 8.1 % (ref 18–48)
MAGNESIUM SERPL-MCNC: 1.6 MG/DL (ref 1.6–2.6)
MCH RBC QN AUTO: 29.9 PG (ref 27–31)
MCHC RBC AUTO-ENTMCNC: 34.6 G/DL (ref 32–36)
MCV RBC AUTO: 86 FL (ref 82–98)
METHADONE UR QL SCN>300 NG/ML: NEGATIVE
MICROSCOPIC COMMENT: ABNORMAL
MONOCYTES # BLD AUTO: 0.5 K/UL (ref 0.3–1)
MONOCYTES NFR BLD: 5.3 % (ref 4–15)
NEUTROPHILS # BLD AUTO: 8.4 K/UL (ref 1.8–7.7)
NEUTROPHILS NFR BLD: 85.6 % (ref 38–73)
NITRITE UR QL STRIP: NEGATIVE
NRBC BLD-RTO: 0 /100 WBC
OPIATES UR QL SCN: ABNORMAL
PCP UR QL SCN>25 NG/ML: NEGATIVE
PH UR STRIP: 6 [PH] (ref 5–8)
PLATELET # BLD AUTO: 132 K/UL (ref 150–450)
PMV BLD AUTO: 11.4 FL (ref 9.2–12.9)
POCT GLUCOSE: 100 MG/DL (ref 70–110)
POCT GLUCOSE: 118 MG/DL (ref 70–110)
POCT GLUCOSE: 131 MG/DL (ref 70–110)
POCT GLUCOSE: 132 MG/DL (ref 70–110)
POCT GLUCOSE: 143 MG/DL (ref 70–110)
POTASSIUM SERPL-SCNC: 3.3 MMOL/L (ref 3.5–5.1)
POTASSIUM SERPL-SCNC: 3.5 MMOL/L (ref 3.5–5.1)
PROT SERPL-MCNC: 6.1 G/DL (ref 6–8.4)
PROT SERPL-MCNC: 7.1 G/DL (ref 6–8.4)
PROT UR QL STRIP: ABNORMAL
RBC # BLD AUTO: 5.42 M/UL (ref 4.6–6.2)
RBC #/AREA URNS HPF: 1 /HPF (ref 0–4)
SARS-COV-2 RDRP RESP QL NAA+PROBE: NEGATIVE
SODIUM SERPL-SCNC: 139 MMOL/L (ref 136–145)
SODIUM SERPL-SCNC: 142 MMOL/L (ref 136–145)
SP GR UR STRIP: 1.02 (ref 1–1.03)
SQUAMOUS #/AREA URNS HPF: 1 /HPF
TOXICOLOGY INFORMATION: ABNORMAL
TROPONIN I SERPL DL<=0.01 NG/ML-MCNC: 0.01 NG/ML (ref 0–0.03)
URN SPEC COLLECT METH UR: ABNORMAL
UROBILINOGEN UR STRIP-ACNC: NEGATIVE EU/DL
WBC # BLD AUTO: 9.83 K/UL (ref 3.9–12.7)
WBC #/AREA URNS HPF: 1 /HPF (ref 0–5)

## 2022-05-17 PROCEDURE — 96361 HYDRATE IV INFUSION ADD-ON: CPT

## 2022-05-17 PROCEDURE — 63600175 PHARM REV CODE 636 W HCPCS: Performed by: PHYSICIAN ASSISTANT

## 2022-05-17 PROCEDURE — 82962 GLUCOSE BLOOD TEST: CPT

## 2022-05-17 PROCEDURE — 96375 TX/PRO/DX INJ NEW DRUG ADDON: CPT

## 2022-05-17 PROCEDURE — 63600175 PHARM REV CODE 636 W HCPCS: Performed by: INTERNAL MEDICINE

## 2022-05-17 PROCEDURE — 83036 HEMOGLOBIN GLYCOSYLATED A1C: CPT | Performed by: INTERNAL MEDICINE

## 2022-05-17 PROCEDURE — 80053 COMPREHEN METABOLIC PANEL: CPT | Performed by: PHYSICIAN ASSISTANT

## 2022-05-17 PROCEDURE — 25000003 PHARM REV CODE 250: Performed by: INTERNAL MEDICINE

## 2022-05-17 PROCEDURE — 96374 THER/PROPH/DIAG INJ IV PUSH: CPT

## 2022-05-17 PROCEDURE — 80307 DRUG TEST PRSMV CHEM ANLYZR: CPT | Performed by: INTERNAL MEDICINE

## 2022-05-17 PROCEDURE — 94761 N-INVAS EAR/PLS OXIMETRY MLT: CPT

## 2022-05-17 PROCEDURE — A4216 STERILE WATER/SALINE, 10 ML: HCPCS | Performed by: PHYSICIAN ASSISTANT

## 2022-05-17 PROCEDURE — G0378 HOSPITAL OBSERVATION PER HR: HCPCS

## 2022-05-17 PROCEDURE — 85025 COMPLETE CBC W/AUTO DIFF WBC: CPT | Performed by: PHYSICIAN ASSISTANT

## 2022-05-17 PROCEDURE — 25000003 PHARM REV CODE 250: Performed by: PHYSICIAN ASSISTANT

## 2022-05-17 PROCEDURE — 81000 URINALYSIS NONAUTO W/SCOPE: CPT | Performed by: EMERGENCY MEDICINE

## 2022-05-17 PROCEDURE — 96376 TX/PRO/DX INJ SAME DRUG ADON: CPT

## 2022-05-17 PROCEDURE — 83605 ASSAY OF LACTIC ACID: CPT | Performed by: PHYSICIAN ASSISTANT

## 2022-05-17 PROCEDURE — 63600175 PHARM REV CODE 636 W HCPCS: Performed by: EMERGENCY MEDICINE

## 2022-05-17 PROCEDURE — 25000003 PHARM REV CODE 250: Performed by: EMERGENCY MEDICINE

## 2022-05-17 PROCEDURE — 99220 PR INITIAL OBSERVATION CARE,LEVL III: CPT | Mod: ,,, | Performed by: INTERNAL MEDICINE

## 2022-05-17 PROCEDURE — U0002 COVID-19 LAB TEST NON-CDC: HCPCS | Performed by: EMERGENCY MEDICINE

## 2022-05-17 PROCEDURE — 99220 PR INITIAL OBSERVATION CARE,LEVL III: ICD-10-PCS | Mod: ,,, | Performed by: INTERNAL MEDICINE

## 2022-05-17 PROCEDURE — 83735 ASSAY OF MAGNESIUM: CPT | Performed by: PHYSICIAN ASSISTANT

## 2022-05-17 RX ORDER — HYDROCODONE BITARTRATE AND ACETAMINOPHEN 5; 325 MG/1; MG/1
1 TABLET ORAL EVERY 6 HOURS PRN
Status: DISCONTINUED | OUTPATIENT
Start: 2022-05-17 | End: 2022-05-19 | Stop reason: HOSPADM

## 2022-05-17 RX ORDER — IBUPROFEN 200 MG
24 TABLET ORAL
Status: DISCONTINUED | OUTPATIENT
Start: 2022-05-17 | End: 2022-05-19 | Stop reason: HOSPADM

## 2022-05-17 RX ORDER — TALC
6 POWDER (GRAM) TOPICAL NIGHTLY PRN
Status: DISCONTINUED | OUTPATIENT
Start: 2022-05-17 | End: 2022-05-19 | Stop reason: HOSPADM

## 2022-05-17 RX ORDER — PANTOPRAZOLE SODIUM 40 MG/1
40 TABLET, DELAYED RELEASE ORAL 2 TIMES DAILY
Status: DISCONTINUED | OUTPATIENT
Start: 2022-05-17 | End: 2022-05-18

## 2022-05-17 RX ORDER — SODIUM CHLORIDE 9 MG/ML
INJECTION, SOLUTION INTRAVENOUS CONTINUOUS
Status: DISCONTINUED | OUTPATIENT
Start: 2022-05-17 | End: 2022-05-18

## 2022-05-17 RX ORDER — ONDANSETRON 2 MG/ML
4 INJECTION INTRAMUSCULAR; INTRAVENOUS
Status: COMPLETED | OUTPATIENT
Start: 2022-05-17 | End: 2022-05-17

## 2022-05-17 RX ORDER — SODIUM CHLORIDE 9 MG/ML
1000 INJECTION, SOLUTION INTRAVENOUS
Status: COMPLETED | OUTPATIENT
Start: 2022-05-17 | End: 2022-05-17

## 2022-05-17 RX ORDER — HYDROMORPHONE HYDROCHLORIDE 1 MG/ML
1 INJECTION, SOLUTION INTRAMUSCULAR; INTRAVENOUS; SUBCUTANEOUS EVERY 4 HOURS PRN
Status: DISCONTINUED | OUTPATIENT
Start: 2022-05-17 | End: 2022-05-19 | Stop reason: HOSPADM

## 2022-05-17 RX ORDER — ONDANSETRON 2 MG/ML
4 INJECTION INTRAMUSCULAR; INTRAVENOUS EVERY 6 HOURS PRN
Status: DISCONTINUED | OUTPATIENT
Start: 2022-05-17 | End: 2022-05-19 | Stop reason: HOSPADM

## 2022-05-17 RX ORDER — FAMOTIDINE 10 MG/ML
20 INJECTION INTRAVENOUS
Status: COMPLETED | OUTPATIENT
Start: 2022-05-17 | End: 2022-05-17

## 2022-05-17 RX ORDER — SODIUM CHLORIDE 0.9 % (FLUSH) 0.9 %
10 SYRINGE (ML) INJECTION EVERY 8 HOURS
Status: DISCONTINUED | OUTPATIENT
Start: 2022-05-17 | End: 2022-05-19 | Stop reason: HOSPADM

## 2022-05-17 RX ORDER — ACETAMINOPHEN 325 MG/1
650 TABLET ORAL EVERY 6 HOURS PRN
Status: DISCONTINUED | OUTPATIENT
Start: 2022-05-17 | End: 2022-05-19 | Stop reason: HOSPADM

## 2022-05-17 RX ORDER — AMLODIPINE BESYLATE 5 MG/1
10 TABLET ORAL DAILY
Status: DISCONTINUED | OUTPATIENT
Start: 2022-05-17 | End: 2022-05-19 | Stop reason: HOSPADM

## 2022-05-17 RX ORDER — GLUCAGON 1 MG
1 KIT INJECTION
Status: DISCONTINUED | OUTPATIENT
Start: 2022-05-17 | End: 2022-05-19 | Stop reason: HOSPADM

## 2022-05-17 RX ORDER — INSULIN ASPART 100 [IU]/ML
1-10 INJECTION, SOLUTION INTRAVENOUS; SUBCUTANEOUS
Status: DISCONTINUED | OUTPATIENT
Start: 2022-05-17 | End: 2022-05-19 | Stop reason: HOSPADM

## 2022-05-17 RX ORDER — NALOXONE HCL 0.4 MG/ML
0.02 VIAL (ML) INJECTION
Status: DISCONTINUED | OUTPATIENT
Start: 2022-05-17 | End: 2022-05-19 | Stop reason: HOSPADM

## 2022-05-17 RX ORDER — IBUPROFEN 200 MG
16 TABLET ORAL
Status: DISCONTINUED | OUTPATIENT
Start: 2022-05-17 | End: 2022-05-19 | Stop reason: HOSPADM

## 2022-05-17 RX ORDER — PANTOPRAZOLE SODIUM 40 MG/1
40 TABLET, DELAYED RELEASE ORAL DAILY
Status: DISCONTINUED | OUTPATIENT
Start: 2022-05-17 | End: 2022-05-17

## 2022-05-17 RX ORDER — CARVEDILOL 12.5 MG/1
25 TABLET ORAL 2 TIMES DAILY
Status: DISCONTINUED | OUTPATIENT
Start: 2022-05-17 | End: 2022-05-19 | Stop reason: HOSPADM

## 2022-05-17 RX ORDER — HYDROCODONE BITARTRATE AND ACETAMINOPHEN 10; 325 MG/1; MG/1
1 TABLET ORAL DAILY PRN
COMMUNITY
Start: 2022-05-11

## 2022-05-17 RX ADMIN — Medication 10 ML: at 11:05

## 2022-05-17 RX ADMIN — SODIUM CHLORIDE: 0.9 INJECTION, SOLUTION INTRAVENOUS at 03:05

## 2022-05-17 RX ADMIN — CARVEDILOL 25 MG: 12.5 TABLET, FILM COATED ORAL at 08:05

## 2022-05-17 RX ADMIN — HYDROCODONE BITARTRATE AND ACETAMINOPHEN 1 TABLET: 5; 325 TABLET ORAL at 04:05

## 2022-05-17 RX ADMIN — CARVEDILOL 25 MG: 12.5 TABLET, FILM COATED ORAL at 09:05

## 2022-05-17 RX ADMIN — PANTOPRAZOLE SODIUM 40 MG: 40 TABLET, DELAYED RELEASE ORAL at 09:05

## 2022-05-17 RX ADMIN — AMLODIPINE BESYLATE 10 MG: 5 TABLET ORAL at 09:05

## 2022-05-17 RX ADMIN — Medication 10 ML: at 03:05

## 2022-05-17 RX ADMIN — SODIUM CHLORIDE: 0.9 INJECTION, SOLUTION INTRAVENOUS at 05:05

## 2022-05-17 RX ADMIN — Medication 10 ML: at 06:05

## 2022-05-17 RX ADMIN — PANTOPRAZOLE SODIUM 40 MG: 40 TABLET, DELAYED RELEASE ORAL at 08:05

## 2022-05-17 RX ADMIN — HYDROMORPHONE HYDROCHLORIDE 1 MG: 1 INJECTION, SOLUTION INTRAMUSCULAR; INTRAVENOUS; SUBCUTANEOUS at 09:05

## 2022-05-17 RX ADMIN — HYDROMORPHONE HYDROCHLORIDE 1 MG: 1 INJECTION, SOLUTION INTRAMUSCULAR; INTRAVENOUS; SUBCUTANEOUS at 05:05

## 2022-05-17 RX ADMIN — POTASSIUM BICARBONATE 25 MEQ: 978 TABLET, EFFERVESCENT ORAL at 09:05

## 2022-05-17 RX ADMIN — HYDROMORPHONE HYDROCHLORIDE 1 MG: 1 INJECTION, SOLUTION INTRAMUSCULAR; INTRAVENOUS; SUBCUTANEOUS at 07:05

## 2022-05-17 RX ADMIN — SODIUM CHLORIDE: 0.9 INJECTION, SOLUTION INTRAVENOUS at 01:05

## 2022-05-17 RX ADMIN — HYDROCODONE BITARTRATE AND ACETAMINOPHEN 1 TABLET: 5; 325 TABLET ORAL at 11:05

## 2022-05-17 RX ADMIN — ONDANSETRON 4 MG: 2 INJECTION INTRAMUSCULAR; INTRAVENOUS at 07:05

## 2022-05-17 RX ADMIN — ONDANSETRON 4 MG: 2 INJECTION INTRAMUSCULAR; INTRAVENOUS at 01:05

## 2022-05-17 RX ADMIN — ONDANSETRON 4 MG: 2 INJECTION INTRAMUSCULAR; INTRAVENOUS at 02:05

## 2022-05-17 RX ADMIN — FAMOTIDINE 20 MG: 10 INJECTION INTRAVENOUS at 02:05

## 2022-05-17 RX ADMIN — HYDROCODONE BITARTRATE AND ACETAMINOPHEN 1 TABLET: 5; 325 TABLET ORAL at 09:05

## 2022-05-17 RX ADMIN — SODIUM CHLORIDE 1000 ML: 0.9 INJECTION, SOLUTION INTRAVENOUS at 02:05

## 2022-05-17 RX ADMIN — HYDROMORPHONE HYDROCHLORIDE 1 MG: 1 INJECTION, SOLUTION INTRAMUSCULAR; INTRAVENOUS; SUBCUTANEOUS at 01:05

## 2022-05-17 NOTE — ED TRIAGE NOTES
"Pt presents to ED with abd pain. Pt states he has been in and out of hospital for a while with abd pain. "Today the pain had gotten worse, vomiting yellow stuff." Pt has hx of htn and diabetes, noncompliant with meds. This is the extent of the pt complaint at this time.   "

## 2022-05-17 NOTE — PLAN OF CARE
Problem: Adult Inpatient Plan of Care  Goal: Plan of Care Review  Outcome: Ongoing, Progressing     Problem: Diabetes Comorbidity  Goal: Blood Glucose Level Within Targeted Range  Outcome: Ongoing, Progressing     Problem: Fluid and Electrolyte Imbalance (Acute Kidney Injury/Impairment)  Goal: Fluid and Electrolyte Balance  Outcome: Ongoing, Progressing     Problem: Oral Intake Inadequate (Acute Kidney Injury/Impairment)  Goal: Optimal Nutrition Intake  Outcome: Ongoing, Progressing     Problem: Renal Function Impairment (Acute Kidney Injury/Impairment)  Goal: Effective Renal Function  Outcome: Ongoing, Progressing

## 2022-05-17 NOTE — ASSESSMENT & PLAN NOTE
CKD 3  - Baseline Cr ~ 1.4   - Cr up to 2.5. Likely related to N/V + diuretic use  - Improving with IVF. Cr down to 1.9. Continue IVF with strict I/Os  - Hold hctz-losartan

## 2022-05-17 NOTE — CONSULTS
.[facility] - Emergency Dept  Gastroenterology  Consult Note    Patient Name: Emery Barragan  MRN: [id number]  Admission Date: 5/17/2022  Hospital Length of Stay: 0 days  Code Status: Full Code   Primary Care Physician: [facility]  Principal Problem:SHARI (acute kidney injury)    Inpatient consult to Gastroenterology  Consult performed by: Darrian Breen MD  Consult ordered by: Melida Billy MD        Subjective:     Chief complaint: abdominal pain    HPI: Mr. Emery Barragan is a 61 y.o. man with a Hx of HTN, cholecystectomy, tobacco use and DM who presented with nausea and vomiting since yesterday.  Symptoms are episodic and previously last a few days then resolve.  He subsequently developed lower abdominal pain.  No fevers, chills, diarrhea, constipation.  He denies alcohol use. He smokes marijuana daily.    He was hospitalized a month ago with similar symptoms.   He has known celiac artery aneurysm but this was not felt to be contributing to symptoms.  He followed up with vascular surgery for this findings and was recommended given stable size, no intervention necessary.  Repeat CT in 2 years.    Prior endoscopies reviewed  EGD 8/2019 for nausea/vomiting showed la D esophagitis, medium hiatal hernia, gastritis (bx focal IM, neg HP), and normal duodenum    Colonoscopy 4/2016 showed diverticulosis, 3mm SC polyp, 5mm DC polyp, 3mm rectal polyp. Congested mucosa in the cecum.  Repeat recommended in 5 years.     Past medical history:  Past Medical History:   Diagnosis Date    Chronic back pain     Diabetes mellitus     Gallstones     Glaucoma (increased eye pressure)     Hepatitis C     Hypertension        Past surgical history:  Past Surgical History:   Procedure Laterality Date    CHOLECYSTECTOMY      COLONOSCOPY N/A 4/27/2016    Procedure: COLONOSCOPY;  Surgeon: Andre Sanchez MD;  Location: [facility];  Service: Endoscopy;  Laterality: N/A;    ESOPHAGOGASTRODUODENOSCOPY N/A 8/9/2019     Procedure: ESOPHAGOGASTRODUODENOSCOPY (EGD);  Surgeon: Mike Garcia MD;  Location: [facility];  Service: Endoscopy;  Laterality: N/A;       Social history:  Social History     Socioeconomic History    Marital status:    Tobacco Use    Smoking status: Current Every Day Smoker     Packs/day: 1.00     Types: Cigarettes    Smokeless tobacco: Never Used   Substance and Sexual Activity    Alcohol use: No    Drug use: No    Sexual activity: Yes     Partners: Female     Social Determinants of Health     Financial Resource Strain: Low Risk     Difficulty of Paying Living Expenses: Not hard at all   Food Insecurity: Unknown    Worried About Running Out of Food in the Last Year: Never true   Transportation Needs: No Transportation Needs    Lack of Transportation (Medical): No    Lack of Transportation (Non-Medical): No   Physical Activity: Insufficiently Active    Days of Exercise per Week: 7 days    Minutes of Exercise per Session: 20 min   Stress: No Stress Concern Present    Feeling of Stress : Not at all   Social Connections: Socially Integrated    Frequency of Communication with Friends and Family: More than three times a week    Frequency of Social Gatherings with Friends and Family: More than three times a week    Attends Church Services: 1 to 4 times per year    Active Member of Clubs or Organizations: Yes    Attends Club or Organization Meetings: More than 4 times per year    Marital Status:    Housing Stability: Low Risk     Unable to Pay for Housing in the Last Year: No    Number of Places Lived in the Last Year: 1    Unstable Housing in the Last Year: No       Family history:  Family History   Problem Relation Age of Onset    Diabetes Mother     Kidney disease Mother     Heart disease Mother        Medications:    No current facility-administered medications on file prior to encounter.     Current Outpatient Medications on File Prior to Encounter   Medication Sig  Dispense Refill    amlodipine (NORVASC) 10 MG tablet Take 10 mg by mouth once daily.      carvediloL (COREG) 25 MG tablet Take 25 mg by mouth 2 (two) times daily.      insulin glargine (LANTUS) 100 unit/mL injection Inject 40 Units into the skin every morning.       losartan-hydrochlorothiazide 100-25 mg (HYZAAR) 100-25 mg per tablet Take 1 tablet by mouth every morning.      NOVOLOG U-100 INSULIN ASPART 100 unit/mL injection Inject 5-10 Units into the skin 3 (three) times daily with meals.      omeprazole (PRILOSEC) 40 MG capsule SMARTSI Capsule(s) By Mouth Every Evening          Allergies:  Review of patient's allergies indicates:  No Known Allergies    Review of systems:  CONSTITUTIONAL: Negative for fever, chills, weakness, weight loss, weight gain.  HEENT: Negative for blurred vision, hearing loss, nasal congestion, dry mouth, sore throat.  CARDIOVASCULAR: Negative for chest pain or palpitations.  RESPIRATORY: Negative for SOB or cough.  GASTROINTESTINAL: See HPI  GENITOURINARY: Negative for dysuria or hematuria.  MUSCULOSKELETAL: Negative for osteoarthritis or muscle pain.  SKIN: Negative for rashes/lesions.  NEUROLOGIC: Negative for headaches, numbness/tingling.  ENDOCRINE: +diabetes  HEMATOLOGIC: Negative for anemia or blood dyscrasias.  Aside from above positives, complete 10 point review of systems negative.    Objective:     Vital Signs (Most Recent):  Temp: 99.7 °F (37.6 °C) (22)  Pulse: 67 (22)  Resp: 17 (22)  BP: (!) 143/97 (22)  SpO2: 96 % (22) Vital Signs (24h Range):  Temp:  [98.5 °F (36.9 °C)-99.7 °F (37.6 °C)] 99.7 °F (37.6 °C)  Pulse:  [67-87] 67  Resp:  [12-20] 17  SpO2:  [95 %-98 %] 96 %  BP: (118-143)/(82-97) 143/97     Physical examination:  General: well developed, well nourished, no apparent distress  HENT: NCAT, hearing grossly intact, no palpable or visible thyroid mass  Eyes: EOMI, anicteric sclera  LYMH: No cervical,  supraclavicular or axillary lymphadenopathy   Cardiovascular: Regular rate and rhythm. No murmurs appreciated.  Lungs: Non-labored respirations. Breath sounds equal.   Abdomen: soft, mild ttp in lower abdomen, normoactive BS  Extremities: No C/C/E, 2+ dorsalis pedis pulses bilaterally  Neuro: AA&O x 3, no asterixes or tremors  Psych: Appropriate mood and affect.   Skin: No jaundice, rashes or lesions  Musculoskeletal: 5/5 strength bilaterally    Labs:  CBC:   Recent Labs   Lab 05/16/22  2320 05/17/22  0644   WBC 12.81* 9.83   HGB 17.3 16.2   HCT 50.4 46.8    132*     CMP:   Recent Labs   Lab 05/17/22  0644   *   CALCIUM 8.7   ALBUMIN 3.2*   PROT 6.1      K 3.3*   CO2 21*      BUN 31*   CREATININE 1.9*   ALKPHOS 58   ALT 18   AST 21   BILITOT 0.4     Coagulation: No results for input(s): PT, INR, APTT in the last 48 hours.    Imaging:   I have personally reviewed his prior CT as well as AXR which showed non-dilated loops of bowel with air fluid levels      Assessment:   61 y.o. man with    1. Intractable nausea and vomiting: cannabis hyperemesis syndrome vs gastroparesis (2/2 diabetes and narcotics) vs gastritis, PUD, GERD, etc  2. DM2, HTN, tobacco usse    Plan:   - NPO after midnight for EGD in AM  - F/U A1C  - PPI BID  - Antiemetics PRN  - He will probably need an outpatient gastric emptying study  - Cessation of marijuana use  - Outpatient colonoscopy for surveillance for hx of polyps    Thank you for your consult.     Darrian Breen MD  Gastroenterology  [facility] - Emergency Dept

## 2022-05-17 NOTE — ASSESSMENT & PLAN NOTE
- Fairly well controlled. A1c 7.2  - Start moderate dose SSI for now  - Hold home insulin until vomiting improves / able to tolerate PO intake

## 2022-05-17 NOTE — FIRST PROVIDER EVALUATION
" Emergency Department TeleTriage Encounter Note      CHIEF COMPLAINT    Chief Complaint   Patient presents with    Abdominal Pain     "I've been here abut 5 times for the same thing" generalized ABD pain. +N/-V/-D "Im about to start throwing up from the bottom of my stomach. I can feel it coming. It be yellow, yellow, yellow."  Pt denies CP/ SOB. Pt report pain started @ 1800 tonight.       VITAL SIGNS   Initial Vitals [05/16/22 2307]   BP Pulse Resp Temp SpO2   (!) 118/90 87 16 98.5 °F (36.9 °C) 98 %      MAP       --            ALLERGIES    Review of patient's allergies indicates:  No Known Allergies    PROVIDER TRIAGE NOTE  This is a teletriage evaluation of a 61 y.o. male presenting to the ED with c/o abd pain, vomiting.  "derrick been seen here 5 times for this."      ROS: (+) fever, (-) rash   PE:  Appears uncomfortable    Initial orders will be placed and care will be transferred to an alternate provider when patient is roomed for a full evaluation. Any additional orders and the final disposition will be determined by that provider.         ORDERS  Labs Reviewed   CBC W/ AUTO DIFFERENTIAL   COMPREHENSIVE METABOLIC PANEL   LIPASE   TROPONIN I       ED Orders (720h ago, onward)    Start Ordered     Status Ordering Provider    05/16/22 2318 05/16/22 2317  Troponin I  STAT         In process EMILIO MONTOYA    05/16/22 2311 05/16/22 2310  EKG 12-lead  Once         Completed by JEFFREY DOAN on 5/16/2022 at 11:12 PM EMILIO MONTOYA    05/16/22 2310 05/16/22 2310  CBC auto differential  STAT         In process EMILIO MONTOYA    05/16/22 2310 05/16/22 2310  Comprehensive metabolic panel  STAT         In process EMILIO MONTOYA    05/16/22 2310 05/16/22 2310  Lipase  STAT         In process EMILIO MONTOYA            Virtual Visit Note: The provider triage portion of this emergency department evaluation and documentation was performed via [employer], a HIPAA-compliant telemedicine application, in concert with a tele-presenter " in the room. A face to face patient evaluation with one of my colleagues will occur once the patient is placed in an emergency department room.      DISCLAIMER: This note was prepared with [employer] voice recognition transcription software. Garbled syntax, mangled pronouns, and other bizarre constructions may be attributed to that software system.

## 2022-05-17 NOTE — HPI
"62 y/o M with history of DM2, HTN, and tobacco abuse who presented to the ED today complaining of abdominal pain and vomiting. He reports vomiting began yesterday followed by lower abdominal pain, which occurs after he vomits. He reports several episodes of bilious vomiting prompting ED evaluation. He denies associated fever, chills, diarrhea, dysuria. He was hospitalized last month for the same and symptoms resolved with symptomatic treatment. Denies alcohol use but reports daily "heavy" marijuana use.   "

## 2022-05-17 NOTE — ASSESSMENT & PLAN NOTE
Lower abdominal pain  - Pt with recurrent episodes of abdominal pain and vomiting  - Prior work-up unrevealing   - Given recurrent nature, could be THC hyperemesis vs gastroparesis  - GI consulted. EGD tomorrow  - Supportive care with IVF, antiemetics, and PPI  - Clears for now. NPO at midnight

## 2022-05-17 NOTE — SUBJECTIVE & OBJECTIVE
Past Medical History:   Diagnosis Date    Chronic back pain     Diabetes mellitus     Gallstones     Glaucoma (increased eye pressure)     Hepatitis C     Hypertension        Past Surgical History:   Procedure Laterality Date    CHOLECYSTECTOMY      COLONOSCOPY N/A 2016    Procedure: COLONOSCOPY;  Surgeon: Andre Sanchez MD;  Location: [facility];  Service: Endoscopy;  Laterality: N/A;    ESOPHAGOGASTRODUODENOSCOPY N/A 2019    Procedure: ESOPHAGOGASTRODUODENOSCOPY (EGD);  Surgeon: Mike Garcia MD;  Location: [facility];  Service: Endoscopy;  Laterality: N/A;       Review of patient's allergies indicates:  No Known Allergies    No current facility-administered medications on file prior to encounter.     Current Outpatient Medications on File Prior to Encounter   Medication Sig    amlodipine (NORVASC) 10 MG tablet Take 10 mg by mouth once daily.    carvediloL (COREG) 25 MG tablet Take 25 mg by mouth 2 (two) times daily.    HYDROcodone-acetaminophen (NORCO)  mg per tablet Take 1 tablet by mouth daily as needed.    insulin glargine (LANTUS) 100 unit/mL injection Inject 40 Units into the skin every morning.     losartan-hydrochlorothiazide 100-25 mg (HYZAAR) 100-25 mg per tablet Take 1 tablet by mouth every morning.    NOVOLOG U-100 INSULIN ASPART 100 unit/mL injection Inject 5-10 Units into the skin 3 (three) times daily with meals.    omeprazole (PRILOSEC) 40 MG capsule SMARTSI Capsule(s) By Mouth Every Evening     Family History       Problem Relation (Age of Onset)    Diabetes Mother    Heart disease Mother    Kidney disease Mother          Tobacco Use    Smoking status: Current Every Day Smoker     Packs/day: 1.00     Types: Cigarettes    Smokeless tobacco: Never Used   Substance and Sexual Activity    Alcohol use: No    Drug use: No    Sexual activity: Yes     Partners: Female     Review of Systems   Constitutional:  Positive for appetite change. Negative for chills and fever.   HENT:  Negative.     Eyes: Negative.    Respiratory:  Negative for cough and shortness of breath.    Cardiovascular:  Negative for chest pain and palpitations.   Gastrointestinal:  Positive for abdominal pain, nausea and vomiting. Negative for abdominal distention and diarrhea.   Genitourinary: Negative.    Musculoskeletal: Negative.    Neurological: Negative.    Psychiatric/Behavioral: Negative.     All other systems reviewed and are negative.  Objective:     Vital Signs (Most Recent):  Temp: 99.7 °F (37.6 °C) (05/17/22 0924)  Pulse: (!) 59 (05/17/22 1207)  Resp: 17 (05/17/22 1322)  BP: 109/77 (05/17/22 1207)  SpO2: (!) 93 % (05/17/22 1207)   Vital Signs (24h Range):  Temp:  [98.5 °F (36.9 °C)-99.7 °F (37.6 °C)] 99.7 °F (37.6 °C)  Pulse:  [59-87] 59  Resp:  [12-22] 17  SpO2:  [93 %-98 %] 93 %  BP: (109-143)/(77-97) 109/77     Weight: 95.3 kg (210 lb)  Body mass index is 31.01 kg/m².    Physical Exam  Vitals and nursing note reviewed.   Constitutional:       Appearance: He is well-developed.   HENT:      Head: Normocephalic and atraumatic.   Eyes:      General:         Right eye: No discharge.         Left eye: No discharge.      Extraocular Movements: EOM normal.      Conjunctiva/sclera: Conjunctivae normal.   Cardiovascular:      Rate and Rhythm: Normal rate and regular rhythm.      Heart sounds: Normal heart sounds.   Pulmonary:      Effort: Pulmonary effort is normal. No respiratory distress.      Breath sounds: Normal breath sounds.   Abdominal:      General: Bowel sounds are normal. There is no distension.      Palpations: Abdomen is soft.      Tenderness: There is no abdominal tenderness.   Musculoskeletal:         General: Normal range of motion.      Cervical back: Normal range of motion and neck supple.      Right lower leg: No edema.      Left lower leg: No edema.   Skin:     General: Skin is warm and dry.   Neurological:      Mental Status: He is alert and oriented to person, place, and time. Mental status is  at baseline.   Psychiatric:         Behavior: Behavior normal.         CRANIAL NERVES     CN III, IV, VI   Extraocular motions are normal.      Significant Labs: All pertinent labs within the past 24 hours have been reviewed.  CBC:   Recent Labs   Lab 05/16/22 2320 05/17/22  0644   WBC 12.81* 9.83   HGB 17.3 16.2   HCT 50.4 46.8    132*     CMP:   Recent Labs   Lab 05/16/22 2320 05/17/22  0644    139   K 3.5 3.3*    107   CO2 26 21*   GLU 91 127*   BUN 35* 31*   CREATININE 2.5* 1.9*   CALCIUM 10.3 8.7   PROT 7.1 6.1   ALBUMIN 4.0 3.2*   BILITOT 0.4 0.4   ALKPHOS 66 58   AST 21 21   ALT 19 18   ANIONGAP 14 11   EGFRNONAA 26* 37*       Significant Imaging: I have reviewed all pertinent imaging results/findings within the past 24 hours.

## 2022-05-17 NOTE — ED NOTES
NPO:  Discussed NPO status with patient as per MD orders. Reinforced importance maintaining NPO. Pt verbalized understanding. All po liquids and food removed from BS. Will continue to monitor.

## 2022-05-17 NOTE — ED PROVIDER NOTES
"     Source of History:  Patient    Chief complaint:  Abdominal Pain ("I've been here abut 5 times for the same thing" generalized ABD pain. +N/-V/-D "Im about to start throwing up from the bottom of my stomach. I can feel it coming. It be yellow, yellow, yellow."  Pt denies CP/ SOB. Pt report pain started @ 1800 tonight.)      HPI:  Emery Barragan is a 61 y.o. male presenting with history of diabetes and hypertension presents complaining of nausea and vomiting which started this morning.  He reports lower abdominal pain now and states that this typically happens after he vomits.  He denies any fever, chills, myalgias, or urinary symptoms.  He thinks he may have some diarrhea coming but has not had any so for.  He has a remote history of a small-bowel obstruction and status post cholecystectomy.  He denies any previous pancreatitis, gastritis, and peptic ulcer disease.  He denies any alcohol use.    This is the extent to the patients complaints today here in the emergency department.    ROS: As per HPI and below:  Constitutional: No fever.  No chills.  Eyes: No visual changes.  ENT: No sore throat. No ear pain    Cardiovascular: No chest pain.  Respiratory: No shortness of breath.  GI: abdominal pain.  NV  Genitourinary: No abnormal urination.  Neurologic: No headache. No focal weakness.  No numbness.  MSK: no back pain.  Integument: No rashes or lesions.  Hematologic: No easy bruising.  Endocrine: No excessive thirst or urination.    Review of patient's allergies indicates:  No Known Allergies    PMH:  As per HPI and below:  Past Medical History:   Diagnosis Date    Chronic back pain     Diabetes mellitus     Gallstones     Glaucoma (increased eye pressure)     Hepatitis C     Hypertension      Past Surgical History:   Procedure Laterality Date    CHOLECYSTECTOMY      COLONOSCOPY N/A 4/27/2016    Procedure: COLONOSCOPY;  Surgeon: Andre Sanchez MD;  Location: [facility];  Service: Endoscopy;  Laterality: " "N/A;    ESOPHAGOGASTRODUODENOSCOPY N/A 8/9/2019    Procedure: ESOPHAGOGASTRODUODENOSCOPY (EGD);  Surgeon: Mike Garcia MD;  Location: [facility];  Service: Endoscopy;  Laterality: N/A;       Social History     Tobacco Use    Smoking status: Current Every Day Smoker     Packs/day: 1.00     Types: Cigarettes    Smokeless tobacco: Never Used   Substance Use Topics    Alcohol use: No    Drug use: No       Physical Exam:    BP (!) 118/90 (BP Location: Left arm, Patient Position: Sitting)   Pulse 87   Temp 98.5 °F (36.9 °C) (Oral)   Resp 16   Ht 5' 9" (1.753 m)   Wt 95.3 kg (210 lb)   SpO2 98%   BMI 31.01 kg/m²   Nursing note and vital signs reviewed.  Constitutional: No acute distress.  Nontoxic  Eyes: No conjunctival injection.Extraocular muscles are intact.  ENT: Oropharynx clear.  Normal phonation.   Cardiovascular: Regular rate and rhythm.  No murmurs. No gallops. No rubs  Respiratory: Clear to auscultation bilaterally.  Good air movement.  No wheezes.  No rhonchi. No rales. No accessory muscle use..  Abdomen: Soft.  Not distended.  Bilateral lower quadrant tenderness to palpation.  No guarding.  No rebound. Non-peritoneal.  Musculoskeletal: Good range of motion all joints.  No deformities.  Neck supple.  No meningismus.  Skin: No rashes seen.  Good turgor.  No abrasions.  No ecchymoses.  Neurologic: Motor intact.  Sensation intact.  No ataxia. No focal neurological deficits.  Psych: Appropriate, conversant    Labs that have been ordered have been independently reviewed and interpreted by myself.    I decided to obtain the patient's medical records.  Summary of Medical Records:  History of celiac artery aneurysm.  Admitted 4/5-4/6 for abdominal pain with nausea/vomiting.    MDM/ Differential Dx:    61 y.o. male presents afebrile, HD stable and well-appearing with nausea and vomiting.  Labs obtained by the teletriage provider and significant for elevated lipase (86 in 2019 to 295) and BUN/Cr (18/1.4 " to 35/2.5) compared to baseline.  He denies any upper abd pain and TTP on exam.  Lower abd pain appears to be 2/2 effort of vomiting.  Will obtain AXR given h/o SBO and UA and give IVFs and antiemetics.  Will admit to  for hydration and further evaluation of pancreatitis.                   Diagnostic Impression:    1. SHARI (acute kidney injury)    2. Abdominal pain    3. Nausea and vomiting in adult    4. Acute bilateral lower abdominal pain    5. Idiopathic acute pancreatitis, unspecified complication status         ED Disposition Condition    Observation                 Verónica Malave MD  05/17/22 0134

## 2022-05-17 NOTE — PHARMACY MED REC
"Admission Medication History     The home medication history was taken by Mary Anne Mckeon CPHT      Patient was able to verify medication at bedside.      You may go to "Admission" then "Reconcile Home Medications" tabs to review and/or act upon these items.      The home medication list has been updated by the Pharmacy department.    Please read ALL comments highlighted in yellow.    Please address this information as you see fit.     Feel free to contact us if you have any questions or require assistance.      Mary Anne Mckeon CPHT  EXT: [id number]              .          "

## 2022-05-17 NOTE — H&P
"[facility]  History & Physical    Patient Name: Emery Barragan  MRN: [id number]  Patient Class: OP- Observation  Admission Date: 5/17/2022  Attending Physician: Melida Billy MD   Primary Care Provider: [facility]         Patient information was obtained from patient, past medical records and ER records.     Subjective:     Principal Problem:SHARI (acute kidney injury)    Chief Complaint:   Chief Complaint   Patient presents with    Abdominal Pain     "I've been here abut 5 times for the same thing" generalized ABD pain. +N/-V/-D "Im about to start throwing up from the bottom of my stomach. I can feel it coming. It be yellow, yellow, yellow."  Pt denies CP/ SOB. Pt report pain started @ 1800 tonight.        HPI: 60 y/o M with history of DM2, HTN, and tobacco abuse who presented to the ED today complaining of abdominal pain and vomiting. He reports vomiting began yesterday followed by lower abdominal pain, which occurs after he vomits. He reports several episodes of bilious vomiting prompting ED evaluation. He denies associated fever, chills, diarrhea, dysuria. He was hospitalized last month for the same and symptoms resolved with symptomatic treatment. Denies alcohol use but reports daily "heavy" marijuana use.       Past Medical History:   Diagnosis Date    Chronic back pain     Diabetes mellitus     Gallstones     Glaucoma (increased eye pressure)     Hepatitis C     Hypertension        Past Surgical History:   Procedure Laterality Date    CHOLECYSTECTOMY      COLONOSCOPY N/A 4/27/2016    Procedure: COLONOSCOPY;  Surgeon: Andre Sanchez MD;  Location: [facility];  Service: Endoscopy;  Laterality: N/A;    ESOPHAGOGASTRODUODENOSCOPY N/A 8/9/2019    Procedure: ESOPHAGOGASTRODUODENOSCOPY (EGD);  Surgeon: Mike Garcia MD;  Location: [facility];  Service: Endoscopy;  Laterality: N/A;       Review of patient's allergies indicates:  No Known " Allergies    No current facility-administered medications on file prior to encounter.     Current Outpatient Medications on File Prior to Encounter   Medication Sig    amlodipine (NORVASC) 10 MG tablet Take 10 mg by mouth once daily.    carvediloL (COREG) 25 MG tablet Take 25 mg by mouth 2 (two) times daily.    HYDROcodone-acetaminophen (NORCO)  mg per tablet Take 1 tablet by mouth daily as needed.    insulin glargine (LANTUS) 100 unit/mL injection Inject 40 Units into the skin every morning.     losartan-hydrochlorothiazide 100-25 mg (HYZAAR) 100-25 mg per tablet Take 1 tablet by mouth every morning.    NOVOLOG U-100 INSULIN ASPART 100 unit/mL injection Inject 5-10 Units into the skin 3 (three) times daily with meals.    omeprazole (PRILOSEC) 40 MG capsule SMARTSI Capsule(s) By Mouth Every Evening     Family History       Problem Relation (Age of Onset)    Diabetes Mother    Heart disease Mother    Kidney disease Mother          Tobacco Use    Smoking status: Current Every Day Smoker     Packs/day: 1.00     Types: Cigarettes    Smokeless tobacco: Never Used   Substance and Sexual Activity    Alcohol use: No    Drug use: No    Sexual activity: Yes     Partners: Female     Review of Systems   Constitutional:  Positive for appetite change. Negative for chills and fever.   HENT: Negative.     Eyes: Negative.    Respiratory:  Negative for cough and shortness of breath.    Cardiovascular:  Negative for chest pain and palpitations.   Gastrointestinal:  Positive for abdominal pain, nausea and vomiting. Negative for abdominal distention and diarrhea.   Genitourinary: Negative.    Musculoskeletal: Negative.    Neurological: Negative.    Psychiatric/Behavioral: Negative.     All other systems reviewed and are negative.  Objective:     Vital Signs (Most Recent):  Temp: 99.7 °F (37.6 °C) (22 0924)  Pulse: (!) 59 (22 1207)  Resp: 17 (22 1322)  BP: 109/77 (22 1207)  SpO2: (!) 93 %  (05/17/22 1207)   Vital Signs (24h Range):  Temp:  [98.5 °F (36.9 °C)-99.7 °F (37.6 °C)] 99.7 °F (37.6 °C)  Pulse:  [59-87] 59  Resp:  [12-22] 17  SpO2:  [93 %-98 %] 93 %  BP: (109-143)/(77-97) 109/77     Weight: 95.3 kg (210 lb)  Body mass index is 31.01 kg/m².    Physical Exam  Vitals and nursing note reviewed.   Constitutional:       Appearance: He is well-developed.   HENT:      Head: Normocephalic and atraumatic.   Eyes:      General:         Right eye: No discharge.         Left eye: No discharge.      Extraocular Movements: EOM normal.      Conjunctiva/sclera: Conjunctivae normal.   Cardiovascular:      Rate and Rhythm: Normal rate and regular rhythm.      Heart sounds: Normal heart sounds.   Pulmonary:      Effort: Pulmonary effort is normal. No respiratory distress.      Breath sounds: Normal breath sounds.   Abdominal:      General: Bowel sounds are normal. There is no distension.      Palpations: Abdomen is soft.      Tenderness: There is no abdominal tenderness.   Musculoskeletal:         General: Normal range of motion.      Cervical back: Normal range of motion and neck supple.      Right lower leg: No edema.      Left lower leg: No edema.   Skin:     General: Skin is warm and dry.   Neurological:      Mental Status: He is alert and oriented to person, place, and time. Mental status is at baseline.   Psychiatric:         Behavior: Behavior normal.         CRANIAL NERVES     CN III, IV, VI   Extraocular motions are normal.      Significant Labs: All pertinent labs within the past 24 hours have been reviewed.  CBC:   Recent Labs   Lab 05/16/22 2320 05/17/22  0644   WBC 12.81* 9.83   HGB 17.3 16.2   HCT 50.4 46.8    132*     CMP:   Recent Labs   Lab 05/16/22 2320 05/17/22  0644    139   K 3.5 3.3*    107   CO2 26 21*   GLU 91 127*   BUN 35* 31*   CREATININE 2.5* 1.9*   CALCIUM 10.3 8.7   PROT 7.1 6.1   ALBUMIN 4.0 3.2*   BILITOT 0.4 0.4   ALKPHOS 66 58   AST 21 21   ALT 19 18    ANIONGAP 14 11   EGFRNONAA 26* 37*       Significant Imaging: I have reviewed all pertinent imaging results/findings within the past 24 hours.    Assessment/Plan:     * SHARI (acute kidney injury)  CKD 3  - Baseline Cr ~ 1.4   - Cr up to 2.5. Likely related to N/V + diuretic use  - Improving with IVF. Cr down to 1.9. Continue IVF with strict I/Os  - Hold hctz-losartan    Celiac artery aneurysm  - CTA abd/pelvis last month showed aneurysmal dilatation of the celiac artery measuring up to 1.7 cm  - Seen by surgery. Not contributing to symptoms  - Outpatient vascular follow-up    Essential hypertension  - Resume amlodipine and coreg. Hold hctz-losartan    Type 2 diabetes mellitus, with long-term current use of insulin  - Fairly well controlled. A1c 7.2  - Start moderate dose SSI for now  - Hold home insulin until vomiting improves / able to tolerate PO intake    Non-intractable vomiting with nausea  Lower abdominal pain  - Pt with recurrent episodes of abdominal pain and vomiting  - Prior work-up unrevealing   - Given recurrent nature, could be THC hyperemesis vs gastroparesis  - GI consulted. EGD tomorrow  - Supportive care with IVF, antiemetics, and PPI  - Clears for now. NPO at midnight    VTE Risk Mitigation (From admission, onward)         Ordered     IP VTE HIGH RISK PATIENT  Once         05/17/22 0448     Place sequential compression device  Until discontinued         05/17/22 0448                   Melida Billy MD  Department of Hospital Medicine   [facility] Emergency Dept

## 2022-05-17 NOTE — ASSESSMENT & PLAN NOTE
- CTA abd/pelvis last month showed aneurysmal dilatation of the celiac artery measuring up to 1.7 cm  - Seen by surgery. Not contributing to symptoms  - Outpatient vascular follow-up

## 2022-05-18 ENCOUNTER — ANESTHESIA EVENT (OUTPATIENT)
Dept: ENDOSCOPY | Facility: OTHER | Age: 62
End: 2022-05-18
Payer: MEDICARE

## 2022-05-18 ENCOUNTER — ANESTHESIA (OUTPATIENT)
Dept: ENDOSCOPY | Facility: OTHER | Age: 62
End: 2022-05-18
Payer: MEDICARE

## 2022-05-18 LAB
ALBUMIN SERPL BCP-MCNC: 3 G/DL (ref 3.5–5.2)
ALP SERPL-CCNC: 57 U/L (ref 55–135)
ALT SERPL W/O P-5'-P-CCNC: 18 U/L (ref 10–44)
ANION GAP SERPL CALC-SCNC: 10 MMOL/L (ref 8–16)
AST SERPL-CCNC: 20 U/L (ref 10–40)
BASOPHILS # BLD AUTO: 0.02 K/UL (ref 0–0.2)
BASOPHILS NFR BLD: 0.3 % (ref 0–1.9)
BILIRUB SERPL-MCNC: 0.5 MG/DL (ref 0.1–1)
BUN SERPL-MCNC: 23 MG/DL (ref 8–23)
CALCIUM SERPL-MCNC: 8.2 MG/DL (ref 8.7–10.5)
CHLORIDE SERPL-SCNC: 108 MMOL/L (ref 95–110)
CO2 SERPL-SCNC: 23 MMOL/L (ref 23–29)
CREAT SERPL-MCNC: 1.6 MG/DL (ref 0.5–1.4)
DIFFERENTIAL METHOD: ABNORMAL
EOSINOPHIL # BLD AUTO: 0.1 K/UL (ref 0–0.5)
EOSINOPHIL NFR BLD: 1 % (ref 0–8)
ERYTHROCYTE [DISTWIDTH] IN BLOOD BY AUTOMATED COUNT: 12.9 % (ref 11.5–14.5)
EST. GFR  (AFRICAN AMERICAN): 52 ML/MIN/1.73 M^2
EST. GFR  (NON AFRICAN AMERICAN): 45 ML/MIN/1.73 M^2
GLUCOSE SERPL-MCNC: 61 MG/DL (ref 70–110)
HCT VFR BLD AUTO: 44.3 % (ref 40–54)
HGB BLD-MCNC: 14.9 G/DL (ref 14–18)
IMM GRANULOCYTES # BLD AUTO: 0.02 K/UL (ref 0–0.04)
IMM GRANULOCYTES NFR BLD AUTO: 0.3 % (ref 0–0.5)
LYMPHOCYTES # BLD AUTO: 2.2 K/UL (ref 1–4.8)
LYMPHOCYTES NFR BLD: 32 % (ref 18–48)
MAGNESIUM SERPL-MCNC: 1.6 MG/DL (ref 1.6–2.6)
MCH RBC QN AUTO: 29.7 PG (ref 27–31)
MCHC RBC AUTO-ENTMCNC: 33.6 G/DL (ref 32–36)
MCV RBC AUTO: 88 FL (ref 82–98)
MONOCYTES # BLD AUTO: 0.8 K/UL (ref 0.3–1)
MONOCYTES NFR BLD: 12 % (ref 4–15)
NEUTROPHILS # BLD AUTO: 3.8 K/UL (ref 1.8–7.7)
NEUTROPHILS NFR BLD: 54.4 % (ref 38–73)
NRBC BLD-RTO: 0 /100 WBC
PLATELET # BLD AUTO: 122 K/UL (ref 150–450)
PMV BLD AUTO: 11.8 FL (ref 9.2–12.9)
POCT GLUCOSE: 173 MG/DL (ref 70–110)
POCT GLUCOSE: 204 MG/DL (ref 70–110)
POCT GLUCOSE: 224 MG/DL (ref 70–110)
POCT GLUCOSE: 65 MG/DL (ref 70–110)
POTASSIUM SERPL-SCNC: 4 MMOL/L (ref 3.5–5.1)
PROT SERPL-MCNC: 5.8 G/DL (ref 6–8.4)
RBC # BLD AUTO: 5.02 M/UL (ref 4.6–6.2)
SODIUM SERPL-SCNC: 141 MMOL/L (ref 136–145)
WBC # BLD AUTO: 7.01 K/UL (ref 3.9–12.7)

## 2022-05-18 PROCEDURE — 43239 EGD BIOPSY SINGLE/MULTIPLE: CPT | Performed by: INTERNAL MEDICINE

## 2022-05-18 PROCEDURE — 25000003 PHARM REV CODE 250: Performed by: INTERNAL MEDICINE

## 2022-05-18 PROCEDURE — 25000003 PHARM REV CODE 250: Performed by: PHYSICIAN ASSISTANT

## 2022-05-18 PROCEDURE — A4216 STERILE WATER/SALINE, 10 ML: HCPCS | Performed by: PHYSICIAN ASSISTANT

## 2022-05-18 PROCEDURE — G0378 HOSPITAL OBSERVATION PER HR: HCPCS

## 2022-05-18 PROCEDURE — 99225 PR SUBSEQUENT OBSERVATION CARE,LEVEL II: ICD-10-PCS | Mod: ,,, | Performed by: INTERNAL MEDICINE

## 2022-05-18 PROCEDURE — 37000008 HC ANESTHESIA 1ST 15 MINUTES: Performed by: INTERNAL MEDICINE

## 2022-05-18 PROCEDURE — 96361 HYDRATE IV INFUSION ADD-ON: CPT | Mod: 59

## 2022-05-18 PROCEDURE — 63600175 PHARM REV CODE 636 W HCPCS: Performed by: PHYSICIAN ASSISTANT

## 2022-05-18 PROCEDURE — 96372 THER/PROPH/DIAG INJ SC/IM: CPT | Mod: 59 | Performed by: INTERNAL MEDICINE

## 2022-05-18 PROCEDURE — 63600175 PHARM REV CODE 636 W HCPCS: Performed by: INTERNAL MEDICINE

## 2022-05-18 PROCEDURE — 94761 N-INVAS EAR/PLS OXIMETRY MLT: CPT

## 2022-05-18 PROCEDURE — 63600175 PHARM REV CODE 636 W HCPCS: Performed by: NURSE ANESTHETIST, CERTIFIED REGISTERED

## 2022-05-18 PROCEDURE — 88305 TISSUE EXAM BY PATHOLOGIST: CPT | Mod: 26,,, | Performed by: PATHOLOGY

## 2022-05-18 PROCEDURE — 88305 TISSUE EXAM BY PATHOLOGIST: CPT | Performed by: PATHOLOGY

## 2022-05-18 PROCEDURE — 83735 ASSAY OF MAGNESIUM: CPT | Performed by: PHYSICIAN ASSISTANT

## 2022-05-18 PROCEDURE — 96376 TX/PRO/DX INJ SAME DRUG ADON: CPT

## 2022-05-18 PROCEDURE — 80053 COMPREHEN METABOLIC PANEL: CPT | Performed by: PHYSICIAN ASSISTANT

## 2022-05-18 PROCEDURE — 37000009 HC ANESTHESIA EA ADD 15 MINS: Performed by: INTERNAL MEDICINE

## 2022-05-18 PROCEDURE — 88305 TISSUE EXAM BY PATHOLOGIST: ICD-10-PCS | Mod: 26,,, | Performed by: PATHOLOGY

## 2022-05-18 PROCEDURE — 85025 COMPLETE CBC W/AUTO DIFF WBC: CPT | Performed by: PHYSICIAN ASSISTANT

## 2022-05-18 PROCEDURE — 99225 PR SUBSEQUENT OBSERVATION CARE,LEVEL II: CPT | Mod: ,,, | Performed by: INTERNAL MEDICINE

## 2022-05-18 PROCEDURE — 25000003 PHARM REV CODE 250: Performed by: NURSE ANESTHETIST, CERTIFIED REGISTERED

## 2022-05-18 PROCEDURE — 36415 COLL VENOUS BLD VENIPUNCTURE: CPT | Performed by: PHYSICIAN ASSISTANT

## 2022-05-18 RX ORDER — SODIUM CHLORIDE 0.9 % (FLUSH) 0.9 %
3 SYRINGE (ML) INJECTION
Status: DISCONTINUED | OUTPATIENT
Start: 2022-05-18 | End: 2022-05-19 | Stop reason: HOSPADM

## 2022-05-18 RX ORDER — PROCHLORPERAZINE EDISYLATE 5 MG/ML
5 INJECTION INTRAMUSCULAR; INTRAVENOUS EVERY 30 MIN PRN
Status: CANCELLED | OUTPATIENT
Start: 2022-05-18

## 2022-05-18 RX ORDER — HYDROMORPHONE HYDROCHLORIDE 2 MG/ML
0.4 INJECTION, SOLUTION INTRAMUSCULAR; INTRAVENOUS; SUBCUTANEOUS EVERY 5 MIN PRN
Status: CANCELLED | OUTPATIENT
Start: 2022-05-18

## 2022-05-18 RX ORDER — PROPOFOL 10 MG/ML
VIAL (ML) INTRAVENOUS CONTINUOUS PRN
Status: DISCONTINUED | OUTPATIENT
Start: 2022-05-18 | End: 2022-05-18

## 2022-05-18 RX ORDER — PROPOFOL 10 MG/ML
VIAL (ML) INTRAVENOUS
Status: DISCONTINUED | OUTPATIENT
Start: 2022-05-18 | End: 2022-05-18

## 2022-05-18 RX ORDER — MEPERIDINE HYDROCHLORIDE 25 MG/ML
12.5 INJECTION INTRAMUSCULAR; INTRAVENOUS; SUBCUTANEOUS ONCE AS NEEDED
Status: CANCELLED | OUTPATIENT
Start: 2022-05-18 | End: 2022-05-19

## 2022-05-18 RX ORDER — DIPHENHYDRAMINE HYDROCHLORIDE 50 MG/ML
12.5 INJECTION INTRAMUSCULAR; INTRAVENOUS EVERY 30 MIN PRN
Status: CANCELLED | OUTPATIENT
Start: 2022-05-18

## 2022-05-18 RX ORDER — OXYCODONE HYDROCHLORIDE 5 MG/1
5 TABLET ORAL
Status: CANCELLED | OUTPATIENT
Start: 2022-05-18

## 2022-05-18 RX ORDER — PANTOPRAZOLE SODIUM 40 MG/1
40 TABLET, DELAYED RELEASE ORAL DAILY
Status: DISCONTINUED | OUTPATIENT
Start: 2022-05-19 | End: 2022-05-19 | Stop reason: HOSPADM

## 2022-05-18 RX ORDER — LIDOCAINE HCL/PF 100 MG/5ML
SYRINGE (ML) INTRAVENOUS
Status: DISCONTINUED | OUTPATIENT
Start: 2022-05-18 | End: 2022-05-18

## 2022-05-18 RX ORDER — DEXAMETHASONE SODIUM PHOSPHATE 4 MG/ML
INJECTION, SOLUTION INTRA-ARTICULAR; INTRALESIONAL; INTRAMUSCULAR; INTRAVENOUS; SOFT TISSUE
Status: DISCONTINUED | OUTPATIENT
Start: 2022-05-18 | End: 2022-05-18

## 2022-05-18 RX ADMIN — INSULIN ASPART 2 UNITS: 100 INJECTION, SOLUTION INTRAVENOUS; SUBCUTANEOUS at 01:05

## 2022-05-18 RX ADMIN — CARVEDILOL 25 MG: 12.5 TABLET, FILM COATED ORAL at 08:05

## 2022-05-18 RX ADMIN — Medication 10 ML: at 09:05

## 2022-05-18 RX ADMIN — HYDROMORPHONE HYDROCHLORIDE 1 MG: 1 INJECTION, SOLUTION INTRAMUSCULAR; INTRAVENOUS; SUBCUTANEOUS at 01:05

## 2022-05-18 RX ADMIN — HYDROMORPHONE HYDROCHLORIDE 1 MG: 1 INJECTION, SOLUTION INTRAMUSCULAR; INTRAVENOUS; SUBCUTANEOUS at 08:05

## 2022-05-18 RX ADMIN — SODIUM CHLORIDE: 0.9 INJECTION, SOLUTION INTRAVENOUS at 01:05

## 2022-05-18 RX ADMIN — ONDANSETRON 4 MG: 2 INJECTION INTRAMUSCULAR; INTRAVENOUS at 06:05

## 2022-05-18 RX ADMIN — HYDROCODONE BITARTRATE AND ACETAMINOPHEN 1 TABLET: 5; 325 TABLET ORAL at 05:05

## 2022-05-18 RX ADMIN — PROPOFOL 150 MCG/KG/MIN: 10 INJECTION, EMULSION INTRAVENOUS at 07:05

## 2022-05-18 RX ADMIN — INSULIN ASPART 4 UNITS: 100 INJECTION, SOLUTION INTRAVENOUS; SUBCUTANEOUS at 05:05

## 2022-05-18 RX ADMIN — Medication 10 ML: at 02:05

## 2022-05-18 RX ADMIN — LIDOCAINE HYDROCHLORIDE 100 MG: 20 INJECTION, SOLUTION INTRAVENOUS at 07:05

## 2022-05-18 RX ADMIN — HYDROCODONE BITARTRATE AND ACETAMINOPHEN 1 TABLET: 5; 325 TABLET ORAL at 11:05

## 2022-05-18 RX ADMIN — GLYCOPYRROLATE 0.2 MG: 0.2 INJECTION, SOLUTION INTRAMUSCULAR; INTRAVITREAL at 06:05

## 2022-05-18 RX ADMIN — PROPOFOL 100 MG: 10 INJECTION, EMULSION INTRAVENOUS at 07:05

## 2022-05-18 RX ADMIN — ONDANSETRON 4 MG: 2 INJECTION INTRAMUSCULAR; INTRAVENOUS at 04:05

## 2022-05-18 RX ADMIN — HYDROMORPHONE HYDROCHLORIDE 1 MG: 1 INJECTION, SOLUTION INTRAMUSCULAR; INTRAVENOUS; SUBCUTANEOUS at 07:05

## 2022-05-18 RX ADMIN — ONDANSETRON 4 MG: 2 INJECTION INTRAMUSCULAR; INTRAVENOUS at 10:05

## 2022-05-18 RX ADMIN — AMLODIPINE BESYLATE 10 MG: 5 TABLET ORAL at 08:05

## 2022-05-18 RX ADMIN — DEXAMETHASONE SODIUM PHOSPHATE 4 MG: 4 INJECTION, SOLUTION INTRAMUSCULAR; INTRAVENOUS at 06:05

## 2022-05-18 RX ADMIN — INSULIN ASPART 2 UNITS: 100 INJECTION, SOLUTION INTRAVENOUS; SUBCUTANEOUS at 09:05

## 2022-05-18 RX ADMIN — Medication 10 ML: at 05:05

## 2022-05-18 RX ADMIN — HYDROCODONE BITARTRATE AND ACETAMINOPHEN 1 TABLET: 5; 325 TABLET ORAL at 10:05

## 2022-05-18 RX ADMIN — SODIUM CHLORIDE, SODIUM LACTATE, POTASSIUM CHLORIDE, AND CALCIUM CHLORIDE: .6; .31; .03; .02 INJECTION, SOLUTION INTRAVENOUS at 06:05

## 2022-05-18 RX ADMIN — SODIUM CHLORIDE: 0.9 INJECTION, SOLUTION INTRAVENOUS at 08:05

## 2022-05-18 RX ADMIN — PANTOPRAZOLE SODIUM 40 MG: 40 TABLET, DELAYED RELEASE ORAL at 08:05

## 2022-05-18 NOTE — SUBJECTIVE & OBJECTIVE
Interval History: No acute events overnight. Pt had EGD this morning. Attempted to eat regular diet but because nauseous without emesis.    Review of Systems   Constitutional:  Negative for chills and fever.   Respiratory:  Negative for shortness of breath.    Cardiovascular:  Negative for chest pain.   Gastrointestinal:  Positive for abdominal pain and nausea. Negative for vomiting.   Objective:     Vital Signs (Most Recent):  Temp: 98 °F (36.7 °C) (05/18/22 1125)  Pulse: 62 (05/18/22 1125)  Resp: 18 (05/18/22 1125)  BP: 133/86 (05/18/22 1125)  SpO2: 95 % (05/18/22 1125) Vital Signs (24h Range):  Temp:  [98 °F (36.7 °C)-98.9 °F (37.2 °C)] 98 °F (36.7 °C)  Pulse:  [53-74] 62  Resp:  [17-20] 18  SpO2:  [91 %-98 %] 95 %  BP: (124-158)/() 133/86     Weight: 82.7 kg (182 lb 5.1 oz)  Body mass index is 26.92 kg/m².    Intake/Output Summary (Last 24 hours) at 5/18/2022 1244  Last data filed at 5/18/2022 0842  Gross per 24 hour   Intake 3281.81 ml   Output 875 ml   Net 2406.81 ml      Physical Exam  Vitals and nursing note reviewed.   Constitutional:       General: He is not in acute distress.     Appearance: Normal appearance. He is well-developed.   Cardiovascular:      Rate and Rhythm: Normal rate and regular rhythm.      Heart sounds: Normal heart sounds.   Pulmonary:      Effort: Pulmonary effort is normal. No respiratory distress.      Breath sounds: Normal breath sounds.   Abdominal:      General: Bowel sounds are normal.      Palpations: Abdomen is soft.      Tenderness: There is no abdominal tenderness.   Musculoskeletal:      Right lower leg: No edema.      Left lower leg: No edema.   Skin:     General: Skin is warm and dry.   Neurological:      Mental Status: He is alert and oriented to person, place, and time. Mental status is at baseline.   Psychiatric:         Behavior: Behavior normal.       Significant Labs: All pertinent labs within the past 24 hours have been reviewed.  BMP:   Recent Labs   Lab  05/18/22  0609   GLU 61*      K 4.0      CO2 23   BUN 23   CREATININE 1.6*   CALCIUM 8.2*   MG 1.6     CBC:   Recent Labs   Lab 05/16/22  2320 05/17/22  0644 05/18/22  0609   WBC 12.81* 9.83 7.01   HGB 17.3 16.2 14.9   HCT 50.4 46.8 44.3    132* 122*       Significant Imaging: I have reviewed all pertinent imaging results/findings within the past 24 hours.

## 2022-05-18 NOTE — NURSING
Oriented x 4. Vitals stable. PRN medication administered overnight to manage pain and nausea. IV intact. Normal saline infusing at 125 ml/hr. CHG bath complete. Pre-op checklist complete. Procedure consents not present in chart.

## 2022-05-18 NOTE — ANESTHESIA POSTPROCEDURE EVALUATION
Anesthesia Post Evaluation    Patient: Emery Barragan    Procedure(s) Performed: Procedure(s) (LRB):  EGD (ESOPHAGOGASTRODUODENOSCOPY) (N/A)    Final Anesthesia Type: general      Patient location during evaluation: PACU  Patient participation: Yes- Able to Participate  Level of consciousness: awake and alert  Post-procedure vital signs: reviewed and stable  Pain management: adequate  Airway patency: patent    PONV status at discharge: No PONV  Anesthetic complications: no      Cardiovascular status: blood pressure returned to baseline  Respiratory status: unassisted and room air  Hydration status: euvolemic  Follow-up not needed.          Vitals Value Taken Time   /89 05/18/22 0814   Temp 36.8 °C (98.2 °F) 05/18/22 0814   Pulse 66 05/18/22 0814   Resp 18 05/18/22 0855   SpO2 93 % 05/18/22 0915         Event Time   Out of Recovery 05/18/2022 07:51:26         Pain/Gloria Score: Pain Rating Prior to Med Admin: 8 (5/18/2022  8:55 AM)  Pain Rating Post Med Admin: 6 (5/18/2022 12:01 AM)  Gloria Score: 9 (5/18/2022  7:20 AM)

## 2022-05-18 NOTE — ANESTHESIA PREPROCEDURE EVALUATION
05/18/2022  Emery Barragan is a 61 y.o., male.    Pre-op Assessment    I have reviewed the Patient Summary Reports.    I have reviewed the Nursing Notes. I have reviewed the NPO Status.   I have reviewed the Medications.     Review of Systems  Anesthesia Hx:  No problems with previous Anesthesia  Denies Family Hx of Anesthesia complications.   Denies Personal Hx of Anesthesia complications.   Social:  Smoker 1 ppd   Hematology/Oncology:  Hematology Normal   Oncology Normal     EENT/Dental:  EENT/Dental Normal Eyes: Eye Disease: Glaucoma:      Cardiovascular:   Exercise tolerance: poor Hypertension, poorly controlled Celiac artery aneurysm   Pulmonary:  Pulmonary Normal    Renal/:   Chronic Renal Disease, CRI    Hepatic/GI:   PUD, GERD Hepatitis, C    Musculoskeletal:  Spine Disorders: lumbar    Neurological:  Neurology Normal    Endocrine:   Diabetes, poorly controlled, type 2, using insulin    Dermatological:  Skin Normal    Psych:  Psychiatric Normal           Physical Exam  General:  Well nourished      Airway/Jaw/Neck:  Airway Findings: Mouth Opening: Normal   Tongue: Normal   General Airway Assessment: Adult Mallampati: I  TM Distance: Normal, at least 6 cm   Jaw/Neck Findings:  Neck ROM: Normal ROM       Dental:  Dental Findings: In tact               Anesthesia Plan  Type of Anesthesia, risks & benefits discussed:  Anesthesia Type:  general    Patient's Preference:   Plan Factors:          Intra-op Monitoring Plan:   Intra-op Monitoring Plan Comments:   Post Op Pain Control Plan:   Post Op Pain Control Plan Comments:     Induction:   IV  Beta Blocker:         Informed Consent: Informed consent signed with the Patient and all parties understand the risks and agree with anesthesia plan.  All questions answered.  Anesthesia consent signed with patient.  ASA Score: 3     Day of Surgery Review of History &  Physical:              Ready For Surgery From Anesthesia Perspective.           Physical Exam  General: Well nourished    Airway:  Mallampati: I   Mouth Opening: Normal  TM Distance: Normal, at least 6 cm  Tongue: Normal  Neck ROM: Normal ROM    Dental:  In tact          Anesthesia Plan  Type of Anesthesia, risks & benefits discussed:    Anesthesia Type: general  Induction:  IV  Informed Consent: Informed consent signed with the Patient and all parties understand the risks and agree with anesthesia plan.  All questions answered.   ASA Score: 3    Ready For Surgery From Anesthesia Perspective.       .

## 2022-05-18 NOTE — [employer] PATIENT INSTRUCTIONS
Discharge Summary/Instructions after an Endoscopic Procedure  Patient Name: Emery Barragan  Patient MRN: [id number]  Patient YOB: 1960  Wednesday, May 18, 2022  Darrian Breen MD  RESTRICTIONS:  During your procedure today, you received medications for sedation.  These   medications may affect your judgment, balance and coordination.  Therefore,   for 24 hours, you have the following restrictions:   - DO NOT drive a car, operate machinery, make legal/financial decisions,   sign important papers or drink alcohol.    ACTIVITY:  Today: no heavy lifting, straining or running due to procedural   sedation/anesthesia.  The following day: return to full activity including work.  DIET:  Eat and drink normally unless instructed otherwise.     TREATMENT FOR COMMON SIDE EFFECTS:  - Mild abdominal pain, nausea, belching, bloating or excessive gas:  rest,   eat lightly and use a heating pad.  - Sore Throat: treat with throat lozenges and/or gargle with warm salt   water.  - Because air was used during the procedure, expelling large amounts of air   from your rectum or belching is normal.  - If a bowel prep was taken, you may not have a bowel movement for 1-3 days.    This is normal.  SYMPTOMS TO WATCH FOR AND REPORT TO YOUR PHYSICIAN:  1. Abdominal pain or bloating, other than gas cramps.  2. Chest pain.  3. Back pain.  4. Signs of infection such as: chills or fever occurring within 24 hours   after the procedure.  5. Rectal bleeding, which would show as bright red, maroon, or black stools.   (A tablespoon of blood from the rectum is not serious, especially if   hemorrhoids are present.)  6. Vomiting.  7. Weakness or dizziness.  GO DIRECTLY TO THE NEAREST EMERGENCY ROOM IF YOU HAVE ANY OF THE FOLLOWING:      Difficulty breathing              Chills and/or fever over 101 F   Persistent vomiting and/or vomiting blood   Severe abdominal pain   Severe chest pain   Black, tarry stools   Bleeding- more than one  tablespoon   Any other symptom or condition that you feel may need urgent attention  Your doctor recommends these additional instructions:  If any biopsies were taken, your doctors clinic will contact you in 1 to 2   weeks with any results.  - Return patient to hospital [facility] for ongoing care.   - Advance diet as tolerated.   - Continue present medications.   - Await pathology results.   - Refer to an ENT specialist as an outpatient.  For questions, problems or results please call your physician - Darrian Breen MD at Work:  [phone].  [facility] [place] [facility], EMERGENCY ROOM PHONE NUMBER: [phone], [facility]   [phone].  IF A COMPLICATION OR EMERGENCY SITUATION ARISES AND YOU ARE UNABLE TO REACH   YOUR PHYSICIAN - GO DIRECTLY TO THE EMERGENCY ROOM.  Darrian Breen MD  5/18/2022 7:15:01 AM  This report has been verified and signed electronically.  Dear patient,  As a result of recent federal legislation (The Federal Cures Act), you may   receive lab or pathology results from your procedure in your [facility]   account before your physician is able to contact you. Your physician or   their representative will relay the results to you with their   recommendations at their soonest availability.  Thank you,  [employer]

## 2022-05-18 NOTE — BRIEF OP NOTE
S/p EGD 5/18/22    - Submucosal nodule in left piriform sinus  - There was mild gastritis and a small hiatal hernia. Otherwise normal EGD to 2nd portion of duodenum.  Gastric biopsies obtained.    - Outpatient ENT referral  - Advance diet as tolerated  - Antiemetics PRN  - PPI daily  - Follow up path results  - Follow up in clinic in 2-3 weeks.  - Outpatient GES  - Cessation of marijuana use recommended  - Outpatient colonoscopy for surveillance of polyps  - OK for discharge from GI perspective if he is able to tolerate PO    Darrian Breen MD

## 2022-05-18 NOTE — PLAN OF CARE
Initial Discharge Planning Assessment:  Patient admitted on: 5/17/22     Chart reviewed, Care plan discussed with treatment team,  attending Dr. Billy     PCP updated in Epic: Dr. Billy  Pharmacy, updated in Epic: Bedside      DME at home: None       Current dispo: Home       Transportation: Family can provide transportation home      Power of  or Living Will: Family      Anticipated DC needs from  perspective:       05/18/22 1510   Discharge Assessment   Assessment Type Discharge Planning Assessment   Confirmed/corrected address, phone number and insurance Yes   Confirmed Demographics Correct on Facesheet   Source of Information patient;health record   Lives With spouse   Do you expect to return to your current living situation? Yes   Do you have help at home or someone to help you manage your care at home? Yes   Who are your caregiver(s) and their phone number(s)? Family/friends   Prior to hospitilization cognitive status: Alert/Oriented   Current cognitive status: Alert/Oriented   Walking or Climbing Stairs Difficulty none   Dressing/Bathing Difficulty none   Equipment Currently Used at Home none   Readmission within 30 days? Yes   Patient currently being followed by outpatient case management? No   Do you currently have service(s) that help you manage your care at home? No   Do you take prescription medications? Yes   Do you have prescription coverage? Yes   Do you have any problems affording any of your prescribed medications? No   How do you get to doctors appointments? car, drives self;family or friend will provide   Are you on dialysis? No   Do you take coumadin? No   Discharge Plan A Home with family   Discharge Plan B Home Health   DME Needed Upon Discharge  none   Discharge Plan discussed with: Patient   Discharge Barriers Identified None

## 2022-05-18 NOTE — PLAN OF CARE
05/18/22 1518   Physical Activity   On average, how many days per week do you engage in moderate to strenuous exercise (like a brisk walk)? 0 days   On average, how many minutes do you engage in exercise at this level? 0 min   Financial Resource Strain   How hard is it for you to pay for the very basics like food, housing, medical care, and heating? Not hard   Housing Stability   In the last 12 months, was there a time when you were not able to pay the mortgage or rent on time? N   In the last 12 months, was there a time when you did not have a steady place to sleep or slept in a shelter (including now)? N   Transportation Needs   In the past 12 months, has lack of transportation kept you from medical appointments or from getting medications? no   In the past 12 months, has lack of transportation kept you from meetings, work, or from getting things needed for daily living? No   Food Insecurity   Within the past 12 months, you worried that your food would run out before you got the money to buy more. Never true   Within the past 12 months, the food you bought just didn't last and you didn't have money to get more. Never true   Stress   Do you feel stress - tense, restless, nervous, or anxious, or unable to sleep at night because your mind is troubled all the time - these days? Only a littl   Social Connections   In a typical week, how many times do you talk on the phone with family, friends, or neighbors? Three   How often do you get together with friends or relatives? Once   How often do you attend Faith or Confucianist services? Never   Do you belong to any clubs or organizations such as Faith groups, unions, fraternal or athletic groups, or school groups? No   How often do you attend meetings of the clubs or organizations you belong to? Never   Are you , , , , never , or living with a partner? Never marrie   Alcohol Use   Q1: How often do you have a drink containing alcohol?  Monthly or l   Q2: How many drinks containing alcohol do you have on a typical day when you are drinking? 1 or 2   Q3: How often do you have six or more drinks on one occasion? Never

## 2022-05-18 NOTE — PLAN OF CARE
05/18/22 1518   Physical Activity   On average, how many days per week do you engage in moderate to strenuous exercise (like a brisk walk)? 0 days   On average, how many minutes do you engage in exercise at this level? 0 min   Financial Resource Strain   How hard is it for you to pay for the very basics like food, housing, medical care, and heating? Not hard   Housing Stability   In the last 12 months, was there a time when you were not able to pay the mortgage or rent on time? N   In the last 12 months, was there a time when you did not have a steady place to sleep or slept in a shelter (including now)? N   Transportation Needs   In the past 12 months, has lack of transportation kept you from medical appointments or from getting medications? no   In the past 12 months, has lack of transportation kept you from meetings, work, or from getting things needed for daily living? No   Food Insecurity   Within the past 12 months, you worried that your food would run out before you got the money to buy more. Never true   Within the past 12 months, the food you bought just didn't last and you didn't have money to get more. Never true   Stress   Do you feel stress - tense, restless, nervous, or anxious, or unable to sleep at night because your mind is troubled all the time - these days? Only a littl   Social Connections   In a typical week, how many times do you talk on the phone with family, friends, or neighbors? Three   How often do you get together with friends or relatives? Once   How often do you attend Congregation or Faith services? Never   Do you belong to any clubs or organizations such as Congregation groups, unions, fraternal or athletic groups, or school groups? No   How often do you attend meetings of the clubs or organizations you belong to? Never   Are you , , , , never , or living with a partner? Never marrie   Alcohol Use   Q1: How often do you have a drink containing alcohol?  Monthly or l   Q2: How many drinks containing alcohol do you have on a typical day when you are drinking? 1 or 2   Q3: How often do you have six or more drinks on one occasion? Never

## 2022-05-18 NOTE — ASSESSMENT & PLAN NOTE
CKD 3  - Baseline Cr ~ 1.4   - Cr up to 2.5. Likely related to N/V + diuretic use  - Improving with IVF. Cr down to 1.6. Continue IVF with strict I/Os  - Hold hctz-losartan

## 2022-05-18 NOTE — ASSESSMENT & PLAN NOTE
- Fairly well controlled. A1c 7.2  - Continue moderate dose SSI for now  - Hold home insulin until vomiting improves / able to tolerate PO intake

## 2022-05-18 NOTE — HOSPITAL COURSE
Patient admitted with SHARI due to vomiting. He was admitted here 1 month prior with similar symptoms and work-up was unremarkable. He was started on IVF and symptomatic treatment with antiemetics. Given recurrent symptoms, GI consulted. He had EGD on 5/18 that showed mild gastritis and small hiatal hernia but otherwise unremarkable. Renal function improved with IVF. Diet slowly advanced and tolerated. He is feeling improved and stable for discharge home today. Counseled on cessation of marijuana. He will follow-up with PCP outpatient for gastric emptying study.

## 2022-05-18 NOTE — PROGRESS NOTES
"[facility]  Progress Note    Patient Name: Emery Barrgaan  MRN: [id number]  Patient Class: OP- Observation   Admission Date: 5/17/2022  Length of Stay: 0 days  Attending Physician: Melida Billy MD  Primary Care Provider: [facility]        Subjective:     Principal Problem:SHARI (acute kidney injury)        HPI:  60 y/o M with history of DM2, HTN, and tobacco abuse who presented to the ED today complaining of abdominal pain and vomiting. He reports vomiting began yesterday followed by lower abdominal pain, which occurs after he vomits. He reports several episodes of bilious vomiting prompting ED evaluation. He denies associated fever, chills, diarrhea, dysuria. He was hospitalized last month for the same and symptoms resolved with symptomatic treatment. Denies alcohol use but reports daily "heavy" marijuana use.       Overview/Hospital Course:  Patient admitted with SHARI due to vomiting. He was admitted here 1 month prior with similar symptoms and work-up was unremarkable. He was started on IVF and symptomatic treatment with antiemetics. Given recurrent symptoms, GI consulted. He had EGD on 5/18 that showed mild gastritis and small hiatal hernia but otherwise unremarkable. Renal function improved with IVF.      Interval History: No acute events overnight. Pt had EGD this morning. Attempted to eat regular diet but because nauseous without emesis.    Review of Systems   Constitutional:  Negative for chills and fever.   Respiratory:  Negative for shortness of breath.    Cardiovascular:  Negative for chest pain.   Gastrointestinal:  Positive for abdominal pain and nausea. Negative for vomiting.   Objective:     Vital Signs (Most Recent):  Temp: 98 °F (36.7 °C) (05/18/22 1125)  Pulse: 62 (05/18/22 1125)  Resp: 18 (05/18/22 1125)  BP: 133/86 (05/18/22 1125)  SpO2: 95 % (05/18/22 1125) Vital Signs (24h Range):  Temp:  [98 °F (36.7 °C)-98.9 °F (37.2 °C)] 98 °F (36.7 " °C)  Pulse:  [53-74] 62  Resp:  [17-20] 18  SpO2:  [91 %-98 %] 95 %  BP: (124-158)/() 133/86     Weight: 82.7 kg (182 lb 5.1 oz)  Body mass index is 26.92 kg/m².    Intake/Output Summary (Last 24 hours) at 5/18/2022 1244  Last data filed at 5/18/2022 0842  Gross per 24 hour   Intake 3281.81 ml   Output 875 ml   Net 2406.81 ml      Physical Exam  Vitals and nursing note reviewed.   Constitutional:       General: He is not in acute distress.     Appearance: Normal appearance. He is well-developed.   Cardiovascular:      Rate and Rhythm: Normal rate and regular rhythm.      Heart sounds: Normal heart sounds.   Pulmonary:      Effort: Pulmonary effort is normal. No respiratory distress.      Breath sounds: Normal breath sounds.   Abdominal:      General: Bowel sounds are normal.      Palpations: Abdomen is soft.      Tenderness: There is no abdominal tenderness.   Musculoskeletal:      Right lower leg: No edema.      Left lower leg: No edema.   Skin:     General: Skin is warm and dry.   Neurological:      Mental Status: He is alert and oriented to person, place, and time. Mental status is at baseline.   Psychiatric:         Behavior: Behavior normal.       Significant Labs: All pertinent labs within the past 24 hours have been reviewed.  BMP:   Recent Labs   Lab 05/18/22  0609   GLU 61*      K 4.0      CO2 23   BUN 23   CREATININE 1.6*   CALCIUM 8.2*   MG 1.6     CBC:   Recent Labs   Lab 05/16/22  2320 05/17/22  0644 05/18/22  0609   WBC 12.81* 9.83 7.01   HGB 17.3 16.2 14.9   HCT 50.4 46.8 44.3    132* 122*       Significant Imaging: I have reviewed all pertinent imaging results/findings within the past 24 hours.      Assessment/Plan:      * SHARI (acute kidney injury)  CKD 3  - Baseline Cr ~ 1.4   - Cr up to 2.5. Likely related to N/V + diuretic use  - Improving with IVF. Cr down to 1.6. Continue IVF with strict I/Os  - Hold hctz-losartan    Celiac artery aneurysm  - CTA abd/pelvis last month  showed aneurysmal dilatation of the celiac artery measuring up to 1.7 cm  - Seen by surgery. Not contributing to symptoms  - Outpatient vascular follow-up    Essential hypertension  - Continue amlodipine and coreg. Hold hctz-losartan    Type 2 diabetes mellitus, with long-term current use of insulin  - Fairly well controlled. A1c 7.2  - Continue moderate dose SSI for now  - Hold home insulin until vomiting improves / able to tolerate PO intake    Non-intractable vomiting with nausea  Lower abdominal pain  - Pt with recurrent episodes of abdominal pain and vomiting  - Prior work-up unrevealing   - Given recurrent nature, could be THC hyperemesis vs gastroparesis  - GI consulted  - EGD (5/18) with submucosal nodule in L piriform sinus, mild gastritis, small hiatal hernia. Otherwise normal  - Continue supportive care with IVF, antiemetics, and PPI  - Did not tolerate regular diet this morning. Downgrade to full liquids. Advance as able  - Will need outpatient gastric emptying study    VTE Risk Mitigation (From admission, onward)         Ordered     IP VTE HIGH RISK PATIENT  Once         05/17/22 0448     Place sequential compression device  Until discontinued         05/17/22 0448                    Melida Billy MD  Department of Hospital Medicine   [facility] ([place])

## 2022-05-18 NOTE — TRANSFER OF CARE
"Anesthesia Transfer of Care Note    Patient: Emery Barragan    Procedure(s) Performed: Procedure(s) (LRB):  EGD (ESOPHAGOGASTRODUODENOSCOPY) (N/A)    Patient location: PACU    Anesthesia Type: general    Transport from OR: Transported from OR on room air with adequate spontaneous ventilation    Post pain: adequate analgesia    Post assessment: no apparent anesthetic complications    Post vital signs: stable    Level of consciousness: awake and alert    Nausea/Vomiting: no nausea/vomiting    Complications: none    Transfer of care protocol was followed      Last vitals:   Visit Vitals  /89 (BP Location: Left arm, Patient Position: Lying)   Pulse 64   Temp 37.2 °C (98.9 °F) (Oral)   Resp 18   Ht 5' 9" (1.753 m)   Wt 82.7 kg (182 lb 5.1 oz)   SpO2 (!) 92%   BMI 26.92 kg/m²     "

## 2022-05-19 VITALS
HEART RATE: 60 BPM | BODY MASS INDEX: 27 KG/M2 | DIASTOLIC BLOOD PRESSURE: 84 MMHG | WEIGHT: 182.31 LBS | HEIGHT: 69 IN | SYSTOLIC BLOOD PRESSURE: 128 MMHG | RESPIRATION RATE: 18 BRPM | OXYGEN SATURATION: 95 % | TEMPERATURE: 98 F

## 2022-05-19 PROBLEM — R10.30 LOWER ABDOMINAL PAIN: Status: RESOLVED | Noted: 2022-05-17 | Resolved: 2022-05-19

## 2022-05-19 PROBLEM — N17.9 AKI (ACUTE KIDNEY INJURY): Status: RESOLVED | Noted: 2022-05-17 | Resolved: 2022-05-19

## 2022-05-19 LAB
ALBUMIN SERPL BCP-MCNC: 3 G/DL (ref 3.5–5.2)
ALP SERPL-CCNC: 54 U/L (ref 55–135)
ALT SERPL W/O P-5'-P-CCNC: 20 U/L (ref 10–44)
ANION GAP SERPL CALC-SCNC: 11 MMOL/L (ref 8–16)
AST SERPL-CCNC: 19 U/L (ref 10–40)
BASOPHILS # BLD AUTO: 0.01 K/UL (ref 0–0.2)
BASOPHILS NFR BLD: 0.1 % (ref 0–1.9)
BILIRUB SERPL-MCNC: 0.4 MG/DL (ref 0.1–1)
BUN SERPL-MCNC: 18 MG/DL (ref 8–23)
CALCIUM SERPL-MCNC: 8.4 MG/DL (ref 8.7–10.5)
CHLORIDE SERPL-SCNC: 109 MMOL/L (ref 95–110)
CO2 SERPL-SCNC: 22 MMOL/L (ref 23–29)
CREAT SERPL-MCNC: 1.5 MG/DL (ref 0.5–1.4)
DIFFERENTIAL METHOD: ABNORMAL
EOSINOPHIL # BLD AUTO: 0 K/UL (ref 0–0.5)
EOSINOPHIL NFR BLD: 0.3 % (ref 0–8)
ERYTHROCYTE [DISTWIDTH] IN BLOOD BY AUTOMATED COUNT: 12.6 % (ref 11.5–14.5)
EST. GFR  (AFRICAN AMERICAN): 56 ML/MIN/1.73 M^2
EST. GFR  (NON AFRICAN AMERICAN): 49 ML/MIN/1.73 M^2
GLUCOSE SERPL-MCNC: 177 MG/DL (ref 70–110)
HCT VFR BLD AUTO: 42 % (ref 40–54)
HGB BLD-MCNC: 14.7 G/DL (ref 14–18)
IMM GRANULOCYTES # BLD AUTO: 0.03 K/UL (ref 0–0.04)
IMM GRANULOCYTES NFR BLD AUTO: 0.4 % (ref 0–0.5)
LYMPHOCYTES # BLD AUTO: 2.1 K/UL (ref 1–4.8)
LYMPHOCYTES NFR BLD: 25.8 % (ref 18–48)
MAGNESIUM SERPL-MCNC: 1.7 MG/DL (ref 1.6–2.6)
MCH RBC QN AUTO: 29.6 PG (ref 27–31)
MCHC RBC AUTO-ENTMCNC: 35 G/DL (ref 32–36)
MCV RBC AUTO: 85 FL (ref 82–98)
MONOCYTES # BLD AUTO: 0.8 K/UL (ref 0.3–1)
MONOCYTES NFR BLD: 10.3 % (ref 4–15)
NEUTROPHILS # BLD AUTO: 5.1 K/UL (ref 1.8–7.7)
NEUTROPHILS NFR BLD: 63.1 % (ref 38–73)
NRBC BLD-RTO: 0 /100 WBC
PLATELET # BLD AUTO: 133 K/UL (ref 150–450)
PMV BLD AUTO: 12 FL (ref 9.2–12.9)
POCT GLUCOSE: 185 MG/DL (ref 70–110)
POTASSIUM SERPL-SCNC: 4.5 MMOL/L (ref 3.5–5.1)
PROT SERPL-MCNC: 5.7 G/DL (ref 6–8.4)
RBC # BLD AUTO: 4.96 M/UL (ref 4.6–6.2)
SODIUM SERPL-SCNC: 142 MMOL/L (ref 136–145)
WBC # BLD AUTO: 8 K/UL (ref 3.9–12.7)

## 2022-05-19 PROCEDURE — A4216 STERILE WATER/SALINE, 10 ML: HCPCS | Performed by: PHYSICIAN ASSISTANT

## 2022-05-19 PROCEDURE — 80053 COMPREHEN METABOLIC PANEL: CPT | Performed by: PHYSICIAN ASSISTANT

## 2022-05-19 PROCEDURE — 99217 PR OBSERVATION CARE DISCHARGE: ICD-10-PCS | Mod: ,,, | Performed by: INTERNAL MEDICINE

## 2022-05-19 PROCEDURE — 25000003 PHARM REV CODE 250: Performed by: INTERNAL MEDICINE

## 2022-05-19 PROCEDURE — 99217 PR OBSERVATION CARE DISCHARGE: CPT | Mod: ,,, | Performed by: INTERNAL MEDICINE

## 2022-05-19 PROCEDURE — G0378 HOSPITAL OBSERVATION PER HR: HCPCS

## 2022-05-19 PROCEDURE — 36415 COLL VENOUS BLD VENIPUNCTURE: CPT | Performed by: PHYSICIAN ASSISTANT

## 2022-05-19 PROCEDURE — 25000003 PHARM REV CODE 250: Performed by: PHYSICIAN ASSISTANT

## 2022-05-19 PROCEDURE — 63600175 PHARM REV CODE 636 W HCPCS: Performed by: PHYSICIAN ASSISTANT

## 2022-05-19 PROCEDURE — 83735 ASSAY OF MAGNESIUM: CPT | Performed by: PHYSICIAN ASSISTANT

## 2022-05-19 PROCEDURE — 63600175 PHARM REV CODE 636 W HCPCS: Performed by: INTERNAL MEDICINE

## 2022-05-19 PROCEDURE — 96376 TX/PRO/DX INJ SAME DRUG ADON: CPT

## 2022-05-19 PROCEDURE — 96372 THER/PROPH/DIAG INJ SC/IM: CPT

## 2022-05-19 PROCEDURE — 85025 COMPLETE CBC W/AUTO DIFF WBC: CPT | Performed by: PHYSICIAN ASSISTANT

## 2022-05-19 RX ORDER — ONDANSETRON 4 MG/1
4 TABLET, FILM COATED ORAL 2 TIMES DAILY
Qty: 30 TABLET | Refills: 0 | Status: SHIPPED | OUTPATIENT
Start: 2022-05-19 | End: 2022-06-28 | Stop reason: SDUPTHER

## 2022-05-19 RX ADMIN — INSULIN ASPART 2 UNITS: 100 INJECTION, SOLUTION INTRAVENOUS; SUBCUTANEOUS at 10:05

## 2022-05-19 RX ADMIN — HYDROMORPHONE HYDROCHLORIDE 1 MG: 1 INJECTION, SOLUTION INTRAMUSCULAR; INTRAVENOUS; SUBCUTANEOUS at 12:05

## 2022-05-19 RX ADMIN — HYDROMORPHONE HYDROCHLORIDE 1 MG: 1 INJECTION, SOLUTION INTRAMUSCULAR; INTRAVENOUS; SUBCUTANEOUS at 05:05

## 2022-05-19 RX ADMIN — Medication 10 ML: at 05:05

## 2022-05-19 RX ADMIN — PANTOPRAZOLE SODIUM 40 MG: 40 TABLET, DELAYED RELEASE ORAL at 08:05

## 2022-05-19 RX ADMIN — HYDROMORPHONE HYDROCHLORIDE 1 MG: 1 INJECTION, SOLUTION INTRAMUSCULAR; INTRAVENOUS; SUBCUTANEOUS at 08:05

## 2022-05-19 RX ADMIN — ONDANSETRON 4 MG: 2 INJECTION INTRAMUSCULAR; INTRAVENOUS at 08:05

## 2022-05-19 RX ADMIN — CARVEDILOL 25 MG: 12.5 TABLET, FILM COATED ORAL at 08:05

## 2022-05-19 RX ADMIN — AMLODIPINE BESYLATE 10 MG: 5 TABLET ORAL at 08:05

## 2022-05-19 NOTE — NURSING
Pt is being discharged per MD orders, no distressed noted. Pt discharge planning was reviewed with pt and medication was delivery to bedside. Pt verbalize understanding of follow up appt. Pt is being transported via wheelchair to personal transportation home.

## 2022-05-19 NOTE — DISCHARGE SUMMARY
"[facility] Medicine  Discharge Summary      Patient Name: Emery Barragan  MRN: [id number]  Patient Class: OP- Observation  Admission Date: 5/17/2022  Hospital Length of Stay: 0 days  Discharge Date and Time:  05/19/2022 10:50 AM  Attending Physician: Melida Washington MD   Discharging Provider: Melida Washington MD  Primary Care Provider: [facility]      HPI:   62 y/o M with history of DM2, HTN, and tobacco abuse who presented to the ED today complaining of abdominal pain and vomiting. He reports vomiting began yesterday followed by lower abdominal pain, which occurs after he vomits. He reports several episodes of bilious vomiting prompting ED evaluation. He denies associated fever, chills, diarrhea, dysuria. He was hospitalized last month for the same and symptoms resolved with symptomatic treatment. Denies alcohol use but reports daily "heavy" marijuana use.       Procedure(s) (LRB):  EGD (ESOPHAGOGASTRODUODENOSCOPY) (N/A)      Hospital Course:   Patient admitted with SHARI due to vomiting. He was admitted here 1 month prior with similar symptoms and work-up was unremarkable. He was started on IVF and symptomatic treatment with antiemetics. Given recurrent symptoms, GI consulted. He had EGD on 5/18 that showed mild gastritis and small hiatal hernia but otherwise unremarkable. Renal function improved with IVF. Diet slowly advanced and tolerated. He is feeling improved and stable for discharge home today. Counseled on cessation of marijuana. He will follow-up with PCP outpatient for gastric emptying study.       Goals of Care Treatment Preferences:  Code Status: Full Code      Consults:   Consults (From admission, onward)        Status Ordering Provider     Inpatient consult to Gastroenterology  Once        Provider:  Darrian Breen MD    Completed MELIDA WASHINGTON          No new Assessment & Plan notes have been filed under this hospital service since the last note was " generated.  Service: Hospital Medicine    Final Active Diagnoses:    Diagnosis Date Noted POA    CKD (chronic kidney disease) stage 3, GFR 30-59 ml/min [N18.30] 04/05/2022 Yes    Celiac artery aneurysm [I72.8] 04/05/2022 Yes    Type 2 diabetes mellitus, with long-term current use of insulin [E11.9, Z79.4] 04/25/2016 Not Applicable    Essential hypertension [I10] 04/25/2016 Yes      Problems Resolved During this Admission:    Diagnosis Date Noted Date Resolved POA    PRINCIPAL PROBLEM:  SHARI (acute kidney injury) [N17.9] 05/17/2022 05/19/2022 Yes    Lower abdominal pain [R10.30] 05/17/2022 05/19/2022 Yes    Non-intractable vomiting with nausea [R11.2] 04/24/2016 05/19/2022 Yes       Discharged Condition: good    Disposition: Home or Self Care    Follow Up:   Follow-up Information     [facility] Follow up in 2 week(s).    Why: Hospital follow-up and scheduling of gastric emptying study  Contact information:  [address] [place] [id number] [phone]                       Patient Instructions:      Diet diabetic     Activity as tolerated       Significant Diagnostic Studies: Labs: All labs within the past 24 hours have been reviewed    Pending Diagnostic Studies:     Procedure Component Value Units Date/Time    Specimen to Pathology, Surgery Gastrointestinal tract [[id number]] Collected: 05/18/22 0712    Order Status: Sent Lab Status: In process Updated: 05/18/22 1128    Specimen: Tissue          Medications:  Reconciled Home Medications:      Medication List      START taking these medications    ondansetron 4 MG tablet  Commonly known as: ZOFRAN  Take 1 tablet (4 mg total) by mouth 2 (two) times daily.        CONTINUE taking these medications    amLODIPine 10 MG tablet  Commonly known as: NORVASC  Take 10 mg by mouth once daily.     carvediloL 25 MG tablet  Commonly known as: COREG  Take 25 mg by mouth 2 (two) times daily.     HYDROcodone-acetaminophen  mg per  tablet  Commonly known as: NORCO  Take 1 tablet by mouth daily as needed.     insulin glargine 100 unit/mL injection  Commonly known as: Lantus  Inject 40 Units into the skin every morning.     losartan-hydrochlorothiazide 100-25 mg 100-25 mg per tablet  Commonly known as: HYZAAR  Take 1 tablet by mouth every morning.     NovoLOG U-100 Insulin aspart 100 unit/mL injection  Generic drug: insulin aspart U-100  Inject 5-10 Units into the skin 3 (three) times daily with meals.     omeprazole 40 MG capsule  Commonly known as: PRILOSEC  SMARTSI Capsule(s) By Mouth Every Evening            Indwelling Lines/Drains at time of discharge:   Lines/Drains/Airways     None                 Time spent on the discharge of patient: 30 minutes         Melida Billy MD  Department of Hospital Medicine  [facility] ([place])

## 2022-05-19 NOTE — PLAN OF CARE
05/19/22 1055   Final Note   Assessment Type Final Discharge Note   Anticipated Discharge Disposition Home   Hospital Resources/Appts/Education Provided Provided patient/caregiver with written discharge plan information;Appointments scheduled by Navigator/Coordinator;Appointments scheduled and added to AVS   Post-Acute Status   Discharge Delays None known at this time       Patient states he lives independently at home  with a roommate.     Family will provide transportation home.    No DC needs from CM perspective.

## 2022-05-23 LAB
FINAL PATHOLOGIC DIAGNOSIS: NORMAL
GROSS: NORMAL
Lab: NORMAL

## 2022-05-24 ENCOUNTER — TELEPHONE (OUTPATIENT)
Dept: INTERNAL MEDICINE | Facility: CLINIC | Age: 62
End: 2022-05-24
Payer: MEDICARE

## 2022-05-24 NOTE — TELEPHONE ENCOUNTER
"Pt is scheduled for an appt this week w/ Dr. Marin who will be out sick.    called pt and LVM    Canceled appt  Routing to staff for additional attempts  Pt's appt was for hosp f/u and also said "NP" in the notes.     Pt likely needs to be scheduled w/ another provider who is actively accepting new patients, but could potentially be scheduled for a hosp f/u with [facility] or another provider while he waits for his est care appt  "

## 2022-05-25 NOTE — TELEPHONE ENCOUNTER
Spoke with pt who stated he is all set up with [facility] and his primary doctor is ordering some test on him.  Pt does not need a hospital follow up.

## 2022-06-28 ENCOUNTER — HOSPITAL ENCOUNTER (EMERGENCY)
Facility: OTHER | Age: 62
Discharge: HOME | End: 2022-06-28
Attending: EMERGENCY MEDICINE
Payer: MEDICARE

## 2022-06-28 VITALS
HEART RATE: 95 BPM | SYSTOLIC BLOOD PRESSURE: 186 MMHG | WEIGHT: 170 LBS | DIASTOLIC BLOOD PRESSURE: 116 MMHG | HEIGHT: 69 IN | OXYGEN SATURATION: 99 % | RESPIRATION RATE: 17 BRPM | BODY MASS INDEX: 25.18 KG/M2 | TEMPERATURE: 99 F

## 2022-06-28 DIAGNOSIS — R10.33 PERIUMBILICAL ABDOMINAL PAIN: Primary | ICD-10-CM

## 2022-06-28 DIAGNOSIS — R07.9 CHEST PAIN: ICD-10-CM

## 2022-06-28 LAB
ALBUMIN SERPL BCP-MCNC: 4.4 G/DL (ref 3.5–5.2)
ALP SERPL-CCNC: 68 U/L (ref 55–135)
ALT SERPL W/O P-5'-P-CCNC: 30 U/L (ref 10–44)
ANION GAP SERPL CALC-SCNC: 13 MMOL/L (ref 8–16)
ANION GAP SERPL CALC-SCNC: 18 MMOL/L (ref 8–16)
AST SERPL-CCNC: 26 U/L (ref 10–40)
BACTERIA #/AREA URNS HPF: ABNORMAL /HPF
BASOPHILS # BLD AUTO: 0.02 K/UL (ref 0–0.2)
BASOPHILS # BLD AUTO: 0.03 K/UL (ref 0–0.2)
BASOPHILS NFR BLD: 0.2 % (ref 0–1.9)
BASOPHILS NFR BLD: 0.2 % (ref 0–1.9)
BILIRUB SERPL-MCNC: 0.7 MG/DL (ref 0.1–1)
BILIRUB UR QL STRIP: ABNORMAL
BUN SERPL-MCNC: 20 MG/DL (ref 8–23)
BUN SERPL-MCNC: 20 MG/DL (ref 8–23)
CALCIUM SERPL-MCNC: 10.8 MG/DL (ref 8.7–10.5)
CALCIUM SERPL-MCNC: 9.9 MG/DL (ref 8.7–10.5)
CHLORIDE SERPL-SCNC: 103 MMOL/L (ref 95–110)
CHLORIDE SERPL-SCNC: 107 MMOL/L (ref 95–110)
CLARITY UR: CLEAR
CO2 SERPL-SCNC: 19 MMOL/L (ref 23–29)
CO2 SERPL-SCNC: 20 MMOL/L (ref 23–29)
COLOR UR: YELLOW
CREAT SERPL-MCNC: 1.7 MG/DL (ref 0.5–1.4)
CREAT SERPL-MCNC: 1.9 MG/DL (ref 0.5–1.4)
DIFFERENTIAL METHOD: ABNORMAL
DIFFERENTIAL METHOD: ABNORMAL
EOSINOPHIL # BLD AUTO: 0 K/UL (ref 0–0.5)
EOSINOPHIL # BLD AUTO: 0 K/UL (ref 0–0.5)
EOSINOPHIL NFR BLD: 0.1 % (ref 0–8)
EOSINOPHIL NFR BLD: 0.3 % (ref 0–8)
ERYTHROCYTE [DISTWIDTH] IN BLOOD BY AUTOMATED COUNT: 13.2 % (ref 11.5–14.5)
ERYTHROCYTE [DISTWIDTH] IN BLOOD BY AUTOMATED COUNT: 13.5 % (ref 11.5–14.5)
EST. GFR  (AFRICAN AMERICAN): 42 ML/MIN/1.73 M^2
EST. GFR  (AFRICAN AMERICAN): 49 ML/MIN/1.73 M^2
EST. GFR  (NON AFRICAN AMERICAN): 37 ML/MIN/1.73 M^2
EST. GFR  (NON AFRICAN AMERICAN): 42 ML/MIN/1.73 M^2
GLUCOSE SERPL-MCNC: 208 MG/DL (ref 70–110)
GLUCOSE SERPL-MCNC: 215 MG/DL (ref 70–110)
GLUCOSE UR QL STRIP: ABNORMAL
HCT VFR BLD AUTO: 53.2 % (ref 40–54)
HCT VFR BLD AUTO: 54.5 % (ref 40–54)
HGB BLD-MCNC: 18.3 G/DL (ref 14–18)
HGB BLD-MCNC: 18.9 G/DL (ref 14–18)
HGB UR QL STRIP: NEGATIVE
HIV 1+2 AB+HIV1 P24 AG SERPL QL IA: NEGATIVE
HYALINE CASTS #/AREA URNS LPF: 15 /LPF
IMM GRANULOCYTES # BLD AUTO: 0.04 K/UL (ref 0–0.04)
IMM GRANULOCYTES # BLD AUTO: 0.06 K/UL (ref 0–0.04)
IMM GRANULOCYTES NFR BLD AUTO: 0.3 % (ref 0–0.5)
IMM GRANULOCYTES NFR BLD AUTO: 0.5 % (ref 0–0.5)
KETONES UR QL STRIP: ABNORMAL
LEUKOCYTE ESTERASE UR QL STRIP: NEGATIVE
LIPASE SERPL-CCNC: 33 U/L (ref 4–60)
LYMPHOCYTES # BLD AUTO: 2.7 K/UL (ref 1–4.8)
LYMPHOCYTES # BLD AUTO: 2.8 K/UL (ref 1–4.8)
LYMPHOCYTES NFR BLD: 20.7 % (ref 18–48)
LYMPHOCYTES NFR BLD: 20.9 % (ref 18–48)
MCH RBC QN AUTO: 29.6 PG (ref 27–31)
MCH RBC QN AUTO: 30 PG (ref 27–31)
MCHC RBC AUTO-ENTMCNC: 34.4 G/DL (ref 32–36)
MCHC RBC AUTO-ENTMCNC: 34.7 G/DL (ref 32–36)
MCV RBC AUTO: 86 FL (ref 82–98)
MCV RBC AUTO: 86 FL (ref 82–98)
MICROSCOPIC COMMENT: ABNORMAL
MONOCYTES # BLD AUTO: 0.6 K/UL (ref 0.3–1)
MONOCYTES # BLD AUTO: 0.9 K/UL (ref 0.3–1)
MONOCYTES NFR BLD: 4.5 % (ref 4–15)
MONOCYTES NFR BLD: 7 % (ref 4–15)
NEUTROPHILS # BLD AUTO: 10 K/UL (ref 1.8–7.7)
NEUTROPHILS # BLD AUTO: 9.3 K/UL (ref 1.8–7.7)
NEUTROPHILS NFR BLD: 71.1 % (ref 38–73)
NEUTROPHILS NFR BLD: 74.2 % (ref 38–73)
NITRITE UR QL STRIP: NEGATIVE
NRBC BLD-RTO: 0 /100 WBC
NRBC BLD-RTO: 0 /100 WBC
PH UR STRIP: 6 [PH] (ref 5–8)
PLATELET # BLD AUTO: 177 K/UL (ref 150–450)
PLATELET # BLD AUTO: 180 K/UL (ref 150–450)
PMV BLD AUTO: 11.8 FL (ref 9.2–12.9)
PMV BLD AUTO: 11.9 FL (ref 9.2–12.9)
POCT GLUCOSE: 215 MG/DL (ref 70–110)
POTASSIUM SERPL-SCNC: 3.7 MMOL/L (ref 3.5–5.1)
POTASSIUM SERPL-SCNC: 3.8 MMOL/L (ref 3.5–5.1)
PROT SERPL-MCNC: 8 G/DL (ref 6–8.4)
PROT UR QL STRIP: ABNORMAL
RBC # BLD AUTO: 6.19 M/UL (ref 4.6–6.2)
RBC # BLD AUTO: 6.31 M/UL (ref 4.6–6.2)
RBC #/AREA URNS HPF: 3 /HPF (ref 0–4)
SODIUM SERPL-SCNC: 140 MMOL/L (ref 136–145)
SODIUM SERPL-SCNC: 140 MMOL/L (ref 136–145)
SP GR UR STRIP: 1.02 (ref 1–1.03)
URN SPEC COLLECT METH UR: ABNORMAL
UROBILINOGEN UR STRIP-ACNC: 1 EU/DL
WBC # BLD AUTO: 13.07 K/UL (ref 3.9–12.7)
WBC # BLD AUTO: 13.46 K/UL (ref 3.9–12.7)
WBC #/AREA URNS HPF: 12 /HPF (ref 0–5)

## 2022-06-28 PROCEDURE — 93010 EKG 12-LEAD: ICD-10-PCS | Mod: ,,, | Performed by: INTERNAL MEDICINE

## 2022-06-28 PROCEDURE — 85025 COMPLETE CBC W/AUTO DIFF WBC: CPT | Mod: 91 | Performed by: EMERGENCY MEDICINE

## 2022-06-28 PROCEDURE — 93010 ELECTROCARDIOGRAM REPORT: CPT | Mod: ,,, | Performed by: INTERNAL MEDICINE

## 2022-06-28 PROCEDURE — 99285 EMERGENCY DEPT VISIT HI MDM: CPT | Mod: 25

## 2022-06-28 PROCEDURE — 93005 ELECTROCARDIOGRAM TRACING: CPT

## 2022-06-28 PROCEDURE — 82962 GLUCOSE BLOOD TEST: CPT

## 2022-06-28 PROCEDURE — 83690 ASSAY OF LIPASE: CPT | Performed by: NURSE PRACTITIONER

## 2022-06-28 PROCEDURE — 96361 HYDRATE IV INFUSION ADD-ON: CPT

## 2022-06-28 PROCEDURE — 85025 COMPLETE CBC W/AUTO DIFF WBC: CPT | Performed by: NURSE PRACTITIONER

## 2022-06-28 PROCEDURE — 25000003 PHARM REV CODE 250: Performed by: EMERGENCY MEDICINE

## 2022-06-28 PROCEDURE — 87389 HIV-1 AG W/HIV-1&-2 AB AG IA: CPT | Performed by: EMERGENCY MEDICINE

## 2022-06-28 PROCEDURE — 96375 TX/PRO/DX INJ NEW DRUG ADDON: CPT

## 2022-06-28 PROCEDURE — 80053 COMPREHEN METABOLIC PANEL: CPT | Performed by: NURSE PRACTITIONER

## 2022-06-28 PROCEDURE — 81000 URINALYSIS NONAUTO W/SCOPE: CPT | Performed by: NURSE PRACTITIONER

## 2022-06-28 PROCEDURE — 96365 THER/PROPH/DIAG IV INF INIT: CPT

## 2022-06-28 PROCEDURE — 63600175 PHARM REV CODE 636 W HCPCS: Performed by: EMERGENCY MEDICINE

## 2022-06-28 PROCEDURE — 96376 TX/PRO/DX INJ SAME DRUG ADON: CPT

## 2022-06-28 PROCEDURE — 87086 URINE CULTURE/COLONY COUNT: CPT | Performed by: NURSE PRACTITIONER

## 2022-06-28 PROCEDURE — 80048 BASIC METABOLIC PNL TOTAL CA: CPT | Mod: XB | Performed by: EMERGENCY MEDICINE

## 2022-06-28 RX ORDER — DROPERIDOL 2.5 MG/ML
1.25 INJECTION, SOLUTION INTRAMUSCULAR; INTRAVENOUS
Status: COMPLETED | OUTPATIENT
Start: 2022-06-28 | End: 2022-06-28

## 2022-06-28 RX ORDER — FAMOTIDINE 20 MG/1
20 TABLET, FILM COATED ORAL 2 TIMES DAILY
Qty: 60 TABLET | Refills: 0 | Status: SHIPPED | OUTPATIENT
Start: 2022-06-28 | End: 2023-06-28

## 2022-06-28 RX ORDER — SUCRALFATE 1 G/10ML
1 SUSPENSION ORAL
Status: COMPLETED | OUTPATIENT
Start: 2022-06-28 | End: 2022-06-28

## 2022-06-28 RX ORDER — OXYCODONE AND ACETAMINOPHEN 5; 325 MG/1; MG/1
1 TABLET ORAL
Status: COMPLETED | OUTPATIENT
Start: 2022-06-28 | End: 2022-06-28

## 2022-06-28 RX ORDER — MORPHINE SULFATE 4 MG/ML
4 INJECTION, SOLUTION INTRAMUSCULAR; INTRAVENOUS
Status: COMPLETED | OUTPATIENT
Start: 2022-06-28 | End: 2022-06-28

## 2022-06-28 RX ORDER — ONDANSETRON 2 MG/ML
4 INJECTION INTRAMUSCULAR; INTRAVENOUS
Status: COMPLETED | OUTPATIENT
Start: 2022-06-28 | End: 2022-06-28

## 2022-06-28 RX ORDER — ONDANSETRON 4 MG/1
4 TABLET, FILM COATED ORAL EVERY 8 HOURS PRN
Qty: 15 TABLET | Refills: 0 | Status: SHIPPED | OUTPATIENT
Start: 2022-06-28

## 2022-06-28 RX ORDER — HYOSCYAMINE SULFATE 0.12 MG/1
0.12 TABLET SUBLINGUAL
Status: COMPLETED | OUTPATIENT
Start: 2022-06-28 | End: 2022-06-28

## 2022-06-28 RX ORDER — DROPERIDOL 2.5 MG/ML
2.5 INJECTION, SOLUTION INTRAMUSCULAR; INTRAVENOUS
Status: COMPLETED | OUTPATIENT
Start: 2022-06-28 | End: 2022-06-28

## 2022-06-28 RX ADMIN — SODIUM CHLORIDE 1000 ML: 0.9 INJECTION, SOLUTION INTRAVENOUS at 02:06

## 2022-06-28 RX ADMIN — HYOSCYAMINE SULFATE 0.12 MG: 0.12 TABLET ORAL; SUBLINGUAL at 07:06

## 2022-06-28 RX ADMIN — SODIUM CHLORIDE 1000 ML: 0.9 INJECTION, SOLUTION INTRAVENOUS at 04:06

## 2022-06-28 RX ADMIN — DROPERIDOL 2.5 MG: 2.5 INJECTION, SOLUTION INTRAMUSCULAR; INTRAVENOUS at 09:06

## 2022-06-28 RX ADMIN — PROMETHAZINE HYDROCHLORIDE 12.5 MG: 25 INJECTION INTRAMUSCULAR; INTRAVENOUS at 05:06

## 2022-06-28 RX ADMIN — MORPHINE SULFATE 4 MG: 4 INJECTION, SOLUTION INTRAMUSCULAR; INTRAVENOUS at 04:06

## 2022-06-28 RX ADMIN — SUCRALFATE 1 G: 1 SUSPENSION ORAL at 08:06

## 2022-06-28 RX ADMIN — ONDANSETRON 4 MG: 2 INJECTION INTRAMUSCULAR; INTRAVENOUS at 02:06

## 2022-06-28 RX ADMIN — OXYCODONE HYDROCHLORIDE AND ACETAMINOPHEN 1 TABLET: 5; 325 TABLET ORAL at 09:06

## 2022-06-28 RX ADMIN — MORPHINE SULFATE 4 MG: 4 INJECTION, SOLUTION INTRAMUSCULAR; INTRAVENOUS at 07:06

## 2022-06-28 RX ADMIN — DROPERIDOL 1.25 MG: 2.5 INJECTION, SOLUTION INTRAMUSCULAR; INTRAVENOUS at 06:06

## 2022-06-28 NOTE — ED PROVIDER NOTES
Encounter Date: 6/28/2022    SCRIBE #1 NOTE: INhung, am scribing for, and in the presence of, Joss Ballard MD.       History     Chief Complaint   Patient presents with    Vomiting    Diarrhea    Abdominal Pain     Pt c.o mid abd pain with vomiting and diarrhea onset last night.   Pt states he has been seen here in the past for same complaint. AAO x 3 nadn skin w.d  mucus membrane dry     Time seen by provider: 3:05 PM    Emery Barragan is a 61 y.o. male, with a PMHx of HTN and DM, who presents to the ED with diarrhea that began last night at midnight. The patient had a recent admission 1 month ago for vomiting and SHARI, as well as several other similar past admissions. In addition to his diarrhea, the patient reports 1 episode of vomiting early this morning, but denies feeling nauseous now here in the ED. He does state that he is experiencing midline abdominal pain, which he reports he has had in the past when eating but is now experiencing constantly. The patient notes that he began to feel upper right chest pain/tightness when he arrived in the ED. He denies any recent abnormal foods and states that he has a good diet. The patient denies EtOH use but admits to marijuana use. This is the extent of the patient's complaints today in the Emergency Department.     The history is provided by the patient.     Review of patient's allergies indicates:  No Known Allergies  Past Medical History:   Diagnosis Date    Chronic back pain     Diabetes mellitus     Gallstones     Glaucoma (increased eye pressure)     Hepatitis C     Hypertension      Past Surgical History:   Procedure Laterality Date    CHOLECYSTECTOMY      COLONOSCOPY N/A 4/27/2016    Procedure: COLONOSCOPY;  Surgeon: Andre Sanchez MD;  Location: [facility];  Service: Endoscopy;  Laterality: N/A;    ESOPHAGOGASTRODUODENOSCOPY N/A 8/9/2019    Procedure: ESOPHAGOGASTRODUODENOSCOPY (EGD);  Surgeon: Mike Garcia MD;  Location:  [facility];  Service: Endoscopy;  Laterality: N/A;    ESOPHAGOGASTRODUODENOSCOPY N/A 5/18/2022    Procedure: EGD (ESOPHAGOGASTRODUODENOSCOPY);  Surgeon: Darrian Breen MD;  Location: [facility];  Service: Gastroenterology;  Laterality: N/A;     Family History   Problem Relation Age of Onset    Diabetes Mother     Kidney disease Mother     Heart disease Mother      Social History     Tobacco Use    Smoking status: Current Every Day Smoker     Packs/day: 1.00     Types: Cigarettes    Smokeless tobacco: Never Used   Substance Use Topics    Alcohol use: No    Drug use: No     Review of Systems   Constitutional: Negative for fever.   HENT: Negative for sore throat.    Respiratory: Negative for shortness of breath.    Cardiovascular: Positive for chest pain (upper right).   Gastrointestinal: Positive for abdominal pain (midline), diarrhea, nausea (resolved) and vomiting.   Genitourinary: Negative for dysuria.   Musculoskeletal: Negative for back pain.   Skin: Negative for rash.   Neurological: Negative for weakness.   Hematological: Does not bruise/bleed easily.       Physical Exam     Initial Vitals [06/28/22 1155]   BP Pulse Resp Temp SpO2   (!) 125/94 84 18 98.4 °F (36.9 °C) 99 %      MAP       --         Physical Exam    Nursing note and vitals reviewed.  Constitutional: He appears well-developed and well-nourished. He is not diaphoretic. No distress.   HENT:   Head: Normocephalic and atraumatic.   Dry mucous membranes.   Eyes: Conjunctivae are normal.   Neck: Neck supple.   Cardiovascular: Normal rate, regular rhythm, normal heart sounds and intact distal pulses.   No murmur heard.  Pulmonary/Chest: Breath sounds normal. No respiratory distress. He has no wheezes. He has no rhonchi. He has no rales. He exhibits tenderness.   Right upper chest wall tenderness.   Abdominal: Abdomen is soft. There is abdominal tenderness.   Mild periumbilical tenderness. There is no rebound and no guarding.    Musculoskeletal:         General: No edema.      Cervical back: Neck supple.     Neurological: He is alert and oriented to person, place, and time. He has normal strength.   Skin: Skin is warm and dry.         ED Course   Procedures  Labs Reviewed   CBC W/ AUTO DIFFERENTIAL - Abnormal; Notable for the following components:       Result Value    WBC 13.07 (*)     RBC 6.31 (*)     Hemoglobin 18.9 (*)     Hematocrit 54.5 (*)     Gran # (ANC) 9.3 (*)     Immature Grans (Abs) 0.06 (*)     All other components within normal limits   COMPREHENSIVE METABOLIC PANEL - Abnormal; Notable for the following components:    CO2 19 (*)     Glucose 215 (*)     Creatinine 1.9 (*)     Calcium 10.8 (*)     Anion Gap 18 (*)     eGFR if  42 (*)     eGFR if non  37 (*)     All other components within normal limits   URINALYSIS, REFLEX TO URINE CULTURE - Abnormal; Notable for the following components:    Protein, UA 3+ (*)     Glucose, UA Trace (*)     Ketones, UA 1+ (*)     Bilirubin (UA) 2+ (*)     All other components within normal limits    Narrative:     Specimen Source->Urine   URINALYSIS MICROSCOPIC - Abnormal; Notable for the following components:    WBC, UA 12 (*)     Hyaline Casts, UA 15 (*)     All other components within normal limits    Narrative:     Specimen Source->Urine   BASIC METABOLIC PANEL - Abnormal; Notable for the following components:    CO2 20 (*)     Glucose 208 (*)     Creatinine 1.7 (*)     eGFR if  49 (*)     eGFR if non  42 (*)     All other components within normal limits   CBC W/ AUTO DIFFERENTIAL - Abnormal; Notable for the following components:    WBC 13.46 (*)     Hemoglobin 18.3 (*)     Gran # (ANC) 10.0 (*)     Gran % 74.2 (*)     All other components within normal limits   POCT GLUCOSE - Abnormal; Notable for the following components:    POCT Glucose 215 (*)     All other components within normal limits   CULTURE, URINE   LIPASE   HIV 1  / 2 ANTIBODY    Narrative:     Release to patient->Immediate     EKG Readings: (Independently Interpreted)   Normal sinus rhythm. Rate of 84 bpm. Biatrial enlargement. No acute ischemia or significant change.     ECG Results          EKG 12-lead (In process)  Result time 06/28/22 14:32:33    In process by Interface, Lab In [facility] (06/28/22 14:32:33)                 Narrative:    Test Reason : R07.9,    Vent. Rate : 084 BPM     Atrial Rate : 084 BPM     P-R Int : 178 ms          QRS Dur : 084 ms      QT Int : 380 ms       P-R-T Axes : 072 052 035 degrees     QTc Int : 449 ms    Normal sinus rhythm  Biatrial enlargement  Septal infarct ,age undetermined  Abnormal ECG      Referred By: [email or web address]   SELF           Confirmed By:                             Imaging Results          CT Abdomen Pelvis  Without Contrast (Final result)  Result time 06/28/22 20:50:21    Final result by Travon Grace MD (06/28/22 20:50:21)                 Impression:      1. No acute intra-abdominal abnormalities identified.  2. Additional stable findings as detailed above.      Electronically signed by: Travon Grace MD  Date:    06/28/2022  Time:    20:50             Narrative:    EXAMINATION:  CT ABDOMEN PELVIS WITHOUT CONTRAST    CLINICAL HISTORY:  Abdominal pain, acute, nonlocalized;    TECHNIQUE:  Low dose axial images, sagittal and coronal reformations were obtained from the lung bases to the pubic symphysis.  Oral contrast was not administered.    COMPARISON:  CTA abdomen and pelvis from 04/06/2022.    FINDINGS:  The visualized portion of the heart is unremarkable.  The lung bases are clear.    No significant hepatic abnormality seen on this noncontrast exam.  There is no intra-or extrahepatic biliary ductal dilatation.  Gallbladder has been removed.  There is unchanged bilateral adrenal thickening.  The stomach, pancreas, and spleen are unremarkable.    Kidneys show no evidence of stones or hydronephrosis.  Small hypodensity is  seen in each kidney, probable small cysts.  Ureters are unremarkable along their courses.  Urinary bladder is unremarkable.  Prostate is mildly enlarged.    Appendix is visualized and is unremarkable.  The visualized loops of small and large bowel show no evidence of obstruction or inflammation.  No free air or free fluid.    Aorta tapers normally.  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  Stable dilatation is seen of the right common iliac artery measuring 2.2 cm.    No acute osseous abnormality identified. Subcutaneous soft tissue structures show no significant abnormalities.                                 Medications   sodium chloride 0.9% bolus 1,000 mL (0 mLs Intravenous Stopped 6/28/22 1559)   ondansetron injection 4 mg (4 mg Intravenous Given 6/28/22 1459)   sodium chloride 0.9% bolus 1,000 mL (0 mLs Intravenous Stopped 6/28/22 1753)   morphine injection 4 mg (4 mg Intravenous Given 6/28/22 1650)   promethazine (PHENERGAN) 12.5 mg in dextrose 5 % 50 mL IVPB (0 mg Intravenous Stopped 6/28/22 1733)   droperidoL injection 1.25 mg (1.25 mg Intravenous Given 6/28/22 1821)   morphine injection 4 mg (4 mg Intravenous Given 6/28/22 1939)   hyoscyamine ODT 0.125 mg (0.125 mg Oral Given 6/28/22 1939)   sucralfate 100 mg/mL suspension 1 g (1 g Oral Given 6/28/22 2035)   droperidoL injection 2.5 mg (2.5 mg Intravenous Given 6/28/22 2103)   oxyCODONE-acetaminophen 5-325 mg per tablet 1 tablet (1 tablet Oral Given 6/28/22 2138)     Medical Decision Making:   History:   Old Medical Records: I decided to obtain old medical records.  Initial Assessment:       61-year-old male with history of HTN/DM presents to the ED for evaluation of periumbilical abdominal pain and diarrhea that started overnight, with numerous episodes overnight and 1 episode of emesis earlier.  He states he has had similar presentations when admitted here, and per chart review, he was admitted with similar  presentation 2 months ago and last month with intractable vomiting and SHARI.  He states his nausea is not severe currently, denies any suspected food poisoning or other clear precipitant.  He also complains of right-sided chest pain that started soon after ED arrival.  No fevers, blood in stool, URI symptoms or other associated complaints.  On arrival patient with normal vitals but does appear dehydrated.  He does have reproducible right upper chest wall pain, and EKG with no sign of ischemia, most likely musculoskeletal chest pain or esophagitis, no sign of ACS.  Etiology of recurrent episode of vomiting and diarrhea with abdominal pain could be related to gastroenteritis, cyclical vomiting from THC use, or gastroparesis from poorly controlled diabetes.  Concern for worsening of renal function, electrolyte abnormality; no recent antibiotic use or high suspicion for C diff.      Initial labs with hemoconcentrated CBC with WBC 13, and CR 1.9, worse from previous baseline around 1.5.  Anion gap also elevated to 18 with glucose 215, though more likely related to dehydration than DKA.  Patient felt nausea resolved after Zofran but persistent periumbilical abdominal pain after morphine and fluids.  Repeat labs with CR improved to 1.7, and cleared anion gap.  He still had persistent elevated WBC 13 and persistent periumbilical pain after 2nd dose of morphine, so CT abdomen done to rule out new intra-abdominal process as he has history of celiac artery aneurysm.  CT done with no acute findings.  I suspect he may have gastritis, PUD, or gastroparesis or cyclical vomiting as etiology abdominal pain.  Given improvement of anion gap and renal function no indication for admission at this time.  Patient states that Dilaudid improved his pain on previous admission and requesting this now, but he had no improvement with morphine so no indication for this now.  Patient was treated with droperidol and on reassessment he feels some  improvement of pain, still tolerating p.o. with no acute abdomen on reassessment.  Given improvement of renal function and able tolerate p.o. with no diarrhea episodes in the ED, I feel he is stable for discharge with GI follow-up as previously recommended, and will also start Pepcid b.i.d. and Zofran p.r.n..  Patient had GI appointment last week but had to cancel it, advised the reschedule it urgently for further evaluation of potential etiologies of her current episodes of periumbilical abdominal pain and vomiting.          Independently Interpreted Test(s):   I have ordered and independently interpreted EKG Reading(s) - see prior notes  Clinical Tests:   Lab Tests: Reviewed and Ordered  Medical Tests: Ordered and Reviewed                      Clinical Impression:   Final diagnoses:  [R07.9] Chest pain  [R10.33] Periumbilical abdominal pain (Primary)          ED Disposition Condition    Discharge Stable        ED Prescriptions     Medication Sig Dispense Start Date End Date Auth. Provider    famotidine (PEPCID) 20 MG tablet Take 1 tablet (20 mg total) by mouth 2 (two) times daily. 60 tablet 6/28/2022 6/28/2023 Joss Ballard MD    ondansetron (ZOFRAN) 4 MG tablet Take 1 tablet (4 mg total) by mouth every 8 (eight) hours as needed for Nausea. 15 tablet 6/28/2022  Joss Ballard MD        Follow-up Information     Follow up With Specialties Details Why Contact Info    GI clinic  Schedule an appointment as soon as possible for a visit in 3 days             Joss Ballard MD  06/28/22 2775

## 2022-06-28 NOTE — ED NOTES
Moved patient to a room with cardiac monitoring for droperidol injection. Pt admits to smoking marijuana 2 days ago. Pt reports this started yesterday, and states he did not smoke yesterday. Pt still reports pain 10/10. Will cont to monitor closely.

## 2022-06-28 NOTE — ED TRIAGE NOTES
61 y.o. male to ED with c.o. lower abdominal pain that started at 0000 today. Patient reports he got up at midnight to use the bathroom and has had 5 episodes of diarrhea and 4 episodes of vomiting since 0000. Patient denies hematemesis, denies fever/chills, denies chest pain/ cough/ shortness of breath.

## 2022-06-28 NOTE — FIRST PROVIDER EVALUATION
"Medical screening exam completed.  I have conducted a focused provider triage encounter, findings are as follows:    Brief history of present illness:  Diarrhea since 12 last night, stated with n/v since this am.  Unable to tolerate and oral intake.     Also reports CP since arrival to the ED    Vitals:    06/28/22 1155   BP: (!) 125/94   BP Location: Left arm   Patient Position: Sitting   Pulse: 84   Resp: 18   Temp: 98.4 °F (36.9 °C)   TempSrc: Oral   SpO2: 99%   Weight: 77.1 kg (170 lb)   Height: 5' 9" (1.753 m)       Pertinent physical exam:  Actively dry heaving    Brief workup plan:  Ekg, labs    Preliminary workup initiated; this workup will be continued and followed by the physician or advanced practice provider that is assigned to the patient when roomed.  "

## 2022-06-28 NOTE — ED NOTES
Pt states he has developed right sided chest pain since he came to the ED. Pt reports pain is 8/10 at this time. Pt states he has periumbilical pain 10/10

## 2022-06-30 LAB
BACTERIA UR CULT: NORMAL
BACTERIA UR CULT: NORMAL

## 2023-07-21 NOTE — ASSESSMENT & PLAN NOTE
Lower abdominal pain  - Pt with recurrent episodes of abdominal pain and vomiting  - Prior work-up unrevealing   - Given recurrent nature, could be THC hyperemesis vs gastroparesis  - GI consulted  - EGD (5/18) with submucosal nodule in L piriform sinus, mild gastritis, small hiatal hernia. Otherwise normal  - Continue supportive care with IVF, antiemetics, and PPI  - Did not tolerate regular diet this morning. Downgrade to full liquids. Advance as able  - Will need outpatient gastric emptying study   Recorded ECG: HR=69 Condition=Condition 1

## 2024-05-07 ENCOUNTER — PATIENT OUTREACH (OUTPATIENT)
Dept: ADMINISTRATIVE | Facility: HOSPITAL | Age: 64
End: 2024-05-07
Payer: MEDICARE

## 2024-10-18 ENCOUNTER — HOSPITAL ENCOUNTER (EMERGENCY)
Facility: OTHER | Age: 64
Discharge: HOME | End: 2024-10-18
Attending: EMERGENCY MEDICINE
Payer: MEDICARE

## 2024-10-18 VITALS
WEIGHT: 197 LBS | TEMPERATURE: 98 F | RESPIRATION RATE: 19 BRPM | DIASTOLIC BLOOD PRESSURE: 98 MMHG | OXYGEN SATURATION: 96 % | BODY MASS INDEX: 28.2 KG/M2 | HEART RATE: 69 BPM | HEIGHT: 70 IN | SYSTOLIC BLOOD PRESSURE: 158 MMHG

## 2024-10-18 DIAGNOSIS — J06.9 VIRAL URI WITH COUGH: Primary | ICD-10-CM

## 2024-10-18 DIAGNOSIS — J02.9 SORE THROAT: ICD-10-CM

## 2024-10-18 DIAGNOSIS — R73.9 HYPERGLYCEMIA: ICD-10-CM

## 2024-10-18 DIAGNOSIS — R09.82 POSTNASAL DRIP: ICD-10-CM

## 2024-10-18 LAB
ALBUMIN SERPL BCP-MCNC: 3.9 G/DL (ref 3.5–5.2)
ALP SERPL-CCNC: 73 U/L (ref 40–150)
ALT SERPL W/O P-5'-P-CCNC: 18 U/L (ref 10–44)
ANION GAP SERPL CALC-SCNC: 11 MMOL/L (ref 8–16)
AST SERPL-CCNC: 14 U/L (ref 10–40)
B-OH-BUTYR BLD STRIP-SCNC: 0.1 MMOL/L (ref 0–0.5)
BACTERIA #/AREA URNS HPF: NORMAL /HPF
BASOPHILS # BLD AUTO: 0.01 K/UL (ref 0–0.2)
BASOPHILS NFR BLD: 0.1 % (ref 0–1.9)
BILIRUB SERPL-MCNC: 0.5 MG/DL (ref 0.1–1)
BILIRUB UR QL STRIP: NEGATIVE
BUN SERPL-MCNC: 31 MG/DL (ref 8–23)
CALCIUM SERPL-MCNC: 10 MG/DL (ref 8.7–10.5)
CHLORIDE SERPL-SCNC: 104 MMOL/L (ref 95–110)
CLARITY UR: CLEAR
CO2 SERPL-SCNC: 23 MMOL/L (ref 23–29)
COLOR UR: YELLOW
CREAT SERPL-MCNC: 1.8 MG/DL (ref 0.5–1.4)
DIFFERENTIAL METHOD BLD: NORMAL
EOSINOPHIL # BLD AUTO: 0.1 K/UL (ref 0–0.5)
EOSINOPHIL NFR BLD: 0.8 % (ref 0–8)
ERYTHROCYTE [DISTWIDTH] IN BLOOD BY AUTOMATED COUNT: 12.9 % (ref 11.5–14.5)
EST. GFR  (NO RACE VARIABLE): 41 ML/MIN/1.73 M^2
GLUCOSE SERPL-MCNC: 339 MG/DL (ref 70–110)
GLUCOSE UR QL STRIP: ABNORMAL
HCO3 UR-SCNC: 20.3 MMOL/L (ref 24–28)
HCT VFR BLD AUTO: 46.4 % (ref 40–54)
HGB BLD-MCNC: 15.6 G/DL (ref 14–18)
HGB UR QL STRIP: ABNORMAL
HYALINE CASTS #/AREA URNS LPF: 0 /LPF
IMM GRANULOCYTES # BLD AUTO: 0.03 K/UL (ref 0–0.04)
IMM GRANULOCYTES NFR BLD AUTO: 0.3 % (ref 0–0.5)
KETONES UR QL STRIP: NEGATIVE
LEUKOCYTE ESTERASE UR QL STRIP: NEGATIVE
LYMPHOCYTES # BLD AUTO: 2.7 K/UL (ref 1–4.8)
LYMPHOCYTES NFR BLD: 26.8 % (ref 18–48)
MCH RBC QN AUTO: 29.3 PG (ref 27–31)
MCHC RBC AUTO-ENTMCNC: 33.6 G/DL (ref 32–36)
MCV RBC AUTO: 87 FL (ref 82–98)
MICROSCOPIC COMMENT: NORMAL
MONOCYTES # BLD AUTO: 0.8 K/UL (ref 0.3–1)
MONOCYTES NFR BLD: 7.9 % (ref 4–15)
NEUTROPHILS # BLD AUTO: 6.5 K/UL (ref 1.8–7.7)
NEUTROPHILS NFR BLD: 64.1 % (ref 38–73)
NITRITE UR QL STRIP: NEGATIVE
NRBC BLD-RTO: 0 /100 WBC
PCO2 BLDA: 34.8 MMHG (ref 35–45)
PH SMN: 7.38 [PH] (ref 7.35–7.45)
PH UR STRIP: 6 [PH] (ref 5–8)
PLATELET # BLD AUTO: 155 K/UL (ref 150–450)
PMV BLD AUTO: 11.2 FL (ref 9.2–12.9)
PO2 BLDA: 36 MMHG (ref 40–60)
POC BE: -5 MMOL/L
POC SATURATED O2: 68 % (ref 95–100)
POC TCO2: 21 MMOL/L (ref 24–29)
POCT GLUCOSE: 411 MG/DL (ref 70–110)
POTASSIUM SERPL-SCNC: 4.3 MMOL/L (ref 3.5–5.1)
PROT SERPL-MCNC: 6.7 G/DL (ref 6–8.4)
PROT UR QL STRIP: ABNORMAL
RBC # BLD AUTO: 5.33 M/UL (ref 4.6–6.2)
RBC #/AREA URNS HPF: 2 /HPF (ref 0–4)
SAMPLE: ABNORMAL
SODIUM SERPL-SCNC: 138 MMOL/L (ref 136–145)
SP GR UR STRIP: 1.02 (ref 1–1.03)
SQUAMOUS #/AREA URNS HPF: 0 /HPF
UNIDENT CRYS URNS QL MICRO: NORMAL
URN SPEC COLLECT METH UR: ABNORMAL
UROBILINOGEN UR STRIP-ACNC: NEGATIVE EU/DL
WBC # BLD AUTO: 10.14 K/UL (ref 3.9–12.7)
WBC #/AREA URNS HPF: 1 /HPF (ref 0–5)
YEAST URNS QL MICRO: NORMAL

## 2024-10-18 PROCEDURE — 93005 ELECTROCARDIOGRAM TRACING: CPT | Mod: HCNC

## 2024-10-18 PROCEDURE — 85025 COMPLETE CBC W/AUTO DIFF WBC: CPT | Performed by: NURSE PRACTITIONER

## 2024-10-18 PROCEDURE — 81000 URINALYSIS NONAUTO W/SCOPE: CPT | Performed by: NURSE PRACTITIONER

## 2024-10-18 PROCEDURE — 82010 KETONE BODYS QUAN: CPT | Performed by: NURSE PRACTITIONER

## 2024-10-18 PROCEDURE — 93010 ELECTROCARDIOGRAM REPORT: CPT | Mod: HCNC,,, | Performed by: INTERNAL MEDICINE

## 2024-10-18 PROCEDURE — 82962 GLUCOSE BLOOD TEST: CPT | Mod: HCNC

## 2024-10-18 PROCEDURE — 25000003 PHARM REV CODE 250: Performed by: NURSE PRACTITIONER

## 2024-10-18 PROCEDURE — 80053 COMPREHEN METABOLIC PANEL: CPT | Performed by: NURSE PRACTITIONER

## 2024-10-18 PROCEDURE — 99285 EMERGENCY DEPT VISIT HI MDM: CPT | Mod: 25,HCNC

## 2024-10-18 RX ORDER — DOCUSATE SODIUM 100 MG
600 CAPSULE ORAL
Status: DISCONTINUED | OUTPATIENT
Start: 2024-10-18 | End: 2024-10-18 | Stop reason: HOSPADM

## 2024-10-18 RX ORDER — HYDRALAZINE HYDROCHLORIDE 25 MG/1
25 TABLET, FILM COATED ORAL
Status: COMPLETED | OUTPATIENT
Start: 2024-10-18 | End: 2024-10-18

## 2024-10-18 RX ORDER — MONTELUKAST SODIUM 10 MG/1
10 TABLET ORAL NIGHTLY
Qty: 30 TABLET | Refills: 0 | Status: SHIPPED | OUTPATIENT
Start: 2024-10-18 | End: 2024-11-17

## 2024-10-18 RX ORDER — BENZONATATE 100 MG/1
200 CAPSULE ORAL 3 TIMES DAILY PRN
Qty: 30 CAPSULE | Refills: 0 | Status: SHIPPED | OUTPATIENT
Start: 2024-10-18 | End: 2024-10-28

## 2024-10-18 RX ORDER — FLUTICASONE PROPIONATE 50 MCG
1 SPRAY, SUSPENSION (ML) NASAL 2 TIMES DAILY PRN
Qty: 15 G | Refills: 0 | Status: SHIPPED | OUTPATIENT
Start: 2024-10-18 | End: 2024-11-17

## 2024-10-18 RX ADMIN — Medication 600 ML: at 02:10

## 2024-10-18 RX ADMIN — HYDRALAZINE HYDROCHLORIDE 25 MG: 25 TABLET ORAL at 12:10

## 2024-10-18 NOTE — ED NOTES
600 mL pedialyte placed into a drinking container, placed at bedside. Pt educated on rehydration method and verbalized understanding.

## 2024-10-18 NOTE — DISCHARGE INSTRUCTIONS
You were seen and evaluated in the ER today.  Your labs show that your sugar is high in the ER today.  Please remember to take your insulin.  It also shows that you have a mild kidney injury.  Please attempt to stay hydrated with water, Gatorade, Powerade.  Take your medications as prescribed.  We have prescribed you medications to help with your sore throat and postnasal drip.  Please follow-up with your PCP as needed.  Please return to the ED for any worsening symptoms such as chest pain, shortness of breath, fever not controlled with Tylenol or ibuprofen or uncontrolled pain.      Our goal in the emergency department is to always give you outstanding care and exceptional service. You may receive a survey by mail or e-mail in the next week regarding your experience in our ED. We would greatly appreciate your completing and returning the survey. Your feedback provides us with a way to recognize our staff who give very good care and it helps us learn how to improve when your experience was below our aspiration of excellence.

## 2024-10-18 NOTE — ED PROVIDER NOTES
Source of History:  Patient, chart    Chief complaint:  Cough (Reports productive cough and dry mouth x 1 week. No improvement with OTC meds. Reports high blood sugar readings at home with increased thirst.  in triage )      HPI:  Emery Barragan is a 63 y.o. male with medical history of diabetes, hepatitis-C, hypertension, CKD presenting with cough and nasal congestion with associated dry mouth for the past week.  Patient states he feels like his mouth becomes so dry he has difficulties swallowing.  Patient also states his glucose has been running high over the past few days.  Patient states he did not take his blood pressure medications or his insulin this morning.    This is the extent to the patients complaints today here in the emergency department.    ROS: As per HPI and below:    Constitutional: No fever.  No chills.  Eyes: No visual changes.  ENT:  Positive for dry mouth.  No sore throat. No ear pain    Cardiovascular: No chest pain.  Respiratory:  Positive for cough.  No shortness of breath.  GI: No abdominal pain.  No nausea or vomiting.  Genitourinary: No abnormal urination.  Neurologic: No headache. No focal weakness.  No numbness.  MSK: no back pain.  Integument: No rashes or lesions.  Hematologic: No easy bruising.  Endocrine:  Positive for increased thirst.  Positive for increased urination.     Review of patient's allergies indicates:  No Known Allergies    PMH:  As per HPI and below:  Past Medical History:   Diagnosis Date    Chronic back pain     Diabetes mellitus     Gallstones     Glaucoma (increased eye pressure)     Hepatitis C     Hypertension      Past Surgical History:   Procedure Laterality Date    CHOLECYSTECTOMY      COLONOSCOPY N/A 4/27/2016    Procedure: COLONOSCOPY;  Surgeon: Andre Sanchez MD;  Location: [facility];  Service: Endoscopy;  Laterality: N/A;    ESOPHAGOGASTRODUODENOSCOPY N/A 8/9/2019    Procedure: ESOPHAGOGASTRODUODENOSCOPY (EGD);  Surgeon: Mike Garcia MD;   "Location: [facility];  Service: Endoscopy;  Laterality: N/A;    ESOPHAGOGASTRODUODENOSCOPY N/A 5/18/2022    Procedure: EGD (ESOPHAGOGASTRODUODENOSCOPY);  Surgeon: Darrian Breen MD;  Location: [facility];  Service: Gastroenterology;  Laterality: N/A;       Social History     Tobacco Use    Smoking status: Every Day     Current packs/day: 1.00     Types: Cigarettes    Smokeless tobacco: Never   Substance Use Topics    Alcohol use: No    Drug use: No       Physical Exam:    BP (!) 166/104   Pulse 62   Temp 98 °F (36.7 °C) (Oral)   Resp 15   Ht 5' 10" (1.778 m)   Wt 89.4 kg (197 lb)   SpO2 95%   BMI 28.27 kg/m²     Nursing note and vital signs reviewed.    Constitutional: No acute distress.  Nontoxic.  Hypertensive on initial exam.  All other vital signs stable.  Eyes: No conjunctival injection.Extraocular muscles are intact.  ENT: Oropharynx clear.  Normal phonation.   Cardiovascular: Regular rate and rhythm.  No murmurs. No gallops. No rubs  Respiratory: Clear to auscultation bilaterally.  Good air movement.  No wheezes.  No rhonchi. No rales. No accessory muscle use..  Abdomen: Soft.  Not distended.  Nontender.  No guarding.  No rebound. Non-peritoneal.  Musculoskeletal: Good range of motion all joints.  No deformities.  Neck supple.  No meningismus.  Skin: No rashes seen.  Good turgor.  No abrasions.  No ecchymoses.  Neurologic: Motor intact.  Sensation intact.  No ataxia. No focal neurological deficits.  Psych: Appropriate, conversant    MDM    Emergent evaluation of a 64 yo male presenting for cough, congestion, dry mouth, urinary frequency and increased thirst.  Patient states symptoms began approximately 1 week ago.  Patient states his sugars have been running high but he has not taken any of his medications this morning.  Patient denies any fevers or chills.  Patient denies any headache, blurred vision, chest pain.  On exam pt is A&Ox3. VSS. Nonfebrile and nontoxic appearing.  Mucous membranes pink " and moist. Breath sounds clear bilaterally.  Abdomen soft and nontender. No rebound or guarding appreciated on exam.   BS WNL.  Pt speaking in full sentences.  Steady gait appreciated. Cap refill < 3 seconds.      History Acquisition   Additional history was acquired from other historians.  Chart    The patient's list of active medical problems, social history, medications, and allergies as documented per RN staff has been reviewed.     Differential Diagnoses   Based on available information and the initial assessment, the working differential diagnoses considered during this evaluation include but are not limited to dehydration, electrolyte abnormality, viral URI, seasonal allergies, allergic rhinitis, medication noncompliance, hyperglycemia, DKA, others.    I will get labs, imaging, medicate and reassess.      LABS     Labs Reviewed   COMPREHENSIVE METABOLIC PANEL - Abnormal       Result Value    Sodium 138      Potassium 4.3      Chloride 104      CO2 23      Glucose 339 (*)     BUN 31 (*)     Creatinine 1.8 (*)     Calcium 10.0      Total Protein 6.7      Albumin 3.9      Total Bilirubin 0.5      Alkaline Phosphatase 73      AST 14      ALT 18      eGFR 41 (*)     Anion Gap 11     URINALYSIS, REFLEX TO URINE CULTURE - Abnormal    Specimen UA Urine, Clean Catch      Color, UA Yellow      Appearance, UA Clear      pH, UA 6.0      Specific Gravity, UA 1.025      Protein, UA 2+ (*)     Glucose, UA 4+ (*)     Ketones, UA Negative      Bilirubin (UA) Negative      Occult Blood UA Trace (*)     Nitrite, UA Negative      Urobilinogen, UA Negative      Leukocytes, UA Negative      Narrative:     Specimen Source->Urine   POCT GLUCOSE - Abnormal    POCT Glucose 411 (*)    ISTAT PROCEDURE - Abnormal    POC PH 7.375      POC PCO2 34.8 (*)     POC PO2 36 (*)     POC HCO3 20.3 (*)     POC BE -5 (*)     POC SATURATED O2 68      POC TCO2 21 (*)     Sample VENOUS     CBC W/ AUTO DIFFERENTIAL    WBC 10.14      RBC 5.33       Hemoglobin 15.6      Hematocrit 46.4      MCV 87      MCH 29.3      MCHC 33.6      RDW 12.9      Platelets 155      MPV 11.2      Immature Granulocytes 0.3      Gran # (ANC) 6.5      Immature Grans (Abs) 0.03      Lymph # 2.7      Mono # 0.8      Eos # 0.1      Baso # 0.01      nRBC 0      Gran % 64.1      Lymph % 26.8      Mono % 7.9      Eosinophil % 0.8      Basophil % 0.1      Differential Method Automated     BETA - HYDROXYBUTYRATE, SERUM    Beta-Hydroxybutyrate 0.1     URINALYSIS MICROSCOPIC    RBC, UA 2      WBC, UA 1      Bacteria None      Yeast, UA None      Squam Epithel, UA 0      Hyaline Casts, UA 0      Unclass Jillian UA Rare      Microscopic Comment SEE COMMENT      Narrative:     Specimen Source->Urine         Imaging     Imaging Results              X-Ray Chest 1 View (Final result)  Result time 10/18/24 11:35:01   Procedure changed from X-Ray Chest AP Portable     Final result by Blaise Cabrales MD (10/18/24 11:35:01)                   Impression:      No acute abnormality      Electronically signed by: Blaise Cabrales  Date:    10/18/2024  Time:    11:35               Narrative:    EXAMINATION:  XR CHEST 1 VIEW    CLINICAL HISTORY:  hyperglycemia;    TECHNIQUE:  Single frontal view of the chest was performed.    COMPARISON:  Chest radiograph 08/13/2018    FINDINGS:  Lines and tubes: None.    Heart and mediastinum: Unremarkable.    Pleura: No pleural effusion or pneumothorax.    Lungs: Lungs are well inflated. No focal consolidations or evidence of pulmonary edema.    Soft tissue/bone: Degenerative changes in the shoulders.                                      A radiology report was available for my review at the time of the encounter.    EKG   EKG Readings: (Independently Interpreted)   Initial Reading: No STEMI. Previous EKG Date: 6/28/22. Rhythm: Sinus Bradycardia. Heart Rate: 57.   My independent interpretation    No STEMI  Sinus bradycardia  Rate of 57       Additional Consideration   All available  testing was considered during the course of this workup.  Comorbidities taken into consideration during the patient's evaluation and treatment include weight, age.    Social determinants of health were taken into consideration during development of our treatment plan.    Medications   electrolytes-dextrose (Pedialyte) oral solution 600 mL (has no administration in time range)   hydrALAZINE tablet 25 mg (25 mg Oral Given 10/18/24 1253)      ED Course as of 10/18/24 1423   Fri Oct 18, 2024   1400 CBC unremarkable.  No leukocytosis noted.  H&H stable at 15.6 and 46.4.  CMP shows a mild SHARI with a BUN of 31 and a creatinine of 1.8.  UA negative for any acute infectious process.  Beta hydroxybutyrate negative at 0.1.  VBG shows a pH of 7.375.  No other abnormalities.  Chest x-ray independently reviewed by myself and Radiology.  Negative for any acute abnormality.  Patient did p.o. trial in the ED and tolerate 600 mL of Pedialyte to help with his SHARI. [RZ]   1420 Patient reassessed.  Patient advised will treat for his viral URI and postnasal drip.  Advised to continue to stay hydrated at home.  Take medications as prescribed.  Strict return to ED precautions discussed.  Patient verbalized understanding of this plan of care.  All questions and concerns addressed. [RZ]   1423 Patient is hemodynamically stable, vital signs are normal. Discharge instructions given. Return to ED precautions discussed. Follow up as directed. Pt verbalized understanding of this plan.  Pt is stable for discharge.  [RZ]      ED Course User Index  [RZ] Sarina Ulrich NP             CLINICAL IMPRESSION  1. Viral URI with cough    2. Hyperglycemia    3. Postnasal drip    4. Sore throat         ED Disposition Condition    Discharge Stable            Instruction:  I see no indication of an emergent process beyond that addressed during our encounter but have duly counseled the patient/family regarding the need for prompt follow-up as well as the  indications that should prompt immediate return to the emergency room should new or worrisome developments occur.  The patient/family has been provided with verbal and printed direction regarding our final diagnosis(es) as well as instructions regarding use of OTC and/or Rx medications intended to manage the patient's aforementioned conditions including:  ED Prescriptions       Medication Sig Dispense Start Date End Date Auth. Provider    fluticasone propionate (FLONASE) 50 mcg/actuation nasal spray 1 spray (50 mcg total) by Each Nostril route 2 (two) times daily as needed for Rhinitis. 15 g 10/18/2024 11/17/2024 Sarina Ulrich, FLACA    montelukast (SINGULAIR) 10 mg tablet Take 1 tablet (10 mg total) by mouth every evening. 30 tablet 10/18/2024 11/17/2024 Sarina Ulrich NP    benzonatate (TESSALON) 100 MG capsule Take 2 capsules (200 mg total) by mouth 3 (three) times daily as needed for Cough. 30 capsule 10/18/2024 10/28/2024 Sarina Ulrich, FLACA          Patient has been advised of following recommended follow-up instructions:  Follow-up Information       Follow up With Specialties Details Why Contact Info    PCP  Schedule an appointment as soon as possible for a visit  As needed           The patient/family communicates understanding of all this information and all remaining questions and concerns were addressed at this time.      The patient's condition did not warrant review of the  and prescription of controlled substances.      This note was created using dictation software.  This program may occasionally mistype words and phrases.         Sarina Ulrich NP  10/18/24 3376

## 2024-10-21 LAB
OHS QRS DURATION: 94 MS
OHS QTC CALCULATION: 393 MS

## 2024-10-30 ENCOUNTER — HOSPITAL ENCOUNTER (EMERGENCY)
Facility: OTHER | Age: 64
Discharge: HOME | End: 2024-10-30
Attending: EMERGENCY MEDICINE
Payer: MEDICARE

## 2024-10-30 VITALS
HEIGHT: 70 IN | RESPIRATION RATE: 17 BRPM | BODY MASS INDEX: 28.09 KG/M2 | DIASTOLIC BLOOD PRESSURE: 92 MMHG | OXYGEN SATURATION: 96 % | WEIGHT: 196.19 LBS | HEART RATE: 70 BPM | SYSTOLIC BLOOD PRESSURE: 158 MMHG | TEMPERATURE: 98 F

## 2024-10-30 DIAGNOSIS — R19.7 DIARRHEA, UNSPECIFIED TYPE: ICD-10-CM

## 2024-10-30 DIAGNOSIS — R68.89 CONGESTION OF THROAT: ICD-10-CM

## 2024-10-30 DIAGNOSIS — R06.02 SOB (SHORTNESS OF BREATH): Primary | ICD-10-CM

## 2024-10-30 LAB
ALBUMIN SERPL BCP-MCNC: 3.6 G/DL (ref 3.5–5.2)
ALP SERPL-CCNC: 81 U/L (ref 40–150)
ALT SERPL W/O P-5'-P-CCNC: 26 U/L (ref 10–44)
ANION GAP SERPL CALC-SCNC: 12 MMOL/L (ref 8–16)
AST SERPL-CCNC: 23 U/L (ref 10–40)
B-OH-BUTYR BLD STRIP-SCNC: 0.1 MMOL/L (ref 0–0.5)
BASOPHILS # BLD AUTO: 0.03 K/UL (ref 0–0.2)
BASOPHILS NFR BLD: 0.3 % (ref 0–1.9)
BILIRUB SERPL-MCNC: 0.2 MG/DL (ref 0.1–1)
BUN SERPL-MCNC: 32 MG/DL (ref 8–23)
CALCIUM SERPL-MCNC: 9.6 MG/DL (ref 8.7–10.5)
CHLORIDE SERPL-SCNC: 107 MMOL/L (ref 95–110)
CO2 SERPL-SCNC: 21 MMOL/L (ref 23–29)
CREAT SERPL-MCNC: 1.8 MG/DL (ref 0.5–1.4)
CTP QC/QA: YES
CTP QC/QA: YES
DIFFERENTIAL METHOD BLD: ABNORMAL
EOSINOPHIL # BLD AUTO: 0.1 K/UL (ref 0–0.5)
EOSINOPHIL NFR BLD: 0.8 % (ref 0–8)
ERYTHROCYTE [DISTWIDTH] IN BLOOD BY AUTOMATED COUNT: 12.9 % (ref 11.5–14.5)
EST. GFR  (NO RACE VARIABLE): 41 ML/MIN/1.73 M^2
GLUCOSE SERPL-MCNC: 224 MG/DL (ref 70–110)
HCT VFR BLD AUTO: 46.1 % (ref 40–54)
HCV AB SERPL QL IA: POSITIVE
HCV RNA SERPL QL NAA+PROBE: NOT DETECTED
HCV RNA SPEC NAA+PROBE-ACNC: NOT DETECTED IU/ML
HGB BLD-MCNC: 15.4 G/DL (ref 14–18)
HIV 1+2 AB+HIV1 P24 AG SERPL QL IA: NEGATIVE
IMM GRANULOCYTES # BLD AUTO: 0.05 K/UL (ref 0–0.04)
IMM GRANULOCYTES NFR BLD AUTO: 0.5 % (ref 0–0.5)
LYMPHOCYTES # BLD AUTO: 2.9 K/UL (ref 1–4.8)
LYMPHOCYTES NFR BLD: 25.9 % (ref 18–48)
MCH RBC QN AUTO: 28.9 PG (ref 27–31)
MCHC RBC AUTO-ENTMCNC: 33.4 G/DL (ref 32–36)
MCV RBC AUTO: 87 FL (ref 82–98)
MONOCYTES # BLD AUTO: 1 K/UL (ref 0.3–1)
MONOCYTES NFR BLD: 8.8 % (ref 4–15)
NEUTROPHILS # BLD AUTO: 7 K/UL (ref 1.8–7.7)
NEUTROPHILS NFR BLD: 63.7 % (ref 38–73)
NRBC BLD-RTO: 0 /100 WBC
OHS QRS DURATION: 78 MS
OHS QTC CALCULATION: 409 MS
PLATELET # BLD AUTO: 149 K/UL (ref 150–450)
PMV BLD AUTO: 12.4 FL (ref 9.2–12.9)
POC MOLECULAR INFLUENZA A AGN: NEGATIVE
POC MOLECULAR INFLUENZA B AGN: NEGATIVE
POCT GLUCOSE: 220 MG/DL (ref 70–110)
POTASSIUM SERPL-SCNC: 4.7 MMOL/L (ref 3.5–5.1)
PROT SERPL-MCNC: 6.7 G/DL (ref 6–8.4)
RBC # BLD AUTO: 5.32 M/UL (ref 4.6–6.2)
SARS-COV-2 RDRP RESP QL NAA+PROBE: NEGATIVE
SODIUM SERPL-SCNC: 140 MMOL/L (ref 136–145)
WBC # BLD AUTO: 11.02 K/UL (ref 3.9–12.7)

## 2024-10-30 PROCEDURE — 93005 ELECTROCARDIOGRAM TRACING: CPT | Mod: HCNC

## 2024-10-30 PROCEDURE — 87635 SARS-COV-2 COVID-19 AMP PRB: CPT | Mod: HCNC | Performed by: EMERGENCY MEDICINE

## 2024-10-30 PROCEDURE — 82962 GLUCOSE BLOOD TEST: CPT | Mod: HCNC

## 2024-10-30 PROCEDURE — 86803 HEPATITIS C AB TEST: CPT | Mod: HCNC | Performed by: EMERGENCY MEDICINE

## 2024-10-30 PROCEDURE — 87389 HIV-1 AG W/HIV-1&-2 AB AG IA: CPT | Mod: HCNC | Performed by: EMERGENCY MEDICINE

## 2024-10-30 PROCEDURE — 25000003 PHARM REV CODE 250: Mod: HCNC | Performed by: EMERGENCY MEDICINE

## 2024-10-30 PROCEDURE — 99285 EMERGENCY DEPT VISIT HI MDM: CPT | Mod: 25,HCNC

## 2024-10-30 PROCEDURE — 80053 COMPREHEN METABOLIC PANEL: CPT | Mod: HCNC | Performed by: EMERGENCY MEDICINE

## 2024-10-30 PROCEDURE — 82010 KETONE BODYS QUAN: CPT | Mod: HCNC | Performed by: EMERGENCY MEDICINE

## 2024-10-30 PROCEDURE — 93010 ELECTROCARDIOGRAM REPORT: CPT | Mod: HCNC,,, | Performed by: INTERNAL MEDICINE

## 2024-10-30 PROCEDURE — 85025 COMPLETE CBC W/AUTO DIFF WBC: CPT | Mod: HCNC | Performed by: EMERGENCY MEDICINE

## 2024-10-30 PROCEDURE — 87522 HEPATITIS C REVRS TRNSCRPJ: CPT | Mod: HCNC | Performed by: EMERGENCY MEDICINE

## 2024-10-30 PROCEDURE — 25500020 PHARM REV CODE 255: Mod: HCNC | Performed by: EMERGENCY MEDICINE

## 2024-10-30 RX ORDER — ONDANSETRON 4 MG/1
4 TABLET, ORALLY DISINTEGRATING ORAL EVERY 6 HOURS PRN
Qty: 10 TABLET | Refills: 0 | Status: SHIPPED | OUTPATIENT
Start: 2024-10-30 | End: 2024-11-06

## 2024-10-30 RX ORDER — LIDOCAINE HYDROCHLORIDE 20 MG/ML
15 SOLUTION OROPHARYNGEAL ONCE
Status: COMPLETED | OUTPATIENT
Start: 2024-10-30 | End: 2024-10-30

## 2024-10-30 RX ORDER — FAMOTIDINE 20 MG/1
20 TABLET, FILM COATED ORAL 2 TIMES DAILY
Qty: 20 TABLET | Refills: 0 | Status: SHIPPED | OUTPATIENT
Start: 2024-10-30 | End: 2024-11-09

## 2024-10-30 RX ADMIN — IOHEXOL 100 ML: 350 INJECTION, SOLUTION INTRAVENOUS at 04:10

## 2024-10-30 RX ADMIN — LIDOCAINE HYDROCHLORIDE 15 ML: 20 SOLUTION ORAL at 03:10

## 2024-10-30 NOTE — ED PROVIDER NOTES
"     Source of History:  The patient    Chief complaint:  Shortness of Breath (Seen here 10/18 and "feels worse." Reports SOB, runny nose, headache, nausea, and diarrhea.)      HPI:  Emery Braragan is a 63 y.o. male  history of diabetes and hypertension presents with complaint of shortness of breath as well as congestion has been going on for the last 2 weeks.  Was seen here 12 days ago with similar symptoms.  Had blood work including a chest x-ray which was unremarkable.  Found to be hyperglycemic but not in DKA.  Diagnosed with viral URI with cough.  Discharged with fluticasone nasal spray as well as Singulair and Tessalon Perles.    States he has not had any improvement.  Still feels very congested.  Now with associated diarrhea over the last 1 day with some right lower quadrant tenderness.  Denies any fevers.  States he has had difficulty eating because he has a sore throat.  Denies any fevers.    This is the extent to the patients complaints today here in the emergency department.    ROS:   See HPI.    Review of patient's allergies indicates:  No Known Allergies    PMH:  As per HPI and below:  Past Medical History:   Diagnosis Date    Chronic back pain     Diabetes mellitus     Gallstones     Glaucoma (increased eye pressure)     Hepatitis C     Hypertension      Past Surgical History:   Procedure Laterality Date    CHOLECYSTECTOMY      COLONOSCOPY N/A 4/27/2016    Procedure: COLONOSCOPY;  Surgeon: Andre Sanchez MD;  Location: [facility];  Service: Endoscopy;  Laterality: N/A;    ESOPHAGOGASTRODUODENOSCOPY N/A 8/9/2019    Procedure: ESOPHAGOGASTRODUODENOSCOPY (EGD);  Surgeon: Mike Garcia MD;  Location: [facility];  Service: Endoscopy;  Laterality: N/A;    ESOPHAGOGASTRODUODENOSCOPY N/A 5/18/2022    Procedure: EGD (ESOPHAGOGASTRODUODENOSCOPY);  Surgeon: Darrian Breen MD;  Location: [facility];  Service: Gastroenterology;  Laterality: N/A;       Social History     Tobacco Use    Smoking status: Every " "Day     Current packs/day: 1.00     Types: Cigarettes    Smokeless tobacco: Never   Substance Use Topics    Alcohol use: No    Drug use: No       Physical Exam:    BP (!) 167/104   Pulse 62   Temp 97.9 °F (36.6 °C) (Oral)   Resp 17   Ht 5' 10" (1.778 m)   Wt 89 kg (196 lb 3.4 oz)   SpO2 96%   BMI 28.15 kg/m²   Nursing note and vital signs reviewed.  Constitutional: No acute distress.  Nontoxic  Eyes:  Conjunctiva normal.  Extraocular muscles are intact.  Cardiovascular: Regular rate and rhythm.  No murmurs. No gallops. No rubs  Respiratory: Clear to auscultation bilaterally.  Good air movement.  No wheezes.  No rhonchi. No rales. No accessory muscle use..  Abdomen: Soft.  Not distended.  Tenderness to palpation in the right lower quadrant with mild voluntary guarding.  No rebound.  Non peritoneal.  Neuro: alert. At baseline.  No focal neurological deficits.  MSK: No deformities.      Summary of Previous Medical Records:  Reviewed emergency department visit on October 18, 2024 in which she was diagnosed with a viral URI.  Blood work was unremarkable other than hyperglycemia.  No DKA.  Chest x-ray reviewed which was normal.      Differential Dx/ MDM/ Workup:  63-year-old male symptoms consistent with a viral URI likely influenza.  Now with associated diarrhea and right lower quadrant tenderness.  Still possible viral illness but with the right lower quadrant tenderness and tenderness at McBurney's point I feel getting imaging to evaluate for acute appendicitis is indicated.  Will get labs to evaluate for acute kidney injury, metabolic derangement, DKA and observe.      ED Course as of 10/30/24 0552   Wed Oct 30, 2024   0327 POCT Glucose(!): 220 [SM]   0409 POC Molecular Influenza A Ag: Negative [SM]   0409 POC Molecular Influenza B Ag: Negative [SM]   0409 SARS-CoV-2 RNA, Amplification, Qual: Negative [SM]   0409 WBC: 11.02 [SM]   0409 Hemoglobin: 15.4 [SM]   0409 Platelet Count(!): 149 [SM]   0431 Sodium: 140 " []   0431 Potassium: 4.7 [SM]   0431 Creatinine(!): 1.8  Baseline creatinine 1.6.  Slightly worse than normal which could be an acute on chronic or progression of chronic kidney disease. [SM]   0455 Beta-Hydroxybutyrate: 0.1  Glucose 220. CO2 21 and AG 12. Not DKA [SM]   0455 Anion Gap: 12 [SM]   0544 CT Abdomen Pelvis With IV Contrast NO Oral Contrast  No appendicitis. No acute process. []   0551 Hepatitis C Ab(!): Positive  Patient with known history of Hep C [SM]   0552 No further workup is indicated in the emergency department today.  I updated pt regarding results and I counseled pt regarding supportive care measures.  Diagnosis and treatment plan explained to patient. I have answered all questions and the patient is satisfied with the plan of care. Patient discharged home in stable condition.   []      ED Course User Index  [] Thaddeus Barney,                  Diagnostic Impression:    1. SOB (shortness of breath)    2. Congestion of throat    3. Diarrhea, unspecified type         ED Disposition Condition    Discharge Stable            ED Prescriptions       Medication Sig Dispense Start Date End Date Auth. Provider    ondansetron (ZOFRAN-ODT) 4 MG TbDL Take 1 tablet (4 mg total) by mouth every 6 (six) hours as needed (nausea vomiting). 10 tablet 10/30/2024 11/6/2024 Thaddeus Barney,     famotidine (PEPCID) 20 MG tablet Take 1 tablet (20 mg total) by mouth 2 (two) times daily. for 10 days 20 tablet 10/30/2024 11/9/2024 Thaddeus Barney DO          Follow-up Information    None          Thaddeus Barney,   10/30/24 0552

## 2024-10-30 NOTE — DISCHARGE INSTRUCTIONS
Follow up with your doctor in 1 week. If symptoms significantly worsen - return to the ED for further evaluation.    Use maalox over the counter. 30 ml by mouth, approximately 30 minutes prior to meals.This should help your throat a bit.

## 2024-12-31 ENCOUNTER — HOSPITAL ENCOUNTER (EMERGENCY)
Facility: OTHER | Age: 64
Discharge: HOME | End: 2024-12-31
Attending: EMERGENCY MEDICINE
Payer: MEDICARE

## 2024-12-31 VITALS
WEIGHT: 177 LBS | HEIGHT: 69 IN | DIASTOLIC BLOOD PRESSURE: 77 MMHG | SYSTOLIC BLOOD PRESSURE: 109 MMHG | TEMPERATURE: 98 F | RESPIRATION RATE: 18 BRPM | OXYGEN SATURATION: 96 % | BODY MASS INDEX: 26.22 KG/M2 | HEART RATE: 78 BPM

## 2024-12-31 DIAGNOSIS — B37.0 ORAL PHARYNGEAL CANDIDIASIS: Primary | ICD-10-CM

## 2024-12-31 LAB
GROUP A STREP, MOLECULAR: NEGATIVE
KOH PREP SPEC: NORMAL

## 2024-12-31 PROCEDURE — 99283 EMERGENCY DEPT VISIT LOW MDM: CPT | Mod: HCNC

## 2024-12-31 PROCEDURE — 87651 STREP A DNA AMP PROBE: CPT | Mod: HCNC | Performed by: EMERGENCY MEDICINE

## 2024-12-31 PROCEDURE — 87070 CULTURE OTHR SPECIMN AEROBIC: CPT | Mod: HCNC

## 2024-12-31 PROCEDURE — 87210 SMEAR WET MOUNT SALINE/INK: CPT | Mod: HCNC | Performed by: EMERGENCY MEDICINE

## 2024-12-31 RX ORDER — FLUCONAZOLE 200 MG/1
200 TABLET ORAL DAILY
Qty: 14 TABLET | Refills: 0 | Status: SHIPPED | OUTPATIENT
Start: 2024-12-31 | End: 2025-01-14

## 2024-12-31 NOTE — DISCHARGE INSTRUCTIONS
Thank you for letting me care for you today - it was nice to meet you and I hope you feel better soon. I have placed a referral to [facility] for follow up care. You can call [phone] ([phone]) to schedule. You can also schedule on the [facility] My[facility] korin.     I sent in the following medication to your pharmacy:   Diflucan: 1 tablet once a day for 14 days. Sometimes you need medication for longer than 14 days, so if your throat has not improved after 14 days, please make sure to follow up with either [facility] or your Primary Care Provider.   Please also follow up with your Endocrinologist, your throat is a sign that your diabetes is not well controlled.   Continue Lantus 50 units daily  Novolog units for sliding scale: 5 units of Novolog at every meal. Check blood glucose before meal and add more units of Novolog if needed. Start checking blood glucose three times daily before meals and use pre-meal blood glucose to determine extra   Pre-meal blood glucose                                  Extra Novolog units                             Total Novolog dose  <180                                                                0 units                                                  5 units   181-210                                                           1 unit                                                   6 units  211-240                                                           2 units                                                  7 units   241-270                                                           3 units                                                  8 units   271-300                                                           4 units                                                  9 units   301-330                                                           5 units                                                  10 units   331-360                                                            6 units                                                  11 units   361-390                                                           7 units                                                  12 units   >390                                                                8 units                                                  13 units     Call endocrinology about getting a Dexcom Sensor.     Our goal at [facility] is to always give you outstanding care and exceptional service. You may receive a survey by mail or email in the next week about your experience in our ED. We would greatly appreciate you completing and returning the survey. Your feedback provides us with a way to recognize our staff who give very good care and it helps us learn how to improve when your experience was below our aspiration of excellence.     All the best,     Florence George, MPH, PA-C  Emergency Department Physician Assistant  [facility], [facility]

## 2025-01-02 LAB — BACTERIA THROAT CULT: NORMAL

## 2025-01-02 NOTE — ED PROVIDER NOTES
Encounter Date: 12/31/2024       History     Chief Complaint   Patient presents with    Sore Throat     Sore throat since last night. Large white patchy exudate on tonsils. Denies fever.      Patient is a 64 y.o. male who presents with sore throat that began last night. Patient notice white patches in the back of his throat this morning. Denies difficulty swallowing or tolerating secretions. No voice changes.  Patient is a diabetic, reports compliance with his diabetes medicines. Patient does not reports close contacts with URI symptoms. Patient has taken no medication for symptom relief.  Denies fever, chills, swollen lymph nodes, headache, chest pain, shortness of breath, wheezing, stridor, drooling, dental problems, NVD, abdominal pain, constipation, urinary problems, joint problems, rashes, or any other complaints at this time.      The history is provided by the patient.     Review of patient's allergies indicates:  No Known Allergies  Past Medical History:   Diagnosis Date    Chronic back pain     Diabetes mellitus     Gallstones     Glaucoma (increased eye pressure)     Hepatitis C     Hypertension      Past Surgical History:   Procedure Laterality Date    CHOLECYSTECTOMY      COLONOSCOPY N/A 4/27/2016    Procedure: COLONOSCOPY;  Surgeon: Andre Sanchez MD;  Location: [facility];  Service: Endoscopy;  Laterality: N/A;    ESOPHAGOGASTRODUODENOSCOPY N/A 8/9/2019    Procedure: ESOPHAGOGASTRODUODENOSCOPY (EGD);  Surgeon: Mike Garcia MD;  Location: [facility];  Service: Endoscopy;  Laterality: N/A;    ESOPHAGOGASTRODUODENOSCOPY N/A 5/18/2022    Procedure: EGD (ESOPHAGOGASTRODUODENOSCOPY);  Surgeon: Darrian Breen MD;  Location: [facility];  Service: Gastroenterology;  Laterality: N/A;     Family History   Problem Relation Name Age of Onset    Diabetes Mother      Kidney disease Mother      Heart disease Mother       Social History     Tobacco Use    Smoking status: Every Day     Current  packs/day: 1.00     Types: Cigarettes    Smokeless tobacco: Never   Substance Use Topics    Alcohol use: No    Drug use: No     Review of Systems   Constitutional:  Negative for chills and fever.   HENT:  Positive for sore throat. Negative for congestion, ear discharge, ear pain, mouth sores, postnasal drip, rhinorrhea, sinus pressure, sneezing, trouble swallowing and voice change.    Gastrointestinal:  Negative for abdominal pain, nausea and vomiting.   Endocrine: Negative.        Physical Exam     Initial Vitals [12/31/24 0851]   BP Pulse Resp Temp SpO2   104/75 86 18 98.6 °F (37 °C) 95 %      MAP       --         Physical Exam    ED Course   Procedures  Labs Reviewed   GROUP A STREP, MOLECULAR       Result Value    Group A Strep, Molecular Negative     CULTURE, RESPIRATORY  - THROAT    RESPIRATORY CULTURE - THROAT Normal respiratory len     KOH PREP    KOH Prep No yeast or fungal elements seen      Narrative:     Oropharnyx          Imaging Results    None          Medications - No data to display  Medical Decision Making  Amount and/or Complexity of Data Reviewed  Labs:  Decision-making details documented in ED Course.    Risk  Prescription drug management.               ED Course as of 01/02/25 0901   Tue Dec 31, 2024   0926 Group A Strep, Molecular: Negative [OB]      ED Course User Index  [OB] Florence George PA-C                           Clinical Impression:  Final diagnoses:  [B37.0] Oral pharyngeal candidiasis (Primary)          ED Disposition Condition    Discharge Stable          ED Prescriptions       Medication Sig Dispense Start Date End Date Auth. Provider    fluconazole (DIFLUCAN) 200 MG Tab Take 1 tablet (200 mg total) by mouth once daily. for 14 days 14 tablet 12/31/2024 1/14/2025 Florence George PA-C          Follow-up Information    None

## 2025-01-02 NOTE — ED PROVIDER NOTES
Encounter Date: 12/31/2024       History     Chief Complaint   Patient presents with    Sore Throat     Sore throat since last night. Large white patchy exudate on tonsils. Denies fever.      Patient is a 64 y.o. male who presents with sore throat that began last night.  Patient noticed white patches in the back of his throat this morning.  Patient reports pain with swallowing, however, he is able to tolerate secretions.  No voice changes.  Patient is a diabetic, reports compliance with his medication.  Patient has taken no medication for symptom relief.   Denies fever, headache, chest pain, shortness of breath, wheezing, stridor, drooling, NVD, abdominal pain, constipation, urinary problems, joint problems, rashes, or any other complaints at this time.      The history is provided by the patient.     Review of patient's allergies indicates:  No Known Allergies  Past Medical History:   Diagnosis Date    Chronic back pain     Diabetes mellitus     Gallstones     Glaucoma (increased eye pressure)     Hepatitis C     Hypertension      Past Surgical History:   Procedure Laterality Date    CHOLECYSTECTOMY      COLONOSCOPY N/A 4/27/2016    Procedure: COLONOSCOPY;  Surgeon: Andre Sanchez MD;  Location: [facility];  Service: Endoscopy;  Laterality: N/A;    ESOPHAGOGASTRODUODENOSCOPY N/A 8/9/2019    Procedure: ESOPHAGOGASTRODUODENOSCOPY (EGD);  Surgeon: Mike Garcia MD;  Location: [facility];  Service: Endoscopy;  Laterality: N/A;    ESOPHAGOGASTRODUODENOSCOPY N/A 5/18/2022    Procedure: EGD (ESOPHAGOGASTRODUODENOSCOPY);  Surgeon: Darrian Breen MD;  Location: [facility];  Service: Gastroenterology;  Laterality: N/A;     Family History   Problem Relation Name Age of Onset    Diabetes Mother      Kidney disease Mother      Heart disease Mother       Social History     Tobacco Use    Smoking status: Every Day     Current packs/day: 1.00     Types: Cigarettes    Smokeless tobacco: Never   Substance Use Topics     Alcohol use: No    Drug use: No     Review of Systems   Constitutional:  Negative for chills and fever.   HENT:  Positive for sore throat. Negative for congestion, mouth sores, rhinorrhea, trouble swallowing and voice change.    All other systems reviewed and are negative.      Physical Exam     Initial Vitals [12/31/24 0851]   BP Pulse Resp Temp SpO2   104/75 86 18 98.6 °F (37 °C) 95 %      MAP       --         Physical Exam    Vitals reviewed.  Constitutional: He appears well-developed and well-nourished.   HENT:   Head: Normocephalic and atraumatic.   Nose: Rhinorrhea present. Mouth/Throat: Uvula is midline. No oral lesions. No trismus in the jaw. No uvula swelling. Oropharyngeal exudate present. No posterior oropharyngeal edema, posterior oropharyngeal erythema or tonsillar abscesses.   2+ tonsils, bilaterally, with large white patches of exudate.  Tonsils symmetrical. No apparent PTA. Uvula midline.  There is not cervical adenopathy. No Juan's angina.     Eyes: EOM are normal.   Neck:   Normal range of motion.  Cardiovascular:  Normal rate.           Pulmonary/Chest: No stridor.   Musculoskeletal:      Cervical back: Normal range of motion.     Lymphadenopathy:     He has no cervical adenopathy.   Neurological: He is alert and oriented to person, place, and time.         ED Course   Procedures  Labs Reviewed   GROUP A STREP, MOLECULAR       Result Value    Group A Strep, Molecular Negative     CULTURE, RESPIRATORY  - THROAT    RESPIRATORY CULTURE - THROAT Normal respiratory len     KOH PREP    KOH Prep No yeast or fungal elements seen      Narrative:     Oropharnyx          Imaging Results    None          Medications - No data to display  Medical Decision Making  Patient is well-appearing, afebrile 64 y.o. male. VSS and do not suggest sepsis. Denies chest pain, SOB, wheezing, difficulty swallowing, neck pain, neck stiffness. There is no neck pain or limited ROM to suggest retropharyngeal abscess or  meningitis. Tolerating PO, non-toxic appearing, no hypoxia. The patient remained comfortable and stable during their visit in the ED.  Details of ED course documented in ED workup.     Differential Diagnosis includes, but is not limited to: COVID, influenza, viral URI, bacterial/viral pharyngitis, candidiasis, esophagitis, oropharyngeal cancer, mono, diptheria    All historical, clinical, and laboratory findings reviewed. COVID test negative. Influenza test negative. Strep test negative. KOH prep pending. Patient with constellation of symptoms suggestive of possible pharyngeal candidiasis, as patient is a diabetic.  We will empirically treat with fluconazole.  There are no concerning features on physical exam to suggest an emergent or life threatening condition or an invasive bacterial infection, including, but not limited to: peritonsillar abscess, retropharyngeal abscess, Juan's, epiglottitis. No further intervention is indicated at this time. The patient is at low risk for an emergent/life threatening medical condition at this time, and I am of the belief that that it is safe to discharge the patient from the emergency department.     Patient instructed to follow up with PCP in 2-3 days for recheck of today's complaints.  I also placed a referral to ENT.  Patient has been counseled regarding the need for follow-up as well as the indications to return to the emergency room should new or worrisome developments. Discharge and follow-up instructions discussed with the patient who expressed understanding and willingness to comply with recommendations. Patient discharged from the emergency department in stable condition, in no acute distress.     Amount and/or Complexity of Data Reviewed  Labs:  Decision-making details documented in ED Course.    Risk  Prescription drug management.               ED Course as of 01/02/25 1119   Tue Dec 31, 2024   0926 Group A Strep, Molecular: Negative [OB]      ED Course User  Index  [OB] Florence George PA-C                           Clinical Impression:  Final diagnoses:  [B37.0] Oral pharyngeal candidiasis (Primary)          ED Disposition Condition    Discharge Stable          ED Prescriptions       Medication Sig Dispense Start Date End Date Auth. Provider    fluconazole (DIFLUCAN) 200 MG Tab Take 1 tablet (200 mg total) by mouth once daily. for 14 days 14 tablet 12/31/2024 1/14/2025 Florence George PA-C          Follow-up Information    None          Florence George PA-C  01/02/25 1118

## 2025-01-02 NOTE — ED PROVIDER NOTES
Encounter Date: 12/31/2024       History     Chief Complaint   Patient presents with    Sore Throat     Sore throat since last night. Large white patchy exudate on tonsils. Denies fever.      Patient is a 64 y.o. male who presents with sore throat that began last night. Patient noticed large white patches on his throat. States that it is painful to swallow but that he is able to tolerate secretions. No voice changes. Patient is a diabetic. Reports compliance with his diabetes medication. Patient does not reports close contacts with URI symptoms. Patient has taken no medication for symptom relief.  Denies fever, headache, chest pain, shortness of breath, wheezing, stridor, drooling, NVD, abdominal pain, constipation, urinary problems, joint problems, rashes, or any other complaints at this time.      The history is provided by the patient.     Review of patient's allergies indicates:  No Known Allergies  Past Medical History:   Diagnosis Date    Chronic back pain     Diabetes mellitus     Gallstones     Glaucoma (increased eye pressure)     Hepatitis C     Hypertension      Past Surgical History:   Procedure Laterality Date    CHOLECYSTECTOMY      COLONOSCOPY N/A 4/27/2016    Procedure: COLONOSCOPY;  Surgeon: Andre Sanchez MD;  Location: [facility];  Service: Endoscopy;  Laterality: N/A;    ESOPHAGOGASTRODUODENOSCOPY N/A 8/9/2019    Procedure: ESOPHAGOGASTRODUODENOSCOPY (EGD);  Surgeon: Mike Garcia MD;  Location: [facility];  Service: Endoscopy;  Laterality: N/A;    ESOPHAGOGASTRODUODENOSCOPY N/A 5/18/2022    Procedure: EGD (ESOPHAGOGASTRODUODENOSCOPY);  Surgeon: Darrian Breen MD;  Location: [facility];  Service: Gastroenterology;  Laterality: N/A;     Family History   Problem Relation Name Age of Onset    Diabetes Mother      Kidney disease Mother      Heart disease Mother       Social History     Tobacco Use    Smoking status: Every Day     Current packs/day: 1.00     Types: Cigarettes     Smokeless tobacco: Never   Substance Use Topics    Alcohol use: No    Drug use: No     Review of Systems   Constitutional:  Negative for chills and fever.   HENT:  Positive for sore throat. Negative for congestion, drooling, facial swelling, nosebleeds, postnasal drip, rhinorrhea, trouble swallowing and voice change.    Respiratory:  Negative for shortness of breath, wheezing and stridor.    Cardiovascular:  Negative for chest pain.   Gastrointestinal:  Negative for abdominal distention, abdominal pain, nausea and vomiting.   Endocrine: Negative.        Physical Exam     Initial Vitals [12/31/24 0851]   BP Pulse Resp Temp SpO2   104/75 86 18 98.6 °F (37 °C) 95 %      MAP       --         Physical Exam    Constitutional: He appears well-developed and well-nourished.   HENT:   Head: Normocephalic and atraumatic. Mouth/Throat: Uvula is midline. No trismus in the jaw. No dental abscesses or uvula swelling. Posterior oropharyngeal erythema present. No oropharyngeal exudate, posterior oropharyngeal edema or tonsillar abscesses.   Large patches of white exudate on pharynx white    Neck: No thyromegaly present. No tracheal deviation present.   Normal range of motion.  Cardiovascular:  Normal rate.           Pulmonary/Chest: Breath sounds normal. No stridor. No respiratory distress. He has no wheezes. He has no rhonchi. He has no rales.   Abdominal: He exhibits no distension. There is no abdominal tenderness.   Musculoskeletal:      Cervical back: Normal range of motion.     Lymphadenopathy:     He has no cervical adenopathy.   Neurological: He is alert.         ED Course   Procedures  Labs Reviewed   GROUP A STREP, MOLECULAR       Result Value    Group A Strep, Molecular Negative     CULTURE, RESPIRATORY  - THROAT    RESPIRATORY CULTURE - THROAT Normal respiratory len     KOH PREP    KOH Prep No yeast or fungal elements seen      Narrative:     Oropharnyx          Imaging Results    None          Medications - No  data to display  Medical Decision Making  Amount and/or Complexity of Data Reviewed  Labs:  Decision-making details documented in ED Course.    Risk  Prescription drug management.               ED Course as of 01/02/25 0821   Tue Dec 31, 2024   0926 Group A Strep, Molecular: Negative [OB]      ED Course User Index  [OB] Florence George PA-C                           Clinical Impression:  Final diagnoses:  [B37.0] Oral pharyngeal candidiasis (Primary)          ED Disposition Condition    Discharge Stable          ED Prescriptions       Medication Sig Dispense Start Date End Date Auth. Provider    fluconazole (DIFLUCAN) 200 MG Tab Take 1 tablet (200 mg total) by mouth once daily. for 14 days 14 tablet 12/31/2024 1/14/2025 Florence George PA-C          Follow-up Information    None